# Patient Record
Sex: MALE | Race: WHITE | Employment: OTHER | URBAN - METROPOLITAN AREA
[De-identification: names, ages, dates, MRNs, and addresses within clinical notes are randomized per-mention and may not be internally consistent; named-entity substitution may affect disease eponyms.]

---

## 2017-01-12 DIAGNOSIS — I12.9 BENIGN HYPERTENSION WITH CHRONIC KIDNEY DISEASE, STAGE III (H): ICD-10-CM

## 2017-01-12 DIAGNOSIS — N18.30 BENIGN HYPERTENSION WITH CHRONIC KIDNEY DISEASE, STAGE III (H): ICD-10-CM

## 2017-01-12 NOTE — TELEPHONE ENCOUNTER
amLODIPine (NORVASC) 10 MG tablet      Last Written Prescription Date: 10/17/16  Last Fill Quantity: 90, # refills: 0    Last Office Visit with FMKEISHA, RADHAMES or [facility] prescribing provider:  10/13/16   Future Office Visit:    Next 5 appointments (look out 90 days)     Feb 14, 2017  2:30 PM   Return Visit with Catia Loredo MD   [facility]    [address] [place] [id number] [phone]                    BP Readings from Last 3 Encounters:   10/17/16 142/65   05/03/16 142/66   04/12/16 135/70           Micky Faarax  [facility]

## 2017-01-13 NOTE — TELEPHONE ENCOUNTER
Routing refill request to provider for review/approval because:  Last 2 BP readings were above goal.    Mally Russo RN

## 2017-01-14 RX ORDER — AMLODIPINE BESYLATE 10 MG/1
TABLET ORAL
Qty: 90 TABLET | Refills: 0 | Status: SHIPPED | OUTPATIENT
Start: 2017-01-14 | End: 2017-01-27

## 2017-01-27 ENCOUNTER — OFFICE VISIT (OUTPATIENT)
Dept: FAMILY MEDICINE | Facility: CLINIC | Age: 82
End: 2017-01-27
Payer: COMMERCIAL

## 2017-01-27 VITALS
SYSTOLIC BLOOD PRESSURE: 122 MMHG | OXYGEN SATURATION: 100 % | HEIGHT: 69 IN | TEMPERATURE: 98.8 F | WEIGHT: 215.4 LBS | BODY MASS INDEX: 31.9 KG/M2 | HEART RATE: 59 BPM | DIASTOLIC BLOOD PRESSURE: 64 MMHG

## 2017-01-27 DIAGNOSIS — K21.9 GASTROESOPHAGEAL REFLUX DISEASE WITHOUT ESOPHAGITIS: ICD-10-CM

## 2017-01-27 DIAGNOSIS — E78.5 HYPERLIPIDEMIA LDL GOAL <130: ICD-10-CM

## 2017-01-27 DIAGNOSIS — I12.9 BENIGN HYPERTENSION WITH CHRONIC KIDNEY DISEASE, STAGE III (H): ICD-10-CM

## 2017-01-27 DIAGNOSIS — J30.89 PERENNIAL ALLERGIC RHINITIS, UNSPECIFIED ALLERGIC RHINITIS TRIGGER: ICD-10-CM

## 2017-01-27 DIAGNOSIS — Z00.00 ENCOUNTER FOR ROUTINE ADULT HEALTH EXAMINATION WITHOUT ABNORMAL FINDINGS: Primary | ICD-10-CM

## 2017-01-27 DIAGNOSIS — E03.4 HYPOTHYROIDISM DUE TO ACQUIRED ATROPHY OF THYROID: ICD-10-CM

## 2017-01-27 DIAGNOSIS — N18.30 BENIGN HYPERTENSION WITH CHRONIC KIDNEY DISEASE, STAGE III (H): ICD-10-CM

## 2017-01-27 DIAGNOSIS — C61 PROSTATE CANCER (H): ICD-10-CM

## 2017-01-27 LAB
ALT SERPL W P-5'-P-CCNC: 20 U/L (ref 0–70)
ANION GAP SERPL CALCULATED.3IONS-SCNC: 8 MMOL/L (ref 3–14)
BUN SERPL-MCNC: 22 MG/DL (ref 7–30)
CALCIUM SERPL-MCNC: 9.4 MG/DL (ref 8.5–10.1)
CHLORIDE SERPL-SCNC: 107 MMOL/L (ref 94–109)
CHOLEST SERPL-MCNC: 156 MG/DL
CO2 SERPL-SCNC: 26 MMOL/L (ref 20–32)
CREAT SERPL-MCNC: 1.44 MG/DL (ref 0.66–1.25)
GFR SERPL CREATININE-BSD FRML MDRD: 46 ML/MIN/1.7M2
GLUCOSE SERPL-MCNC: 106 MG/DL (ref 70–99)
HDLC SERPL-MCNC: 46 MG/DL
LDLC SERPL CALC-MCNC: 80 MG/DL
NONHDLC SERPL-MCNC: 110 MG/DL
POTASSIUM SERPL-SCNC: 4.3 MMOL/L (ref 3.4–5.3)
PSA SERPL-MCNC: 0.17 UG/L (ref 0–4)
SODIUM SERPL-SCNC: 141 MMOL/L (ref 133–144)
T4 FREE SERPL-MCNC: 1.18 NG/DL (ref 0.76–1.46)
TRIGL SERPL-MCNC: 151 MG/DL
TSH SERPL DL<=0.005 MIU/L-ACNC: 4.34 MU/L (ref 0.4–4)
URATE SERPL-MCNC: 6.6 MG/DL (ref 3.5–7.2)

## 2017-01-27 PROCEDURE — 84460 ALANINE AMINO (ALT) (SGPT): CPT | Performed by: FAMILY MEDICINE

## 2017-01-27 PROCEDURE — 84443 ASSAY THYROID STIM HORMONE: CPT | Performed by: FAMILY MEDICINE

## 2017-01-27 PROCEDURE — 80048 BASIC METABOLIC PNL TOTAL CA: CPT | Performed by: FAMILY MEDICINE

## 2017-01-27 PROCEDURE — 84550 ASSAY OF BLOOD/URIC ACID: CPT | Performed by: FAMILY MEDICINE

## 2017-01-27 PROCEDURE — 36415 COLL VENOUS BLD VENIPUNCTURE: CPT | Performed by: FAMILY MEDICINE

## 2017-01-27 PROCEDURE — 84153 ASSAY OF PSA TOTAL: CPT | Performed by: FAMILY MEDICINE

## 2017-01-27 PROCEDURE — 80061 LIPID PANEL: CPT | Performed by: FAMILY MEDICINE

## 2017-01-27 PROCEDURE — G0439 PPPS, SUBSEQ VISIT: HCPCS | Performed by: FAMILY MEDICINE

## 2017-01-27 PROCEDURE — 84439 ASSAY OF FREE THYROXINE: CPT | Performed by: FAMILY MEDICINE

## 2017-01-27 RX ORDER — FLUTICASONE PROPIONATE 50 MCG
SPRAY, SUSPENSION (ML) NASAL
Qty: 1 BOTTLE | Refills: 1 | Status: SHIPPED | OUTPATIENT
Start: 2017-01-27 | End: 2018-02-21

## 2017-01-27 RX ORDER — LEVOTHYROXINE SODIUM 88 UG/1
TABLET ORAL
Qty: 90 TABLET | Refills: 1 | Status: CANCELLED | OUTPATIENT
Start: 2017-01-27

## 2017-01-27 RX ORDER — LEVOTHYROXINE SODIUM 88 UG/1
88 TABLET ORAL DAILY
Qty: 90 TABLET | Refills: 1 | Status: SHIPPED | OUTPATIENT
Start: 2017-01-27 | End: 2017-09-01

## 2017-01-27 RX ORDER — AMLODIPINE BESYLATE 10 MG/1
10 TABLET ORAL DAILY
Qty: 90 TABLET | Refills: 1 | Status: SHIPPED | OUTPATIENT
Start: 2017-01-27 | End: 2017-02-16

## 2017-01-27 RX ORDER — LISINOPRIL 40 MG/1
40 TABLET ORAL DAILY
Qty: 90 TABLET | Refills: 1 | Status: SHIPPED | OUTPATIENT
Start: 2017-01-27 | End: 2017-09-01

## 2017-01-27 ASSESSMENT — PAIN SCALES - GENERAL: PAINLEVEL: NO PAIN (0)

## 2017-01-27 NOTE — MR AVS SNAPSHOT
After Visit Summary   1/27/2017    James Lama    MRN: [id number]           Patient Information     Date Of Birth          6/28/1927        Visit Information        Provider Department      1/27/2017 10:00 AM Juan Carlos Jansen MD [facility]        Today's Diagnoses     Encounter for routine adult health examination without abnormal findings    -  1     Prostate cancer (H)         Benign hypertension with chronic kidney disease, stage III         Hyperlipidemia LDL goal <130         Hypothyroidism due to acquired atrophy of thyroid         Gastroesophageal reflux disease without esophagitis         Perennial allergic rhinitis, unspecified allergic rhinitis trigger           Care Instructions      Preventive Health Recommendations:       Male Ages 65 and over    Yearly exam:             See your health care provider every year in order to  o   Review health changes.   o   Discuss preventive care.    o   Review your medicines if your doctor has prescribed any.    Talk with your health care provider about whether you should have a test to screen for prostate cancer (PSA).    Every 3 years, have a diabetes test (fasting glucose). If you are at risk for diabetes, you should have this test more often.    Every 5 years, have a cholesterol test. Have this test more often if you are at risk for high cholesterol or heart disease.     Every 10 years, have a colonoscopy. Or, have a yearly FIT test (stool test). These exams will check for colon cancer.    Talk to with your health care provider about screening for Abdominal Aortic Aneurysm if you have a family history of AAA or have a history of smoking.  Shots:     Get a flu shot each year.     Get a tetanus shot every 10 years.     Talk to your doctor about your pneumonia vaccines. There are now two you should receive - Pneumovax (PPSV 23) and Prevnar (PCV 13).    Talk to your doctor about a shingles vaccine.     Talk to your doctor about  the hepatitis B vaccine.  Nutrition:     Eat at least 5 servings of fruits and vegetables each day.     Eat whole-grain bread, whole-wheat pasta and brown rice instead of white grains and rice.     Talk to your doctor about Calcium and Vitamin D.   Lifestyle    Exercise for at least 150 minutes a week (30 minutes a day, 5 days a week). This will help you control your weight and prevent disease.     Limit alcohol to one drink per day.     No smoking.     Wear sunscreen to prevent skin cancer.     See your dentist every six months for an exam and cleaning.     See your eye doctor every 1 to 2 years to screen for conditions such as glaucoma, macular degeneration and cataracts.        Follow-ups after your visit        Follow-up notes from your care team     Return in about 6 months (around 7/14/2017) for blood pressure check, lab tests, recheck medications.      Your next 10 appointments already scheduled     Feb 14, 2017  2:30 PM   [facility]    [address] [place] [id number] [phone]              Who to contact     If you have questions or need follow up information about today's clinic visit or your schedule please contact [facility] directly at [phone].  Normal or non-critical lab and imaging results will be communicated to you by [employer], letter or phone within 4 business days after the clinic has received the results. If you do not hear from us within 7 days, please contact the clinic through [employer] or phone. If you have a critical or abnormal lab result, we will notify you by phone as soon as possible.  Submit refill requests through [employer]point or call your pharmacy and they will forward the refill request to us. Please allow 3 business days for your refill to be completed.          Additional Information About Your Visit        auctionpoint Information     auctionpoint lets you send messages  "to your doctor, view your test results, renew your prescriptions, schedule appointments and more. To sign up, go to [email or web address] . Click on \"Log in\" on the left side of the screen, which will take you to the Welcome page. Then click on \"Sign up Now\" on the right side of the page.     You will be asked to enter the access code listed below, as well as some personal information. Please follow the directions to create your username and password.     Your access code is: [email or web address]  Expires: 2017 10:40 AM     Your access code will  in 90 days. If you need help or a new code, please call your [facility] clinic or [phone].        Care EveryWhere ID     This is your [facility] EveryWhere ID. This could be used by other organizations to access your [facility] medical records  [id number]        Your Vitals Were     Pulse Temperature Height BMI (Body Mass Index) Pulse Oximetry       59 98.8  F (37.1  C) (Oral) 5' 8.75\" (1.746 m) 32.05 kg/m2 100%        Blood Pressure from Last 3 Encounters:   17 122/64   10/17/16 142/65   16 142/66    Weight from Last 3 Encounters:   17 215 lb 6.4 oz (97.705 kg)   10/17/16 215 lb (97.523 kg)   16 213 lb (96.616 kg)              We Performed the Following     ALT     Basic metabolic panel     Lipid panel reflex to direct LDL     PSA, tumor marker     TSH with free T4 reflex     Uric acid          Today's Medication Changes          These changes are accurate as of: 17 10:40 AM.  If you have any questions, ask your nurse or doctor.               These medicines have changed or have updated prescriptions.        Dose/Directions    amLODIPine 10 MG tablet   Commonly known as:  NORVASC   This may have changed:  See the new instructions.   Used for:  Benign hypertension with chronic kidney disease, stage III   Changed by:  Juan Carlos Jansen MD        Dose:  10 mg   Take 1 tablet (10 mg) by mouth daily for blood pressure.   Quantity:  90 tablet "   Refills:  1       fluticasone 50 MCG/ACT spray   Commonly known as:  FLONASE   This may have changed:  See the new instructions.   Used for:  Perennial allergic rhinitis, unspecified allergic rhinitis trigger   Changed by:  Juan Carlos Jansen MD        Use 1-2 sprays each nostril daily for allergy prevention.   Quantity:  1 Bottle   Refills:  1       levothyroxine 88 MCG tablet   Commonly known as:  SYNTHROID/LEVOTHROID   This may have changed:  See the new instructions.   Used for:  Hypothyroidism due to acquired atrophy of thyroid   Changed by:  Juan Carlos Jansen MD        Dose:  88 mcg   Take 1 tablet (88 mcg) by mouth daily for thyroid.   Quantity:  90 tablet   Refills:  1       omeprazole 20 MG CR capsule   Commonly known as:  priLOSEC   This may have changed:  additional instructions   Used for:  Gastroesophageal reflux disease without esophagitis   Changed by:  Juan Carlos Jansen MD        Dose:  40 mg   Take 2 capsules (40 mg) by mouth daily for reflux.   Quantity:  180 capsule   Refills:  3            Where to get your medicines      These medications were sent to [facility] [address] [place] [id number]     Phone:  [phone]    - amLODIPine 10 MG tablet  - fluticasone 50 MCG/ACT spray  - levothyroxine 88 MCG tablet  - lisinopril 40 MG tablet  - omeprazole 20 MG CR capsule             Primary Care Provider Office Phone # Fax #    Juan Carlos Jansen [phone]       [facility] [address] [facility] [address]        Thank you!     Thank you for choosing [facility]  for your care. Our goal is always to provide you with excellent care. Hearing back from our patients is one way we can continue to improve our services. Please take a few minutes to complete the written survey that you may receive in the mail after your visit with us. Thank you!             Your Updated Medication  List - Protect others around you: Learn how to safely use, store and throw away your medicines at www.disposemymeds.org.          This list is accurate as of: 1/27/17 10:40 AM.  Always use your most recent med list.                   Brand Name Dispense Instructions for use    amLODIPine 10 MG tablet    NORVASC    90 tablet    Take 1 tablet (10 mg) by mouth daily for blood pressure.       aspirin 81 MG tablet      1 tablet daily*       cyanocobalamin 1000 MCG Tabs      1 tablet daily. for foot neuropathy.       fluticasone 50 MCG/ACT spray    FLONASE    1 Bottle    Use 1-2 sprays each nostril daily for allergy prevention.       indomethacin 50 MG capsule    INDOCIN    40 capsule    Take 1 capsule (50 mg) by mouth 3 times daily (with meals) as needed for gouty attack.       levothyroxine 88 MCG tablet    SYNTHROID/LEVOTHROID    90 tablet    Take 1 tablet (88 mcg) by mouth daily for thyroid.       lisinopril 40 MG tablet    PRINIVIL/ZESTRIL    90 tablet    Take 1 tablet (40 mg) by mouth daily for blood pressure.       lovastatin 20 MG tablet    MEVACOR    90 tablet    TAKE ONE TABLET BY MOUTH IN THE EVENING FOR CHOLESTEROL       omeprazole 20 MG CR capsule    priLOSEC    180 capsule    Take 2 capsules (40 mg) by mouth daily for reflux.       TYLENOL 500 MG tablet   Generic drug:  acetaminophen      Take 2 tablets by mouth 2 times daily as needed for pain (knee).       VITAMIN D (CHOLECALCIFEROL) PO      Take  by mouth daily.

## 2017-01-27 NOTE — PROGRESS NOTES
SUBJECTIVE:                                                            James Lama is a 89 year old male who presents for Preventive Visit.  He is accompanied by his wife.     Are you in the first 12 months of your Medicare Part B coverage?  No    Healthy Habits:    Do you get at least three servings of calcium containing foods daily (dairy, green leafy vegetables, etc.)? yes    Amount of exercise or daily activities, outside of work: 7 day(s) per week    Problems taking medications regularly: No    Medication side effects: No    Have you had an eye exam in the past two years? yes    Do you see a dentist twice per year? yes    Do you have sleep apnea, excessive snoring or daytime drowsiness? no    COGNITIVE SCREEN  1) Repeat 3 items (Banana, Sunrise, Chair)    2) Clock draw: NORMAL  3) 3 item recall: Recalls 2 objects   Results: NORMAL clock, 1-2 items recalled: COGNITIVE IMPAIRMENT LESS LIKELY    Mini-CogTM Copyright S Ted. Licensed by the author for use in [facility]; reprinted with permission ([email or web address]). All rights reserved.        Past medical, family, and social histories, medications, and allergies are reviewed and updated in Epic.    Social History   Substance Use Topics     Smoking status: Never Smoker      Smokeless tobacco: Never Used     Alcohol Use: No       The patient does not drink >3 drinks per day nor >7 drinks per week.    Today's PHQ-2 Score:   PHQ-2 ( 1999 Pfizer) 1/27/2017 5/3/2016   Q1: Little interest or pleasure in doing things 0 0   Q2: Feeling down, depressed or hopeless 0 0   PHQ-2 Score 0 0     Do you feel safe in your environment - No    Do you have a Health Care Directive?: No: Advance care planning reviewed with patient; information given to patient to review.    Current providers sharing in care for this patient include:   Patient Care Team:  Juan Carlos Jansen MD as PCP - General      Hearing impairment: Yes, has hearing aides    Ability to successfully  "perform activities of daily living: Yes, no assistance needed     Fall risk:  Fallen 2 or more times in the past year?: No  Any fall with injury in the past year?: No    Home safety:  none identified    The following health maintenance items are reviewed in Epic and correct as of today:  Health Maintenance   Topic Date Due     MARJ QUESTIONNAIRE 1 YEAR  06/29/1927     PHQ-9 Q1YR (NO INBASKET)  06/29/1927     EYE EXAM Q1 YEAR( NO INBASKET)  08/05/2016     ADVANCE DIRECTIVE PLANNING Q5 YRS (NO INBASKET)  11/10/2016     FALL RISK ASSESSMENT  04/12/2017     TSH Q1 YEAR (NO INBASKET)  05/03/2017     INFLUENZA VACCINE (SYSTEM ASSIGNED)  09/01/2017     LIPID MONITORING Q2 YEARS (NO INBASKET)  05/03/2018     TETANUS IMMUNIZATION (SYSTEM ASSIGNED)  11/02/2022     PNEUMOCOCCAL  Completed        ROS:  Constitutional, HEENT, cardiovascular, pulmonary, gi and gu systems are negative, except as otherwise noted.      Any history above obtained by the Medical Assistant was reviewed by Dr. Juan Carlos Jansen MD, and edited when necessary.    This document serves as a record of the services and decisions personally performed and made by Dr. Jansen. It was created on his behalf by Kenia So, a trained medical scribe. The creation of this document is based on the provider's statements to the medical scribe.  Kenia So January 27, 2017 10:14 AM   OBJECTIVE:                                                            /64 mmHg  Pulse 59  Temp(Src) 98.8  F (37.1  C) (Oral)  Ht 5' 8.75\" (1.746 m)  Wt 215 lb 6.4 oz (97.705 kg)  BMI 32.05 kg/m2  SpO2 100% Estimated body mass index is 32.05 kg/(m^2) as calculated from the following:    Height as of this encounter: 5' 8.75\" (1.746 m).    Weight as of this encounter: 215 lb 6.4 oz (97.705 kg).  EXAM:   GENERAL: healthy, alert and no distress  EYES: Eyes grossly normal to inspection, PERRL and conjunctivae and sclerae normal  HENT: ear canals and TM's normal, nose and mouth " without ulcers or lesions  NECK: no adenopathy, no asymmetry, masses, or scars and thyroid normal to palpation  RESP: lungs clear to auscultation - no rales, rhonchi or wheezes  CV: regular rate and rhythm, normal S1 S2, no S3 or S4, no murmur, click or rub, no peripheral edema and peripheral pulses strong  ABDOMEN: soft, nontender, no hepatosplenomegaly, no masses and bowel sounds normal   (male): normal male genitalia without lesions or urethral discharge, no hernia  MS: no gross musculoskeletal defects noted, no edema  SKIN: no suspicious lesions or rashes  NEURO: Normal strength and tone, mentation intact and speech normal, DTR's symmetrical, cranial nerves 2-12 intact   PSYCH: mentation appears normal, affect normal/bright    ASSESSMENT / PLAN:                                                            (Z00.00) Encounter for routine adult health examination without abnormal findings  (primary encounter diagnosis)  Comment: Negative screening exam; up-to-date on preventive services.   Plan: Follow up in 1 year for EDWINA    (I12.9,  N18.3) Benign hypertension with chronic kidney disease, stage III  Comment: well controlled  Plan: amLODIPine (NORVASC) 10 MG tablet, lisinopril         (PRINIVIL/ZESTRIL) 40 MG tablet, Basic         metabolic panel, Uric acid        Follow up in 6 months     (C61) Prostate cancer (H)  Comment: periodic monitoring.   Plan: PSA, tumor marker            (E78.5) Hyperlipidemia LDL goal <130  Comment: historically at goal  Plan: Lipid panel reflex to direct LDL, ALT        Follow up in 6 months     (E03.4) Hypothyroidism due to acquired atrophy of thyroid  Comment: historically euthyroid   Plan: levothyroxine (SYNTHROID/LEVOTHROID) 88 MCG         tablet, TSH with free T4 reflex        Follow up in 6 months     (K21.9) Gastroesophageal reflux disease without esophagitis  Comment: prescription update   Plan: omeprazole (PRILOSEC) 20 MG CR capsule        Follow up in 6 months     (J30.89)  "Perennial allergic rhinitis, unspecified allergic rhinitis trigger  Comment: prescription update   Plan: fluticasone (FLONASE) 50 MCG/ACT spray            End of Life Planning:  Patient currently has an advanced directive: No.  I have verified the patient's ability to prepare an advanced directive/make health care decisions.  Literature was provided to assist patient in preparing an advanced directive.    COUNSELING:  Reviewed preventive health counseling, as reflected in patient instructions      Estimated body mass index is 32.05 kg/(m^2) as calculated from the following:    Height as of this encounter: 5' 8.75\" (1.746 m).    Weight as of this encounter: 215 lb 6.4 oz (97.705 kg).  Weight management plan: Discussed healthy diet and exercise guidelines and patient will follow up in 12 months in clinic to re-evaluate.      reports that he has never smoked. He has never used smokeless tobacco.      Appropriate preventive services were discussed with this patient, including applicable screening as appropriate for cardiovascular disease, diabetes, osteopenia/osteoporosis, and glaucoma.  As appropriate for age/gender, discussed screening for colorectal cancer, prostate cancer, breast cancer, and cervical cancer. Checklist reviewing preventive services available has been given to the patient.    Reviewed patients plan of care and provided an AVS. The Basic Care Plan (routine screening as documented in Health Maintenance) for James meets the Care Plan requirement. This Care Plan has been established and reviewed with the Patient and wife.      Counseling Resources:  ATP IV Guidelines  Pooled Cohorts Equation Calculator  Breast Cancer Risk Calculator  FRAX Risk Assessment  ICSI Preventive Guidelines  Dietary Guidelines for Americans, 2010  USDA's MyPlate  ASA Prophylaxis  Lung CA Screening      The information in this document, created by the medical scribe for me, accurately reflects the services I personally performed " and the decisions made by me. I have reviewed and approved this document for accuracy prior to leaving the patient care area.  Juan Carlos Jansen MD

## 2017-02-14 ENCOUNTER — OFFICE VISIT (OUTPATIENT)
Dept: DERMATOLOGY | Facility: CLINIC | Age: 82
End: 2017-02-14
Payer: COMMERCIAL

## 2017-02-14 DIAGNOSIS — Z85.828 HISTORY OF NONMELANOMA SKIN CANCER: Primary | ICD-10-CM

## 2017-02-14 DIAGNOSIS — H02.9 LESION OF UPPER EYELID: ICD-10-CM

## 2017-02-14 DIAGNOSIS — D48.5 NEOPLASM OF UNCERTAIN BEHAVIOR OF SKIN: ICD-10-CM

## 2017-02-14 PROCEDURE — 11100 HC BIOPSY SKIN/SUBQ/MUC MEM, SINGLE LESION: CPT | Performed by: DERMATOLOGY

## 2017-02-14 PROCEDURE — 88305 TISSUE EXAM BY PATHOLOGIST: CPT | Performed by: DERMATOLOGY

## 2017-02-14 PROCEDURE — 88342 IMHCHEM/IMCYTCHM 1ST ANTB: CPT | Performed by: DERMATOLOGY

## 2017-02-14 PROCEDURE — 99213 OFFICE O/P EST LOW 20 MIN: CPT | Mod: 25 | Performed by: DERMATOLOGY

## 2017-02-14 PROCEDURE — 11101 HC BIOPSY SKIN/SUBQ/MUC MEM, EACH ADDTL LESION: CPT | Performed by: DERMATOLOGY

## 2017-02-14 NOTE — PATIENT INSTRUCTIONS

## 2017-02-14 NOTE — PROGRESS NOTES
[facility] Dermatology Note      Dermatology Problem List:  1. 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 Actinic keratosis  -s/p cryotherapy     Encounter Date: Feb 14, 2017    CC:  Chief Complaint   Patient presents with     RECHECK     1 year skin check - hx of BCC         History of Present Illness:  Mr. James Lama is a 89 year old male who presents as a follow up for history of BCC. The patient was last seen 3/3/2016 by Dr. Murcia when a BCC on the left postauricular was treated with Mohs. Today, the patient reports that the bump on his left eyelid are new, he has not brought it up to his eye doctor. The patient reports no other lesions of concern.     The patient is present with his wife today.     Past Medical History:   Patient Active Problem List   Diagnosis     Prostate cancer (H)     Hyperlipidemia LDL goal <130     Benign hypertension with chronic kidney disease, stage III     KESHA (obstructive sleep apnea)     Gout     Diverticulosis     Perennial allergic rhinitis     GERD (gastroesophageal reflux disease)     Hypothyroidism due to acquired atrophy of thyroid     Erectile dysfunction     Hypertension goal BP (blood pressure) < 140/90     Right inguinal hernia     History of basal cell carcinoma     Advance care planning     Paresthesias/numbness, both feet     Vitamin B12 deficiency (non anemic)     Health Care Home     Actinic keratosis     Obesity, Class I, BMI 30-34.9     Recurrent falls     Cortical cataract of both eyes     Nuclear sclerosis of both eyes     Chronic kidney disease, stage 3 (moderate)     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 Mcmanus     Recurrent BCC (basal cell carcinoma)      Past Surgical History   Procedure Laterality Date     Hernia repair, inguinal rt/lt  ~1988     LT     Exc skin malig 1.1-2cm trunk,arm,leg  8/06     BCC upper back     Exc skin malig 0.6-1cm face,facial  5/09     BCC left ala     Exc skin malig 0.5cm or less,facial  5/09     BCC nasal tip     Exc skin malig 1.1-2cm face,facial  10/09     BCC forehead     Social History:  Not obtained at visit.     Family History:  Not obtained at visit.     Medications:  Current Outpatient Prescriptions   Medication Sig Dispense Refill     amLODIPine (NORVASC) 10 MG tablet Take 1 tablet (10 mg) by mouth daily for blood pressure. 90 tablet 1     fluticasone (FLONASE) 50 MCG/ACT spray Use 1-2 sprays each nostril daily for allergy prevention. 1 Bottle 1     lisinopril (PRINIVIL/ZESTRIL) 40 MG tablet Take 1 tablet (40 mg) by mouth daily for blood pressure. 90 tablet 1     omeprazole (PRILOSEC) 20 MG CR capsule Take 2 capsules (40 mg) by mouth daily for reflux. 180 capsule 3     levothyroxine (SYNTHROID/LEVOTHROID) 88 MCG tablet Take 1 tablet (88 mcg) by mouth daily for thyroid. 90 tablet 1     lovastatin (MEVACOR) 20 MG tablet TAKE ONE TABLET BY MOUTH IN THE EVENING FOR CHOLESTEROL 90 tablet 1     indomethacin (INDOCIN) 50 MG capsule Take 1 capsule (50 mg) by mouth 3 times daily (with meals) as needed for gouty attack. 40 capsule 1     acetaminophen (TYLENOL) 500 MG tablet Take 2 tablets by mouth 2 times daily as needed for pain (knee).  0     VITAMIN D, CHOLECALCIFEROL, PO Take  by mouth daily.       Cyanocobalamin 1000 MCG TABS 1 tablet daily. for foot neuropathy.       ASPIRIN 81 MG OR TABS 1 tablet daily*         Allergies   Allergen  Reactions     Acrylate Copolymer Rash     topical     Review of Systems:  -Skin: As above in HPI. No additional skin concerns.     Physical exam:  Vitals: There were no vitals taken for this visit.  GEN: This is a well developed, well-nourished male in no acute distress, in a pleasant mood.    SKIN: Full skin, which includes the head/face, both arms, chest, back, abdomen,both legs, genitalia and/or groin buttocks, digits and/or nails, was examined.  -There are well healed surgical scars without erythema, nodularity or telangiectasias on the left preauricular, nasal tip and upper back.   -Swelling of the dorsal feet.   -Papule on the left upper eyelid.   -Light brown well demarcated 3mm papule on the left lower back.   -Brown 3mm irregularly shaped macule on the right sideburn.   -No other lesions of concern on areas examined.     Impression/Plan:  1. History of nonmelanoma skin cancer, no clincial evidence of recurrence:  2. History of actinic keratosis, no clinical evidence of recurrence.  3. Lesion, left upper eyelid.     Referral placed for Dr. Maulik Winslow possible biopsy  4. Brown 3mm macule, right sideburn. Neoplasm of uncertain behavior. Differential diagnosis includes lentigo versus other    Shave biopsy:  After discussion of benefits and risks including but not limited to bleeding/bruising, pain/swelling, infection, scar, incomplete removal, nerve damage/numbness, recurrence, and non-diagnostic biopsy, written consent, verbal consent and photographs were obtained. Time-out was performed. The area was cleaned with isopropyl alcohol. 0.5 mL of 1% lidocaine with epinephrine was injected to obtain adequate anesthesia of the lesion on the right sideburn. A shave biopsy was performed. Hemostasis was achieved with aluminium chloride. Vaseline and a sterile dressing were applied. The patient tolerated the procedure and no complications were noted. The patient was provided with verbal and written post care  instructions.   5. Light brown well demarcated 3mm papule, left lower back. Neoplasm of uncertain behavior. Differential diagnosis includes dysplastic nevus versus nevus.    Shave biopsy:  After discussion of benefits and risks including but not limited to bleeding/bruising, pain/swelling, infection, scar, incomplete removal, nerve damage/numbness, recurrence, and non-diagnostic biopsy, written consent, verbal consent and photographs were obtained. Time-out was performed. The area was cleaned with isopropyl alcohol. 0.5 mL of 1% lidocaine with epinephrine was injected to obtain adequate anesthesia of the lesion on the left lower back. A shave biopsy was performed. Hemostasis was achieved with aluminium chloride. Vaseline and a sterile dressing were applied. The patient tolerated the procedure and no complications were noted. The patient was provided with verbal and written post care instructions.   6. Edema, dorsal feet    Recommend follow up with PCP.     Follow up in 1 year, earlier for new or changing lesions.     Staff Involved:  Scribe/Staff    Scribe Disclosure:   I, Radha Loredo, am serving as a scribe to document services personally performed by Dr. Catia Loredo, based on data collection and the provider's statements to me.     Provider Disclosure:   I agree with above History, Review of Systems, Physical exam and Plan. I have reviewed the content of the documentation and have edited it as needed. I have personally performed the services documented here and the documentation accurately represents those services and the decisions I have made.     Catia Loredo MD    Department of Dermatology  [facility]: Phone: [phone], Fax:[phone]  [facility]: Phone: [phone], Fax: [phone]

## 2017-02-14 NOTE — NURSING NOTE
Dermatology Rooming Note    James Lama's goals for this visit include:   Chief Complaint   Patient presents with     RECHECK     1 year skin check - hx of BCC       Is a scribe okay for this visit: YES    Are records needed for this visit(If yes, obtain release of information): NO     Vitals: There were no vitals taken for this visit.    Referring Provider:  ESTABLISHED PATIENT  No address on file    Coni Aleman CMA

## 2017-02-14 NOTE — MR AVS SNAPSHOT
After Visit Summary   2/14/2017    James Lama    MRN: [id number]           Patient Information     Date Of Birth          6/28/1927        Visit Information        Provider Department      2/14/2017 2:30 PM Catia Loredo MD [facility]        Today's Diagnoses     History of nonmelanoma skin cancer    -  1    Neoplasm of uncertain behavior of skin        Lesion of upper eyelid          Care Instructions    Wound Care After a Biopsy    What is a skin biopsy?  A skin biopsy allows the doctor to examine a very small piece of tissue under the microscope to determine the diagnosis and the best treatment for the skin condition. A local anesthetic (numbing medicine)  is injected with a very small needle into the skin area to be tested. A small piece of skin is taken from the area. Sometimes a suture (stitch) is used.     What are the risks of a skin biopsy?  I will experience scar, bleeding, swelling, pain, crusting and redness. I may experience incomplete removal or recurrence. Risks of this procedure are excessive bleeding, bruising, infection, nerve damage, numbness, thick (hypertrophic or keloidal) scar and non-diagnostic biopsy.    How should I care for my wound for the first 24 hours?    Keep the wound dry and covered for 24 hours    If it bleeds, hold direct pressure on the area for 15 minutes. If bleeding does not stop then go to the emergency room    Avoid strenuous exercise the first 1-2 days or as your doctor instructs you    How should I care for the wound after 24 hours?    After 24 hours, remove the bandage    You may bathe or shower as normal    If you had a scalp biopsy, you can shampoo as usual and can use shower water to clean the biopsy site daily    Clean the wound twice a day with gentle soap and water    Do not scrub, be gentle    Apply white petroleum/Vaseline after cleaning the wound with a cotton swab or a clean finger, and keep the site covered with a  Bandaid /bandage. Bandages are not necessary with a scalp biopsy    If you are unable to cover the site with a Bandaid /bandage, re-apply ointment 2-3 times a day to keep the site moist. Moisture will help with healing    Avoid strenuous activity for first 1-2 days    Avoid lakes, rivers, pools, and oceans until the stitches are removed or the site is healed    How do I clean my wound?    Wash hands for 15 minutes with soap or use hand  before all wound care    Clean the wound with gentle soap and water    Apply white petroleum/Vaseline  to wound after it is clean    Replace the Bandaid /bandage to keep the wound covered for the first few days or as instructed by your doctor    If you had a scalp biopsy, warm shower water to the area on a daily basis should suffice    What should I use to clean my wound?     Cotton-tipped applicators (Qtips )    White petroleum jelly (Vaseline ). Use a clean new container and use Q-tips to apply.    Bandaids   as needed    Gentle soap     How should I care for my wound long term?    Do not get your wound dirty    Keep up with wound care for one week or until the area is healed.    A small scab will form and fall off by itself when the area is completely healed. The area will be red and will become pink in color as it heals. Sun protection is very important for how your scar will turn out. Sunscreen with an SPF 30 or greater is recommended once the area is healed.    You should have some soreness but it should be mild and slowly go away over several days. Talk to your doctor about using tylenol for pain,    When should I call my doctor?  If you have increased:     Pain or swelling    Pus or drainage (clear or slightly yellow drainage is ok)    Temperature over 100F    Spreading redness or warmth around wound    When will I hear about my results?  The biopsy results can take 2-3 weeks to come back. The clinic will call you with the results, send you a [employer] message, or have  you schedule a follow-up clinic or phone time to discuss the results. Contact our clinics if you do not hear from us in 3 weeks.     Who should I call with questions?    [facility]: [phone]     [facility]: [phone]    For urgent needs outside of business hours call the [facility] at [phone] and ask for the dermatology resident on call            Follow-ups after your visit        Who to contact     If you have questions or need follow up information about today's clinic visit or your schedule please contact [facility] [[email or web address]] directly at [phone].  Normal or non-critical lab and imaging results will be communicated to you by MyChart, letter or phone within 4 business days after the clinic has received the results. If you do not hear from us within 7 days, please contact the clinic through MyChart or phone. If you have a critical or abnormal lab result, we will notify you by phone as soon as possible.  Submit refill requests through [employer] or call your pharmacy and they will forward the refill request to us. Please allow 3 business days for your refill to be completed.          Additional Information About Your Visit        Care EveryWhere ID     This is your Care EveryWhere ID. This could be used by other organizations to access your [place] medical records  [id number]         Blood Pressure from Last 3 Encounters:   01/27/17 122/64   10/17/16 142/65   05/03/16 142/66    Weight from Last 3 Encounters:   01/27/17 97.7 kg (215 lb 6.4 oz)   10/17/16 97.5 kg (215 lb)   05/03/16 96.6 kg (213 lb)              We Performed the Following     BIOPSY SKIN/SUBQ/MUC MEM, EACH ADDTL LESION     BIOPSY SKIN/SUBQ/MUC MEM, SINGLE LESION     Surgical pathology exam        Primary Care Provider Office Phone # Fax #    Juan Carlos Jansen [phone]       [facility] [address]  [facility]  [address]        Thank you!     Thank you for choosing [facility]  for your care. Our goal is always to provide you with excellent care. Hearing back from our patients is one way we can continue to improve our services. Please take a few minutes to complete the written survey that you may receive in the mail after your visit with us. Thank you!             Your Updated Medication List - Protect others around you: Learn how to safely use, store and throw away your medicines at www.disposemymeds.org.          This list is accurate as of: 2/14/17  3:35 PM.  Always use your most recent med list.                   Brand Name Dispense Instructions for use    amLODIPine 10 MG tablet    NORVASC    90 tablet    Take 1 tablet (10 mg) by mouth daily for blood pressure.       aspirin 81 MG tablet      1 tablet daily*       cyanocobalamin 1000 MCG Tabs      1 tablet daily. for foot neuropathy.       fluticasone 50 MCG/ACT spray    FLONASE    1 Bottle    Use 1-2 sprays each nostril daily for allergy prevention.       indomethacin 50 MG capsule    INDOCIN    40 capsule    Take 1 capsule (50 mg) by mouth 3 times daily (with meals) as needed for gouty attack.       levothyroxine 88 MCG tablet    SYNTHROID/LEVOTHROID    90 tablet    Take 1 tablet (88 mcg) by mouth daily for thyroid.       lisinopril 40 MG tablet    PRINIVIL/ZESTRIL    90 tablet    Take 1 tablet (40 mg) by mouth daily for blood pressure.       lovastatin 20 MG tablet    MEVACOR    90 tablet    TAKE ONE TABLET BY MOUTH IN THE EVENING FOR CHOLESTEROL       omeprazole 20 MG CR capsule    priLOSEC    180 capsule    Take 2 capsules (40 mg) by mouth daily for reflux.       TYLENOL 500 MG tablet   Generic drug:  acetaminophen      Take 2 tablets by mouth 2 times daily as needed for pain (knee).       VITAMIN D (CHOLECALCIFEROL) PO      Take  by mouth daily.

## 2017-02-15 ENCOUNTER — TELEPHONE (OUTPATIENT)
Dept: FAMILY MEDICINE | Facility: CLINIC | Age: 82
End: 2017-02-15

## 2017-02-15 NOTE — TELEPHONE ENCOUNTER
Reason for Call:  Other     Detailed comments: questions about neuropathy and water retention in his legs    Phone Number Patient can be reached at: Cell number on file:    Telephone Information:   Mobile [phone]       Best Time: any    Can we leave a detailed message on this number? YES    Call taken on 2/15/2017 at 4:40 PM by Catina Wilson

## 2017-02-15 NOTE — TELEPHONE ENCOUNTER
Returned call to patient -   Patient states he saw his dermatologist yesterday and was told to get his swollen feet / ankles checked out by his doctor.  Patient states he has had swelling in his feet and ankles for about a year, gradually worsening. He thought it had to do with his neuropathy but was told by derm that they thought it may be a vascular problem.    His legs are not painful. Swelling to just above ankles. Usually elevates in recliner chair at night but doesn't help. Does not wear any compression stockings.  Denies any SOB.  He wonders if Dr. Jansen noted this in his recent exam on 1/27/17 (OV note states no edema in ROS)    Advice: To be seen in clinic for evaluation, set up appt for tomorrow.    Next 5 appointments (look out 90 days)     Feb 16, 2017  6:20 PM CST   Office Visit with Juan Carlos Jansen MD   [facility]    [address] [place] [id number] [phone]                Advised If further questions/concerns or if symptoms worsen or new symptoms develop, call back clinic as soon as possible. After hours, a 24 hr nurse line is available by calling main clinic phone # and following prompt to speak with a [place] Nurse Advisor.    Rylan Silva RN

## 2017-02-16 ENCOUNTER — OFFICE VISIT (OUTPATIENT)
Dept: FAMILY MEDICINE | Facility: CLINIC | Age: 82
End: 2017-02-16
Payer: COMMERCIAL

## 2017-02-16 VITALS
HEART RATE: 69 BPM | DIASTOLIC BLOOD PRESSURE: 69 MMHG | HEIGHT: 69 IN | BODY MASS INDEX: 32.14 KG/M2 | SYSTOLIC BLOOD PRESSURE: 144 MMHG | TEMPERATURE: 96.2 F | WEIGHT: 217 LBS

## 2017-02-16 DIAGNOSIS — I12.9 BENIGN HYPERTENSION WITH CHRONIC KIDNEY DISEASE, STAGE III (H): ICD-10-CM

## 2017-02-16 DIAGNOSIS — N18.30 BENIGN HYPERTENSION WITH CHRONIC KIDNEY DISEASE, STAGE III (H): ICD-10-CM

## 2017-02-16 DIAGNOSIS — R60.0 BILATERAL EDEMA OF LOWER EXTREMITY: Primary | ICD-10-CM

## 2017-02-16 PROCEDURE — 99214 OFFICE O/P EST MOD 30 MIN: CPT | Performed by: FAMILY MEDICINE

## 2017-02-16 RX ORDER — METOPROLOL SUCCINATE 25 MG/1
25 TABLET, EXTENDED RELEASE ORAL DAILY
Qty: 90 TABLET | Refills: 1 | Status: SHIPPED | OUTPATIENT
Start: 2017-02-16 | End: 2017-08-30

## 2017-02-16 RX ORDER — AMLODIPINE BESYLATE 10 MG/1
5 TABLET ORAL DAILY
COMMUNITY
Start: 2017-02-16 | End: 2017-03-02

## 2017-02-16 NOTE — MR AVS SNAPSHOT
After Visit Summary   2/16/2017    aJmes Lama    MRN: [id number]           Patient Information     Date Of Birth          6/28/1927        Visit Information        Provider Department      2/16/2017 6:20 PM Juan Carlos Jansen MD [facility]        Today's Diagnoses     Bilateral edema of lower extremity    -  1    Benign hypertension with chronic kidney disease, stage III          Care Instructions    This summary includes the important diagnoses, test, medications and other important parts of your medical history.  Below are a few good we sites you can use to learn more about these.     Www.Ozmota.org : Up to date and easily searchable information on multiple topics.  Www.[facility].CardioVIP/Pharmacy/c_[email or web address].asp : [facility] $4.99 medications  Www.Alekto.gov : medication info, interactive tutorials, watch real surgeries online  Www.familydoctor.org : good info from the Academy of Family Physicians  Www.Ecast.Quandoo : good info from the [facility]  Www.cdc.gov : public health info, travel advisories, epidemics (H1N1)  Www.aap.org : children's health info, normal development, vaccinations  Www.health.state.[place].us : [place] dept of heatlh, public health issues in [place], N1N1    Based on your medical history and these are the current health maintenance or preventive care services that you are due for (some may have been done at this visit:)  Health Maintenance Due   Topic Date Due     MARJ QUESTIONNAIRE 1 YEAR  06/29/1927     PHQ-9 Q1YR (NO INBASKET)  06/29/1927     EYE EXAM Q1 YEAR( NO INBASKET)  08/05/2016     =================================================================================  Normal Values   Blood pressure  <140/90 for most adults    <130/80 for some chronic diseases (ask your care team about yours)    BMI (body mass index)  18.5-25 kg/m2 (based on height and weight)     Thank you for visiting [facility] [place]    Normal or non-critical lab and  imaging results will be communicated to you by MyChart, letter or phone within 7 days.  If you do not hear from us within 10 days, please call the clinic. If you have a critical or abnormal lab result, we will notify you by phone as soon as possible.     If you have any questions regarding your visit please contact:     Team Comfort:   Clinic Hours Telephone Number   Dr. Juan Carlos Ceballos   7am-5pm  Monday - Friday [phone]  Rylan KUMAR   Pharmacy 8am-8pm Monday-Thursday      8am-6pm Friday  9am-5pm Saturday-Sunday [phone]   Urgent Care 11am-8pm Monday-Friday        9am-5pm Saturday-Sunday [phone]     After hours, weekend or if you need to make an appointment with your primary provider please call [phone].   After Hours nurse advise: call [facility]: [phone]    Medication Refills:  Call your pharmacy and they will forward the refill to us. Please allow 3 business days for your refills to be completed.    Use [employer] (secure email communication and access to your chart) to send your primary care provider a message or make an appointment. Ask someone on your Team how to sign up for [employer]. To log on to eMotion Technologies or for more information in Privepass please visit the website at [email or web address] the [email or web address].  As of October 8, 2013, all password changes, disabled accounts, or ID changes in [employer]/MyHealth will be done by our Access Services Department.   If you need help with your account or password, call: [phone]. Clinic staff no longer has the ability to change passwords.                 Please call your clinic of choice to schedule your echocardiogram (heart ultrasound):    [facility]:   [phone]  [facility]:  [phone]  [facility]:  [phone]  [facility]): [phone] (Friday)  [facility]:   [phone]  [facility]:  [phone]  [place]  [facility] [phone]  [facility] [phone]          Follow-ups after your visit        Follow-up notes from your care team     Return in about 2 weeks (around 3/2/2017) for blood pressure check.      Future tests that were ordered for you today     Open Future Orders        Priority Expected Expires Ordered    Echocardiogram Complete Routine  2018            Who to contact     If you have questions or need follow up information about today's clinic visit or your schedule please contact [facility] directly at [phone].  Normal or non-critical lab and imaging results will be communicated to you by MyChart, letter or phone within 4 business days after the clinic has received the results. If you do not hear from us within 7 days, please contact the clinic through MyChart or phone. If you have a critical or abnormal lab result, we will notify you by phone as soon as possible.  Submit refill requests through SparCode or call your pharmacy and they will forward the refill request to us. Please allow 3 business days for your refill to be completed.          Additional Information About Your Visit        Care EveryWhere ID     This is your Care EveryWhere ID. This could be used by other organizations to access your [facility] medical records  [id number]        Your Vitals Were     Pulse Temperature Height BMI (Body Mass Index)          69 96.2  F (35.7  C) (Oral) 5' 8.75\" (1.746 m) 32.28 kg/m2         Blood Pressure from Last 3 Encounters:   17 144/69   17 122/64   10/17/16 142/65    Weight from Last 3 Encounters:   17 217 lb (98.4 kg)   17 215 lb 6.4 oz (97.7 kg)   10/17/16 215 lb (97.5 kg)                 Today's Medication Changes          These changes are accurate as of: 17  6:54 PM.  If you have any questions, ask your nurse or doctor.               Start taking these medicines.        Dose/Directions    metoprolol 25 MG 24 " hr tablet   Commonly known as:  TOPROL-XL   Used for:  Benign hypertension with chronic kidney disease, stage III   Started by:  Juan Carlos Jansen MD        Dose:  25 mg   Take 1 tablet (25 mg) by mouth daily for blood pressure.   Quantity:  90 tablet   Refills:  1       order for DME   Used for:  Bilateral edema of lower extremity   Started by:  Juan Carlos Jansen MD        Equipment being ordered: Jobst-like compression stockings, knee high, 10-15 mmHg, 3 pair   Quantity:  6 each   Refills:  3         These medicines have changed or have updated prescriptions.        Dose/Directions    amLODIPine 10 MG tablet   Commonly known as:  NORVASC   This may have changed:  how much to take   Used for:  Benign hypertension with chronic kidney disease, stage III   Changed by:  Juan Carlos Jansen MD        Dose:  5 mg   Take 0.5 tablets (5 mg) by mouth daily for blood pressure.   Refills:  0            Where to get your medicines      These medications were sent to [facility] [address] [place] [address]  [address] [facility] [address]     Phone:  [phone]     metoprolol 25 MG 24 hr tablet         Some of these will need a paper prescription and others can be bought over the counter.  Ask your nurse if you have questions.     Bring a paper prescription for each of these medications     order for DME                Primary Care Provider Office Phone # Fax #    Juan Carlos Jansen [phone]       [facility] [address] [facility] [address]        Thank you!     Thank you for choosing [facility]  for your care. Our goal is always to provide you with excellent care. Hearing back from our patients is one way we can continue to improve our services. Please take a few minutes to complete the written survey that you may receive in the mail after your visit with us. Thank you!             Your Updated Medication List - Protect others  around you: Learn how to safely use, store and throw away your medicines at www.disposemymeds.org.          This list is accurate as of: 2/16/17  6:54 PM.  Always use your most recent med list.                   Brand Name Dispense Instructions for use    amLODIPine 10 MG tablet    NORVASC     Take 0.5 tablets (5 mg) by mouth daily for blood pressure.       aspirin 81 MG tablet      1 tablet daily*       cyanocobalamin 1000 MCG Tabs      1 tablet daily. for foot neuropathy.       fluticasone 50 MCG/ACT spray    FLONASE    1 Bottle    Use 1-2 sprays each nostril daily for allergy prevention.       indomethacin 50 MG capsule    INDOCIN    40 capsule    Take 1 capsule (50 mg) by mouth 3 times daily (with meals) as needed for gouty attack.       levothyroxine 88 MCG tablet    SYNTHROID/LEVOTHROID    90 tablet    Take 1 tablet (88 mcg) by mouth daily for thyroid.       lisinopril 40 MG tablet    PRINIVIL/ZESTRIL    90 tablet    Take 1 tablet (40 mg) by mouth daily for blood pressure.       lovastatin 20 MG tablet    MEVACOR    90 tablet    TAKE ONE TABLET BY MOUTH IN THE EVENING FOR CHOLESTEROL       metoprolol 25 MG 24 hr tablet    TOPROL-XL    90 tablet    Take 1 tablet (25 mg) by mouth daily for blood pressure.       omeprazole 20 MG CR capsule    priLOSEC    180 capsule    Take 2 capsules (40 mg) by mouth daily for reflux.       order for DME     6 each    Equipment being ordered: Jobst-like compression stockings, knee high, 10-15 mmHg, 3 pair       TYLENOL 500 MG tablet   Generic drug:  acetaminophen      Take 2 tablets by mouth 2 times daily as needed for pain (knee).       VITAMIN D (CHOLECALCIFEROL) PO      Take  by mouth daily.

## 2017-02-17 NOTE — PATIENT INSTRUCTIONS
This summary includes the important diagnoses, test, medications and other important parts of your medical history.  Below are a few good we sites you can use to learn more about these.     Www.[facility] [facility].org : Up to date and easily searchable information on multiple topics.  Www."[employer].org/Pharmacy/c_[email or web address] : [facility] $4.99 medications  Www.medlineplus.gov : medication info, interactive tutorials, watch real surgeries online  Www.familydoctor.org : good info from the Academy of Family Physicians  Www.Luristicinic.com : good info from the [facility]  Www.cdc.gov : public health info, travel advisories, epidemics (H1N1)  Www.aap.org : children's health info, normal development, vaccinations  Www.health.[facility].[place].us : [place] dept of heat, public health issues in [place], N1N1    Based on your medical history and these are the current health maintenance or preventive care services that you are due for (some may have been done at this visit:)  Health Maintenance Due   Topic Date Due     MARJ QUESTIONNAIRE 1 YEAR  06/29/1927     PHQ-9 Q1YR (NO INBASKET)  06/29/1927     EYE EXAM Q1 YEAR( NO INBASKET)  08/05/2016     =================================================================================  Normal Values   Blood pressure  <140/90 for most adults    <130/80 for some chronic diseases (ask your care team about yours)    BMI (body mass index)  18.5-25 kg/m2 (based on height and weight)     Thank you for visiting [facility]    Normal or non-critical lab and imaging results will be communicated to you by MyChart, letter or phone within 7 days.  If you do not hear from us within 10 days, please call the clinic. If you have a critical or abnormal lab result, we will notify you by phone as soon as possible.     If you have any questions regarding your visit please contact:     Team Comfort:   Clinic Hours Telephone Number   Dr. Juan Carlosnino Pan  Rogelio  Rebeca Matutegiev   7am-5pm  Monday - Friday [phone]  Rylan KUMAR   Pharmacy 8am-8pm Monday-Thursday      8am-6pm Friday  9am-5pm Saturday- [phone]   Urgent Care 11am-8pm Monday-Friday        9am-5pm Saturday- [phone]     After hours, weekend or if you need to make an appointment with your primary provider please call [phone].   After Hours nurse advise: call [facility]: [phone]    Medication Refills:  Call your pharmacy and they will forward the refill to us. Please allow 3 business days for your refills to be completed.    Use [employer] (secure email communication and access to your chart) to send your primary care provider a message or make an appointment. Ask someone on your Team how to sign up for [employer]. To log on to DeYapa or for more information in Picplum please visit the website at [email or web address].  As of 2013, all password changes, disabled accounts, or ID changes in [employer]/MyHealth will be done by our Access Services Department.   If you need help with your account or password, call: [phone]. Clinic staff no longer has the ability to change passwords.                 Please call your clinic of choice to schedule your echocardiogram (heart ultrasound):    [facility]:   [phone]  [facility]:  [phone]  [facility]:  [phone]  [facility]): [phone] (Friday)  [facility]:   [phone]  [facility]:  [phone]  [place] [facility]): [phone]  [facility] [phone]

## 2017-02-17 NOTE — PROGRESS NOTES
SUBJECTIVE:                                                    James Lama is a 89 year old male who presents to clinic today for the following health issues:  He is accompanied by his wife.     Joint Pain     Onset: unsure but for awhile now (maybe a year)    Description:   Location: both feet  Character: none    Intensity: moderate    Progression of Symptoms: worsening     Accompanying Signs & Symptoms:  -Swelling at top of feet and up through ankles  -Patient thought the swelling was just related to neuropathy so didn't mention this before  -Patient reports he hasn't noticed any change in the amount of swelling at different times of day     History:   Previous similar pain: no       Precipitating factors:   Trauma or overuse: no   -Patient's wife reports the patient eats a lot of salty foods.   -Patient's amlodipine dose was increased from 5 mg to 10 mg about 1.5 years ago    Alleviating factors:  Improved by: nothing    -Patient reports he has been on a water pill before but does not like it because he was constantly going to the bathroom.       Therapies Tried and outcome: none      Past medical, family, and social histories, medications, and allergies are reviewed and updated in Epic.     ROS:  C: NEGATIVE for fever, chills, change in weight  E/M: NEGATIVE for ear, mouth and throat problems  R: NEGATIVE for significant cough or SOB  CV: NEGATIVE for chest pain, palpitations.  PSYCHIATRIC: PHQ- 9 = 5.   ROS otherwise negative      Any history above obtained by the Medical Assistant was reviewed by Dr. Juan Carlos Jansen MD, and edited when necessary.    This document serves as a record of the services and decisions personally performed and made by Dr. Jansen. It was created on his behalf by Kenia So, a trained medical scribe. The creation of this document is based on the provider's statements to the medical scribe.  Kenia So February 16, 2017 6:30 PM   OBJECTIVE:                                    "                 /69 (BP Location: Left arm, Patient Position: Chair, Cuff Size: Adult Regular)  Pulse 69  Temp 96.2  F (35.7  C) (Oral)  Ht 5' 8.75\" (1.746 m)  Wt 217 lb (98.4 kg)  BMI 32.28 kg/m2  Body mass index is 32.28 kg/(m^2).  GENERAL: healthy, alert and no distress  EYES: Eyes grossly normal to inspection, PERRL and conjunctivae and sclerae normal  MS: no gross musculoskeletal defects noted. He has 1-2+ foot and ankle edema and trace pitting 1/3 up the tibia bilaterally. No erythema or warmth. No skin breakdown.   SKIN: no suspicious lesions or rashes (bandage on right temple from recent derm visit).  NEURO: Normal strength and tone, mentation intact and speech normal, cranial nerves 2-12 intact   PSYCH: mentation appears normal, affect normal/bright     Diagnostic Test Results:  Results for orders placed or performed in visit on 01/27/17   Lipid panel reflex to direct LDL   Result Value Ref Range    Cholesterol 156 <200 mg/dL    Triglycerides 151 (H) <150 mg/dL    HDL Cholesterol 46 >39 mg/dL    LDL Cholesterol Calculated 80 <100 mg/dL    Non HDL Cholesterol 110 <130 mg/dL   ALT   Result Value Ref Range    ALT 20 0 - 70 U/L   Basic metabolic panel   Result Value Ref Range    Sodium 141 133 - 144 mmol/L    Potassium 4.3 3.4 - 5.3 mmol/L    Chloride 107 94 - 109 mmol/L    Carbon Dioxide 26 20 - 32 mmol/L    Anion Gap 8 3 - 14 mmol/L    Glucose 106 (H) 70 - 99 mg/dL    Urea Nitrogen 22 7 - 30 mg/dL    Creatinine 1.44 (H) 0.66 - 1.25 mg/dL    GFR Estimate 46 (L) >60 mL/min/1.7m2    GFR Estimate If Black 56 (L) >60 mL/min/1.7m2    Calcium 9.4 8.5 - 10.1 mg/dL   TSH with free T4 reflex   Result Value Ref Range    TSH 4.34 (H) 0.40 - 4.00 mU/L   Uric acid   Result Value Ref Range    Uric Acid 6.6 3.5 - 7.2 mg/dL   PSA, tumor marker   Result Value Ref Range    PSA 0.17 0 - 4 ug/L   T4 free   Result Value Ref Range    T4 Free 1.18 0.76 - 1.46 ng/dL        ASSESSMENT/PLAN:                                       "              (R60.0) Bilateral edema of lower extremity  (primary encounter diagnosis)  Comment: Chronic, unknown etiology but we can pursue improvement in several directions (venous insufficiency, CCB side effect, rule out CHF).   Plan: Echocardiogram Complete, order for DME        Follow up in 2 weeks    (I12.9,  N18.3) Benign hypertension with chronic kidney disease, stage III  Comment: borderline control. I am cutting his amlodipine dose back to 5 mg (May 2015) and adding metoprolol.  Plan: amLODIPine (NORVASC) 10 MG tablet, metoprolol         (TOPROL-XL) 25 MG 24 hr tablet        Recheck in 2 weeks       The information in this document, created by the medical scribe for me, accurately reflects the services I personally performed and the decisions made by me. I have reviewed and approved this document for accuracy prior to leaving the patient care area.  Juan Carlos Jansen MD

## 2017-02-17 NOTE — NURSING NOTE
"Chief Complaint   Patient presents with     Musculoskeletal Problem     feet swelling       Initial /69 (BP Location: Left arm, Patient Position: Chair, Cuff Size: Adult Regular)  Pulse 69  Temp 96.2  F (35.7  C) (Oral)  Ht 5' 8.75\" (1.746 m)  Wt 217 lb (98.4 kg)  BMI 32.28 kg/m2 Estimated body mass index is 32.28 kg/(m^2) as calculated from the following:    Height as of this encounter: 5' 8.75\" (1.746 m).    Weight as of this encounter: 217 lb (98.4 kg).  Medication Reconciliation: complete     Anatoliy Gutierrez MA      "

## 2017-02-18 ASSESSMENT — PATIENT HEALTH QUESTIONNAIRE - PHQ9: SUM OF ALL RESPONSES TO PHQ QUESTIONS 1-9: 5

## 2017-02-20 ENCOUNTER — RADIANT APPOINTMENT (OUTPATIENT)
Dept: CARDIOLOGY | Facility: CLINIC | Age: 82
End: 2017-02-20
Attending: FAMILY MEDICINE
Payer: COMMERCIAL

## 2017-02-20 DIAGNOSIS — R60.0 BILATERAL EDEMA OF LOWER EXTREMITY: ICD-10-CM

## 2017-02-20 LAB — COPATH REPORT: NORMAL

## 2017-02-20 PROCEDURE — 93306 TTE W/DOPPLER COMPLETE: CPT

## 2017-02-28 ENCOUNTER — TELEPHONE (OUTPATIENT)
Dept: DERMATOLOGY | Facility: CLINIC | Age: 82
End: 2017-02-28

## 2017-02-28 NOTE — TELEPHONE ENCOUNTER
Writer called and spoke with patient regarding pathology results. Patient understood and had no further questions at this time. Patient prefers a reminder to call patient for a one year follow up for Feb 2018.    Surgical pathology exam   Status:  Final result   Visible to patient:  No (Not Released) Dx:  Neoplasm of uncertain behavior of skin Order: [id number]       Notes Recorded by Blanca Chase MD on 2/27/2017 at 12:45 PM  PArt A was benign  Part B- patient should be notified it was mildly atypical or dysplastic and he will need a skin exam again in 1 year        2wk ago       Copath Report Patient Name: RAJ LYNN   MR#: [id number]   Specimen #:    Collected: 2/14/2017   Received: 2/15/2017   Reported: 2/20/2017 11:38   Ordering Phy(s): BLANCA CHASE     For improved result formatting, select 'View Enhanced Report Format'   under Linked Documents section.     SPECIMEN(S):   A: Skin, right sideburn   B: Skin, left lower back     FINAL DIAGNOSIS:   A.  Skin, sideburn, right:   - Macular seborrheic keratosis - (see description)     B.  Skin, back, left lower:   - Junctional dysplastic nevus with mild atypia - (see comment)                Susi Keene LPN

## 2017-03-02 ENCOUNTER — OFFICE VISIT (OUTPATIENT)
Dept: FAMILY MEDICINE | Facility: CLINIC | Age: 82
End: 2017-03-02
Payer: COMMERCIAL

## 2017-03-02 VITALS
HEIGHT: 69 IN | WEIGHT: 215 LBS | DIASTOLIC BLOOD PRESSURE: 58 MMHG | HEART RATE: 67 BPM | SYSTOLIC BLOOD PRESSURE: 132 MMHG | BODY MASS INDEX: 31.84 KG/M2 | OXYGEN SATURATION: 98 % | TEMPERATURE: 96.6 F

## 2017-03-02 DIAGNOSIS — I12.9 BENIGN HYPERTENSION WITH CHRONIC KIDNEY DISEASE, STAGE III (H): Primary | ICD-10-CM

## 2017-03-02 DIAGNOSIS — R60.0 BILATERAL EDEMA OF LOWER EXTREMITY: ICD-10-CM

## 2017-03-02 DIAGNOSIS — N18.30 BENIGN HYPERTENSION WITH CHRONIC KIDNEY DISEASE, STAGE III (H): Primary | ICD-10-CM

## 2017-03-02 DIAGNOSIS — Z71.89 ADVANCE CARE PLANNING: Chronic | ICD-10-CM

## 2017-03-02 PROCEDURE — 99214 OFFICE O/P EST MOD 30 MIN: CPT | Performed by: FAMILY MEDICINE

## 2017-03-02 RX ORDER — AMLODIPINE BESYLATE 5 MG/1
5 TABLET ORAL DAILY
Qty: 90 TABLET | Refills: 0 | Status: SHIPPED | OUTPATIENT
Start: 2017-03-02 | End: 2018-01-24

## 2017-03-02 ASSESSMENT — ANXIETY QUESTIONNAIRES
1. FEELING NERVOUS, ANXIOUS, OR ON EDGE: NOT AT ALL
7. FEELING AFRAID AS IF SOMETHING AWFUL MIGHT HAPPEN: NOT AT ALL
GAD7 TOTAL SCORE: 0
IF YOU CHECKED OFF ANY PROBLEMS ON THIS QUESTIONNAIRE, HOW DIFFICULT HAVE THESE PROBLEMS MADE IT FOR YOU TO DO YOUR WORK, TAKE CARE OF THINGS AT HOME, OR GET ALONG WITH OTHER PEOPLE: NOT DIFFICULT AT ALL
6. BECOMING EASILY ANNOYED OR IRRITABLE: NOT AT ALL
3. WORRYING TOO MUCH ABOUT DIFFERENT THINGS: NOT AT ALL
5. BEING SO RESTLESS THAT IT IS HARD TO SIT STILL: NOT AT ALL
2. NOT BEING ABLE TO STOP OR CONTROL WORRYING: NOT AT ALL

## 2017-03-02 ASSESSMENT — PATIENT HEALTH QUESTIONNAIRE - PHQ9: 5. POOR APPETITE OR OVEREATING: NOT AT ALL

## 2017-03-02 ASSESSMENT — PAIN SCALES - GENERAL: PAINLEVEL: NO PAIN (0)

## 2017-03-02 NOTE — MR AVS SNAPSHOT
After Visit Summary   3/2/2017    James Lama    MRN: [id number]           Patient Information     Date Of Birth          6/28/1927        Visit Information        Provider Department      3/2/2017 3:20 PM Juan Carlos Jansen MD [facility]        Today's Diagnoses     Benign hypertension with chronic kidney disease, stage III    -  1    Bilateral edema of lower extremity        Advance care planning          Care Instructions        ================================================================================  Normal Values   Blood pressure  <140/90 for most adults    <130/80 for some chronic diseases (ask your care team about yours)    BMI (body mass index)  18.5-25 kg/m2 (based on height and weight)     Thank you for visiting [facility]    Normal or non-critical lab and imaging results will be communicated to you by MyChart, letter or phone within 7 days.  If you do not hear from us within 10 days, please call the clinic. If you have a critical or abnormal lab result, we will notify you by phone as soon as possible.     If you have any questions regarding your visit please contact:     Team Comfort:   Clinic Hours Telephone Number   Dr. Juan Carlos Calderon 7am-5pm  Monday - Friday [phone]  Eusebio Bal RN   Pharmacy 8:00am-8pm Monday-Friday    9am-5pm Saturday-Sunday [phone]   Urgent Care 11am-9pm Monday-Friday        9am-5pm Saturday-Sunday [phone]     After hours, weekend or if you need to make an appointment with your primary provider please call [phone].   After Hours nurse advise: call [facility]: [phone]    Medication Refills:  Call your pharmacy and they will forward the refill to us. Please allow 3 business days for your refills to be completed.                Follow-ups after your visit        Follow-up notes from your care team      "Return in about 5 months (around 7/20/2017) for blood pressure check.      Who to contact     If you have questions or need follow up information about today's clinic visit or your schedule please contact [facility] LIDIA MEJIAS directly at [phone].  Normal or non-critical lab and imaging results will be communicated to you by MyChart, letter or phone within 4 business days after the clinic has received the results. If you do not hear from us within 7 days, please contact the clinic through MyChart or phone. If you have a critical or abnormal lab result, we will notify you by phone as soon as possible.  Submit refill requests through [employer] or call your pharmacy and they will forward the refill request to us. Please allow 3 business days for your refill to be completed.          Additional Information About Your Visit        Care EveryWhere ID     This is your Care EveryWhere ID. This could be used by other organizations to access your [place] medical records  [id number]        Your Vitals Were     Pulse Temperature Height Pulse Oximetry BMI (Body Mass Index)       67 96.6  F (35.9  C) (Oral) 5' 8.75\" (1.746 m) 98% 31.98 kg/m2        Blood Pressure from Last 3 Encounters:   03/02/17 132/58   02/16/17 144/69   01/27/17 122/64    Weight from Last 3 Encounters:   03/02/17 215 lb (97.5 kg)   02/16/17 217 lb (98.4 kg)   01/27/17 215 lb 6.4 oz (97.7 kg)              Today, you had the following     No orders found for display         Today's Medication Changes          These changes are accurate as of: 3/2/17  3:33 PM.  If you have any questions, ask your nurse or doctor.               These medicines have changed or have updated prescriptions.        Dose/Directions    amLODIPine 5 MG tablet   Commonly known as:  NORVASC   This may have changed:  medication strength   Used for:  Benign hypertension with chronic kidney disease, stage III   Changed by:  Juan Carlos Jansen MD        Dose:  5 mg   Take 1 " tablet (5 mg) by mouth daily for blood pressure.   Quantity:  90 tablet   Refills:  0            Where to get your medicines      These medications were sent to [facility] [address] [place] [address] - [address] [facility]  [address] [facility] [address]     Phone:  [phone]     amLODIPine 5 MG tablet                Primary Care Provider Office Phone # Fax #    Juan Carlos Jansen [phone]       [facility] [address]  [facility] [address]        Thank you!     Thank you for choosing [facility]  for your care. Our goal is always to provide you with excellent care. Hearing back from our patients is one way we can continue to improve our services. Please take a few minutes to complete the written survey that you may receive in the mail after your visit with us. Thank you!             Your Updated Medication List - Protect others around you: Learn how to safely use, store and throw away your medicines at www.disposemymeds.org.          This list is accurate as of: 3/2/17  3:33 PM.  Always use your most recent med list.                   Brand Name Dispense Instructions for use    amLODIPine 5 MG tablet    NORVASC    90 tablet    Take 1 tablet (5 mg) by mouth daily for blood pressure.       aspirin 81 MG tablet      1 tablet daily*       cyanocobalamin 1000 MCG Tabs      1 tablet daily. for foot neuropathy.       fluticasone 50 MCG/ACT spray    FLONASE    1 Bottle    Use 1-2 sprays each nostril daily for allergy prevention.       indomethacin 50 MG capsule    INDOCIN    40 capsule    Take 1 capsule (50 mg) by mouth 3 times daily (with meals) as needed for gouty attack.       levothyroxine 88 MCG tablet    SYNTHROID/LEVOTHROID    90 tablet    Take 1 tablet (88 mcg) by mouth daily for thyroid.       lisinopril 40 MG tablet    PRINIVIL/ZESTRIL    90 tablet    Take 1 tablet (40 mg) by mouth daily for blood pressure.       lovastatin 20 MG tablet     MEVACOR    90 tablet    TAKE ONE TABLET BY MOUTH IN THE EVENING FOR CHOLESTEROL       metoprolol 25 MG 24 hr tablet    TOPROL-XL    90 tablet    Take 1 tablet (25 mg) by mouth daily for blood pressure.       omeprazole 20 MG CR capsule    priLOSEC    180 capsule    Take 2 capsules (40 mg) by mouth daily for reflux.       TYLENOL 500 MG tablet   Generic drug:  acetaminophen      Take 2 tablets by mouth 2 times daily as needed for pain (knee).       VITAMIN D (CHOLECALCIFEROL) PO      Take  by mouth daily.

## 2017-03-02 NOTE — NURSING NOTE
"Chief Complaint   Patient presents with     Hypertension       Initial /46  Pulse 67  Temp 96.6  F (35.9  C) (Oral)  Ht 5' 8.75\" (1.746 m)  Wt 215 lb (97.5 kg)  SpO2 98%  BMI 31.98 kg/m2 Estimated body mass index is 31.98 kg/(m^2) as calculated from the following:    Height as of this encounter: 5' 8.75\" (1.746 m).    Weight as of this encounter: 215 lb (97.5 kg).  Medication Reconciliation: hans Maier MA      "

## 2017-03-02 NOTE — PATIENT INSTRUCTIONS
================================================================================  Normal Values   Blood pressure  <140/90 for most adults    <130/80 for some chronic diseases (ask your care team about yours)    BMI (body mass index)  18.5-25 kg/m2 (based on height and weight)     Thank you for visiting [facility]    Normal or non-critical lab and imaging results will be communicated to you by MyChart, letter or phone within 7 days.  If you do not hear from us within 10 days, please call the clinic. If you have a critical or abnormal lab result, we will notify you by phone as soon as possible.     If you have any questions regarding your visit please contact:     Team Comfort:   Clinic Hours Telephone Number   Dr. Juan Carlos Calderon 7am-5pm  Monday - Friday [phone]  Eusebio Bal RN   Pharmacy 8:00am-8pm Monday-Friday    9am-5pm Saturday-Sunday [phone]   Urgent Care 11am-9pm Monday-Friday        9am-5pm Saturday-Sunday [phone]     After hours, weekend or if you need to make an appointment with your primary provider please call [phone].   After Hours nurse advise: call [facility]: [phone]    Medication Refills:  Call your pharmacy and they will forward the refill to us. Please allow 3 business days for your refills to be completed.

## 2017-03-02 NOTE — PROGRESS NOTES
"  SUBJECTIVE:                                                    James Lama is a 89 year old male who presents to clinic today for the following health issues:  He is accompanied by his wife.     Hypertension Follow-up      Outpatient blood pressures are not being checked.    Low Salt Diet: low salt       Amount of exercise or physical activity: started this week    Problems taking medications regularly: No    Medication side effects: none    Diet: regular (no restrictions) and low salt    Past medical, family, and social histories, medications, and allergies are reviewed and updated in Epic.     ROS:  C: NEGATIVE for fever, chills, change in weight  E/M: NEGATIVE for ear, mouth and throat problems  R: NEGATIVE for significant cough or SOB  CV: NEGATIVE for chest pain or palpitations. Patient reports he has been wearing his compression stockings and his ankle swelling has gone down significantly. He reports noticing improvements after wearing them for only 2 days.  GI: Patient's wife reports the patient complains of stomach problems and frequent gas. Patient has tried/failed Gas-X, Imodium, and Beano. He questions if adding a probiotic would help. [Maybe, try in combo with Beano.]  PSYCH: MARJ- 7 = 0.  ROS otherwise negative      Any history above obtained by the Medical Assistant was reviewed by Dr. Juan Carlos Jansen MD, and edited when necessary.    This document serves as a record of the services and decisions personally performed and made by Dr. Jansen. It was created on his behalf by Kenia So, a trained medical scribe. The creation of this document is based on the provider's statements to the medical scribe.  Kenia So March 2, 2017 3:18 PM   OBJECTIVE:                                                    /58  Pulse 67  Temp 96.6  F (35.9  C) (Oral)  Ht 5' 8.75\" (1.746 m)  Wt 215 lb (97.5 kg)  SpO2 98%  BMI 31.98 kg/m2  Body mass index is 31.98 kg/(m^2).  GENERAL: healthy, alert and no " distress  EYES: Eyes grossly normal to inspection, PERRL and conjunctivae and sclerae normal  RESP: lungs clear to auscultation - no rales, rhonchi or wheezes  CV: regular rate and rhythm, normal S1 S2, no S3 or S4, no murmur, click or rub, no peripheral edema and peripheral pulses strong. He is wearing his compression stockings and now his lower extremity edema is completely resolved.   MS: no gross musculoskeletal defects noted, no edema  SKIN: no suspicious lesions or rashes  NEURO: Normal strength and tone, mentation intact and speech normal, cranial nerves 2-12 intact   PSYCH: mentation appears normal, affect normal/bright     ASSESSMENT/PLAN:                                                    (I12.9,  N18.3) Benign hypertension with chronic kidney disease, stage III  (primary encounter diagnosis)  Comment: well controlled on current regimen. When his amlodipine supply runs out, he can switch to the 5 mg pills.   Plan: amLODIPine (NORVASC) 5 MG tablet        Follow up in 5 months, around 7/20/17    (R60.0) Bilateral edema of lower extremity  Comment: resolved with the use of compression stockings.   Plan: Continue compression stockings.       The information in this document, created by the medical scribe for me, accurately reflects the services I personally performed and the decisions made by me. I have reviewed and approved this document for accuracy prior to leaving the patient care area.  Juan Carlos Jansen MD

## 2017-03-03 ASSESSMENT — ANXIETY QUESTIONNAIRES: GAD7 TOTAL SCORE: 0

## 2017-04-06 ENCOUNTER — RADIANT APPOINTMENT (OUTPATIENT)
Dept: GENERAL RADIOLOGY | Facility: CLINIC | Age: 82
End: 2017-04-06
Attending: FAMILY MEDICINE
Payer: COMMERCIAL

## 2017-04-06 ENCOUNTER — OFFICE VISIT (OUTPATIENT)
Dept: FAMILY MEDICINE | Facility: CLINIC | Age: 82
End: 2017-04-06
Payer: COMMERCIAL

## 2017-04-06 VITALS
BODY MASS INDEX: 32.44 KG/M2 | OXYGEN SATURATION: 98 % | SYSTOLIC BLOOD PRESSURE: 141 MMHG | WEIGHT: 219 LBS | HEART RATE: 60 BPM | DIASTOLIC BLOOD PRESSURE: 65 MMHG | HEIGHT: 69 IN | TEMPERATURE: 96.6 F

## 2017-04-06 DIAGNOSIS — S09.90XA CLOSED HEAD INJURY, INITIAL ENCOUNTER: ICD-10-CM

## 2017-04-06 DIAGNOSIS — S46.912A LEFT SHOULDER STRAIN, INITIAL ENCOUNTER: ICD-10-CM

## 2017-04-06 DIAGNOSIS — S46.912A LEFT SHOULDER STRAIN, INITIAL ENCOUNTER: Primary | ICD-10-CM

## 2017-04-06 PROCEDURE — 73030 X-RAY EXAM OF SHOULDER: CPT | Mod: LT

## 2017-04-06 PROCEDURE — 99214 OFFICE O/P EST MOD 30 MIN: CPT | Performed by: FAMILY MEDICINE

## 2017-04-06 ASSESSMENT — PAIN SCALES - GENERAL: PAINLEVEL: MODERATE PAIN (4)

## 2017-04-06 NOTE — MR AVS SNAPSHOT
After Visit Summary   4/6/2017    James Lama    MRN: [id number]           Patient Information     Date Of Birth          6/28/1927        Visit Information        Provider Department      4/6/2017 3:00 PM Juan Carlos Jansen MD [facility]        Today's Diagnoses     Left shoulder strain, initial encounter    -  1      Care Instructions        ================================================================================  Normal Values   Blood pressure  <140/90 for most adults    <130/80 for some chronic diseases (ask your care team about yours)    BMI (body mass index)  18.5-25 kg/m2 (based on height and weight)     Thank you for visiting [facility]    Normal or non-critical lab and imaging results will be communicated to you by MyChart, letter or phone within 7 days.  If you do not hear from us within 10 days, please call the clinic. If you have a critical or abnormal lab result, we will notify you by phone as soon as possible.     If you have any questions regarding your visit please contact:     Team Comfort:   Clinic Hours Telephone Number   Dr. Juan Carlos Calderon 7am-5pm  Monday - Friday [phone]  Eusebio Bal RN   Pharmacy 8:00am-8pm Monday-Friday    9am-5pm Saturday-Sunday [phone]   Urgent Care 11am-9pm Monday-Friday        9am-5pm Saturday-Sunday [phone]     After hours, weekend or if you need to make an appointment with your primary provider please call [phone].   After Hours nurse advise: call [facility]: [phone]    Medication Refills:  Call your pharmacy and they will forward the refill to us. Please allow 3 business days for your refills to be completed.           * HEAD INJURY, no wake-up (Adult)    You have had a head injury. It does not appear serious at this time. Sometimes symptoms of a more serious problem (concussion,  bruising or bleeding in the brain) may appear later. Therefore, watch for the warning signs listed below.  HOME CARE:    During the next 24 hours someone must stay with you to check for the signs below. It is not necessary to stay awake or be awakened during the night.    If you have swelling of the face or scalp, apply an ice pack (ice cubes in a plastic bag, wrapped in a towel) for 20 minutes. Do this every 1-2 hours until the swelling starts to go down.    You may use acetaminophen (Tylenol) 650-1000 mg every 6 hours or ibuprofen (Motrin, Advil) 600 mg every 6-8 hours with food to control pain, if you are able to take these medicines. [NOTE: If you have chronic liver or kidney disease or ever had a stomach ulcer or GI bleeding, talk with your doctor before using these medicines.] Do not take aspirin after a head injury.    For the next 24 hours:    Do not take alcohol, sedatives or medicines that make you sleepy.    Do not drive or operate machinery.    Avoid strenuous activities. No lifting or straining.    If you have had any symptoms of a concussion today (nausea, vomiting, dizziness, confusion, headache, memory loss or if you were knocked out), do not return to sports or any activity that could result in another head injury until 2 full weeks after all symptoms are gone and you have been cleared by your doctor. A second head injury before fully recovering from the first one can lead to serious brain injury.  FOLLOW UP with your doctor if symptoms are not improving after 24 hours, or as directed.  GET PROMPT MEDICAL ATTENTION if any of the following warning signs occur:    Repeated vomiting    Severe or worsening headache or dizziness    Unusual drowsiness, or unable to awaken as usual    Confusion or change in behavior or speech, memory loss, blurred vision    Convulsion (seizure)    Increasing scalp or face swelling    Redness, warmth or pus from the swollen area    Fluid drainage or bleeding from the nose  "or ears    8192-9949 [facility], [address] [place] [address], [place] [address]. All rights reserved. This information is not intended as a substitute for professional medical care. Always follow your healthcare professional's instructions.          Follow-ups after your visit        Follow-up notes from your care team     Return in about 5 weeks (around 5/13/2017) for if shoulder symptoms worsen or fail to resolve by then.      Your next 10 appointments already scheduled     Apr 26, 2017 11:00 AM CDT   New Visit with Maulik Winslow MD   [facility] ([facility]    [address] [address] [place] [address] [phone]              Who to contact     If you have questions or need follow up information about today's clinic visit or your schedule please contact [facility] directly at [phone].  Normal or non-critical lab and imaging results will be communicated to you by MyChart, letter or phone within 4 business days after the clinic has received the results. If you do not hear from us within 7 days, please contact the clinic through MyChart or phone. If you have a critical or abnormal lab result, we will notify you by phone as soon as possible.  Submit refill requests through [employer] or call your pharmacy and they will forward the refill request to us. Please allow 3 business days for your refill to be completed.          Additional Information About Your Visit        Care EveryWhere ID     This is your Care EveryWhere ID. This could be used by other organizations to access your [place] medical records  [id number]        Your Vitals Were     Pulse Temperature Height Pulse Oximetry BMI (Body Mass Index)       60 96.6  F (35.9  C) (Oral) 5' 8.75\" (1.746 m) 98% 32.58 kg/m2        Blood Pressure from Last 3 Encounters:   04/06/17 141/65   03/02/17 132/58   02/16/17 144/69    Weight from Last 3 Encounters:   04/06/17 219 lb (99.3 kg)   03/02/17 215 " lb (97.5 kg)   02/16/17 217 lb (98.4 kg)               Primary Care Provider Office Phone # Fax #    Juan Carlos Jansen [phone]       [facility] [address]  [facility] [address]        Thank you!     Thank you for choosing [facility]  for your care. Our goal is always to provide you with excellent care. Hearing back from our patients is one way we can continue to improve our services. Please take a few minutes to complete the written survey that you may receive in the mail after your visit with us. Thank you!             Your Updated Medication List - Protect others around you: Learn how to safely use, store and throw away your medicines at www.disposemymeds.org.          This list is accurate as of: 4/6/17  3:59 PM.  Always use your most recent med list.                   Brand Name Dispense Instructions for use    amLODIPine 5 MG tablet    NORVASC    90 tablet    Take 1 tablet (5 mg) by mouth daily for blood pressure.       aspirin 81 MG tablet      1 tablet daily*       cyanocobalamin 1000 MCG Tabs      1 tablet daily. for foot neuropathy.       fluticasone 50 MCG/ACT spray    FLONASE    1 Bottle    Use 1-2 sprays each nostril daily for allergy prevention.       indomethacin 50 MG capsule    INDOCIN    40 capsule    Take 1 capsule (50 mg) by mouth 3 times daily (with meals) as needed for gouty attack.       levothyroxine 88 MCG tablet    SYNTHROID/LEVOTHROID    90 tablet    Take 1 tablet (88 mcg) by mouth daily for thyroid.       lisinopril 40 MG tablet    PRINIVIL/ZESTRIL    90 tablet    Take 1 tablet (40 mg) by mouth daily for blood pressure.       lovastatin 20 MG tablet    MEVACOR    90 tablet    TAKE ONE TABLET BY MOUTH IN THE EVENING FOR CHOLESTEROL       metoprolol 25 MG 24 hr tablet    TOPROL-XL    90 tablet    Take 1 tablet (25 mg) by mouth daily for blood pressure.       omeprazole 20 MG CR capsule    priLOSEC    180 capsule    Take 2 capsules  (40 mg) by mouth daily for reflux.       TYLENOL 500 MG tablet   Generic drug:  acetaminophen      Take 2 tablets by mouth 2 times daily as needed for pain (knee).       VITAMIN D (CHOLECALCIFEROL) PO      Take  by mouth daily.

## 2017-04-06 NOTE — NURSING NOTE
"Chief Complaint   Patient presents with     Shoulder     Left       Initial /65 (BP Location: Left arm, Patient Position: Chair, Cuff Size: Adult Regular)  Pulse 60  Temp 96.6  F (35.9  C) (Oral)  Ht 5' 8.75\" (1.746 m)  Wt 219 lb (99.3 kg)  SpO2 98%  BMI 32.58 kg/m2 Estimated body mass index is 32.58 kg/(m^2) as calculated from the following:    Height as of this encounter: 5' 8.75\" (1.746 m).    Weight as of this encounter: 219 lb (99.3 kg).  Medication Reconciliation: complete   DELMY Maier MA      "

## 2017-04-06 NOTE — PATIENT INSTRUCTIONS
================================================================================  Normal Values   Blood pressure  <140/90 for most adults    <130/80 for some chronic diseases (ask your care team about yours)    BMI (body mass index)  18.5-25 kg/m2 (based on height and weight)     Thank you for visiting [facility]    Normal or non-critical lab and imaging results will be communicated to you by MyChart, letter or phone within 7 days.  If you do not hear from us within 10 days, please call the clinic. If you have a critical or abnormal lab result, we will notify you by phone as soon as possible.     If you have any questions regarding your visit please contact:     Team Comfort:   Clinic Hours Telephone Number   Dr. Juan Carlos Calderon 7am-5pm  Monday - Friday [phone]  Eusebio Bal RN   Pharmacy 8:00am-8pm Monday-Friday    9am-5pm Saturday-Sunday [phone]   Urgent Care 11am-9pm Monday-Friday        9am-5pm Saturday-Sunday [phone]     After hours, weekend or if you need to make an appointment with your primary provider please call [phone].   After Hours nurse advise: call [facility]: [phone]    Medication Refills:  Call your pharmacy and they will forward the refill to us. Please allow 3 business days for your refills to be completed.           * HEAD INJURY, no wake-up (Adult)    You have had a head injury. It does not appear serious at this time. Sometimes symptoms of a more serious problem (concussion, bruising or bleeding in the brain) may appear later. Therefore, watch for the warning signs listed below.  HOME CARE:    During the next 24 hours someone must stay with you to check for the signs below. It is not necessary to stay awake or be awakened during the night.    If you have swelling of the face or scalp, apply an ice pack (ice cubes in a plastic bag, wrapped in a towel)  for 20 minutes. Do this every 1-2 hours until the swelling starts to go down.    You may use acetaminophen (Tylenol) 650-1000 mg every 6 hours or ibuprofen (Motrin, Advil) 600 mg every 6-8 hours with food to control pain, if you are able to take these medicines. [NOTE: If you have chronic liver or kidney disease or ever had a stomach ulcer or GI bleeding, talk with your doctor before using these medicines.] Do not take aspirin after a head injury.    For the next 24 hours:    Do not take alcohol, sedatives or medicines that make you sleepy.    Do not drive or operate machinery.    Avoid strenuous activities. No lifting or straining.    If you have had any symptoms of a concussion today (nausea, vomiting, dizziness, confusion, headache, memory loss or if you were knocked out), do not return to sports or any activity that could result in another head injury until 2 full weeks after all symptoms are gone and you have been cleared by your doctor. A second head injury before fully recovering from the first one can lead to serious brain injury.  FOLLOW UP with your doctor if symptoms are not improving after 24 hours, or as directed.  GET PROMPT MEDICAL ATTENTION if any of the following warning signs occur:    Repeated vomiting    Severe or worsening headache or dizziness    Unusual drowsiness, or unable to awaken as usual    Confusion or change in behavior or speech, memory loss, blurred vision    Convulsion (seizure)    Increasing scalp or face swelling    Redness, warmth or pus from the swollen area    Fluid drainage or bleeding from the nose or ears    6021-5087 [place] [address]. All rights reserved. This information is not intended as a substitute for professional medical care. Always follow your healthcare professional's instructions.

## 2017-04-06 NOTE — PROGRESS NOTES
"  SUBJECTIVE:                                                    James Lama is a 89 year old male who presents to clinic today for the following health issues:  He is accompanied by his wife.     Joint Pain     Onset: 4/1/17    Description:   Location: left shoulder  Character: Dull ache    Intensity: 4/10    Progression of Symptoms: intermittent    Accompanying Signs & Symptoms:  -Movement elicits pain  -[place] click in left shoulder when rolled over in bed on 4/2/17 night       History:   Previous similar pain: no     -Hx left shoulder bursitis     Precipitating factors:   Trauma or overuse: YES- fell 4/1/17: Patient reports he tipped over in the shower and fell out the shower door, hit his head on the toilet and fell on his left shoulder. Patient denies passing out/losing consciousness from hitting his head. Patient's wife reports he just stood up and was fine after he fell.    Alleviating factors:  Improved by: nothing       Therapies Tried and outcome: Aleve      Past medical, family, and social histories, medications, and allergies are reviewed and updated in Epic.     ROS:  Constitutional, HEENT, cardiovascular, pulmonary, gi and gu systems are negative, except as otherwise noted.      Any history above obtained by the Medical Assistant was reviewed by Dr. Juan Carlos Jansen MD, and edited when necessary.    This document serves as a record of the services and decisions personally performed and made by Dr. Jansen. It was created on his behalf by Kenia So, a trained medical scribe. The creation of this document is based on the provider's statements to the medical scribe.  Kenia So April 6, 2017 3:11 PM   OBJECTIVE:                                                    /65  Pulse 60  Temp 96.6  F (35.9  C) (Oral)  Ht 5' 8.75\" (1.746 m)  Wt 219 lb (99.3 kg)  SpO2 98%  BMI 32.58 kg/m2  Body mass index is 32.58 kg/(m^2).  GENERAL: healthy, alert and no distress  EYES: Eyes grossly normal to " inspection, PERRL and conjunctivae and sclerae normal, no raccoons eye.  HENT: He has an old bruise on the left upper forehead next to the hairline, there is no swelling or fluid collection there, and there is no palpable disruption of the skull surface. Ear canals and TM's normal, no blood behind the TMs, nose and mouth without ulcers or lesions.   RESP: lungs clear to auscultation - no rales, rhonchi or wheezes  CV: regular rate and rhythm, normal S1 S2, no S3 or S4, no murmur, click or rub, no peripheral edema and peripheral pulses strong  MS: no gross musculoskeletal defects noted, no edema. Pain with active and resisted abduction of the left shoulder up to horizontal (less so above horizontal).  SKIN: no suspicious lesions or rashes  NEURO: Normal strength and tone, mentation intact and speech normal, cranial nerves 2-12 intact   PSYCH: mentation appears normal, affect normal/bright      A left shoulder X-Ray was ordered. My reading of this film is negative for fracture. I do note some mild degenerative changes at the AC joint. (No comparison films available: pending review by Radiologist.)      ASSESSMENT/PLAN:                                                    (S46.912A) Left shoulder strain, initial encounter  (primary encounter diagnosis)  Comment: Supraspinatus tendon strain, rule out tear  Plan: XR Shoulder Left G/E 3 Views        Follow up if symptoms worsen or fail to resolve by 5/13/17.    (S09.90XA) Closed head injury, initial encounter  Comment: mild without LOC. Mild bruise to forehead  Plan:  Patient handout provided - Subdural Hematoma (The Basics) by JasminToDanadine. I discussed these recommendations in detail with the patient and his wife.           The information in this document, created by the medical scribe for me, accurately reflects the services I personally performed and the decisions made by me. I have reviewed and approved this document for accuracy prior to leaving the patient care  area.    Juan Carlos Jansen MD

## 2017-04-17 ENCOUNTER — PRE VISIT (OUTPATIENT)
Dept: OPHTHALMOLOGY | Facility: CLINIC | Age: 82
End: 2017-04-17

## 2017-04-17 NOTE — TELEPHONE ENCOUNTER
Consult Referred by Dr Loredo from Dermatology clinic in [facility].    For the evaluation of   Chief Complaint   Patient presents with     Previsit     appt w/ Dr Winslow     Referral     from Dr Loredo     Lesion On Left Upper Lid       When was your last eye exam w/ Dilation? not applicable  Do you wear glasses?  Please bring them with you to your appointment.  Do you wear contacts?   How many hours per day to you wear your lenses?   Please bring the boxes with you to your appointment.      HPI:  Location:   OS Upper eye lid    Symptoms:   Negative for vision changes or eye problems  Pertinent Negatives:   Negative for vision changes or eye problems  Severity:   mild  Duration:     Timing:   gradual onset  Other:       Do you use any eye drops? not applicable  For what condition(s)?    Ocular Meds:  None       ROS:    Review Of Systems  Eyes: negative  Endocrine:  negative    Allergies:    Allergies   Allergen Reactions     Acrylate Copolymer Rash     topical       Systemic Medications:   Current Outpatient Prescriptions   Medication     amLODIPine (NORVASC) 5 MG tablet     metoprolol (TOPROL-XL) 25 MG 24 hr tablet     fluticasone (FLONASE) 50 MCG/ACT spray     lisinopril (PRINIVIL/ZESTRIL) 40 MG tablet     omeprazole (PRILOSEC) 20 MG CR capsule     levothyroxine (SYNTHROID/LEVOTHROID) 88 MCG tablet     lovastatin (MEVACOR) 20 MG tablet     indomethacin (INDOCIN) 50 MG capsule     acetaminophen (TYLENOL) 500 MG tablet     VITAMIN D, CHOLECALCIFEROL, PO     Cyanocobalamin 1000 MCG TABS     ASPIRIN 81 MG OR TABS     No current facility-administered medications for this visit.        Family History   Problem Relation Age of Onset     CANCER Father      , unk type     Myocardial Infarction Sister      Thyroid Disease Sister      DIABETES No family hx of      Hypertension No family hx of      CEREBROVASCULAR DISEASE No family hx of      Glaucoma No family hx of      Macular Degeneration No family hx  of        Social History   Substance Use Topics     Smoking status: Never Smoker     Smokeless tobacco: Never Used     Alcohol use Beatriz THOMASON

## 2017-04-26 ENCOUNTER — OFFICE VISIT (OUTPATIENT)
Dept: OPHTHALMOLOGY | Facility: CLINIC | Age: 82
End: 2017-04-26
Attending: DERMATOLOGY
Payer: COMMERCIAL

## 2017-04-26 DIAGNOSIS — H02.421 MYOGENIC PTOSIS, RIGHT: ICD-10-CM

## 2017-04-26 DIAGNOSIS — H02.422 MYOGENIC PTOSIS, LEFT: ICD-10-CM

## 2017-04-26 DIAGNOSIS — H02.834 DERMATOCHALASIS OF BOTH UPPER EYELIDS: ICD-10-CM

## 2017-04-26 DIAGNOSIS — D23.9 APOCRINE HIDROCYSTOMA: Primary | ICD-10-CM

## 2017-04-26 DIAGNOSIS — H02.831 DERMATOCHALASIS OF BOTH UPPER EYELIDS: ICD-10-CM

## 2017-04-26 PROCEDURE — 99213 OFFICE O/P EST LOW 20 MIN: CPT | Performed by: OPHTHALMOLOGY

## 2017-04-26 ASSESSMENT — VISUAL ACUITY
METHOD: SNELLEN - LINEAR
OS_SC+: -2
OD_SC: 20/25
OS_SC: 20/30
OD_SC+: -2

## 2017-04-26 ASSESSMENT — TONOMETRY
IOP_METHOD: ICARE
OS_IOP_MMHG: 14
OD_IOP_MMHG: 16

## 2017-04-26 ASSESSMENT — EXTERNAL EXAM - LEFT EYE: OS_EXAM: NORMAL

## 2017-04-26 ASSESSMENT — EXTERNAL EXAM - RIGHT EYE: OD_EXAM: NORMAL

## 2017-04-26 NOTE — MR AVS SNAPSHOT
After Visit Summary   4/26/2017    James Lama    MRN: [id number]           Patient Information     Date Of Birth          6/28/1927        Visit Information        Provider Department      4/26/2017 11:00 AM Maulik Winslow MD [facility]        Today's Diagnoses     Apocrine hidrocystoma    -  1    Dermatochalasis of both upper eyelids        Myogenic ptosis, right        Myogenic ptosis, left           Follow-ups after your visit        Who to contact     If you have questions or need follow up information about today's clinic visit or your schedule please contact [facility] directly at [phone].  Normal or non-critical lab and imaging results will be communicated to you by MyChart, letter or phone within 4 business days after the clinic has received the results. If you do not hear from us within 7 days, please contact the clinic through MyChart or phone. If you have a critical or abnormal lab result, we will notify you by phone as soon as possible.  Submit refill requests through [employer] or call your pharmacy and they will forward the refill request to us. Please allow 3 business days for your refill to be completed.          Additional Information About Your Visit        Care EveryWhere ID     This is your Care EveryWhere ID. This could be used by other organizations to access your [facility] medical records  [id number]         Blood Pressure from Last 3 Encounters:   04/06/17 141/65   03/02/17 132/58   02/16/17 144/69    Weight from Last 3 Encounters:   04/06/17 99.3 kg (219 lb)   03/02/17 97.5 kg (215 lb)   02/16/17 98.4 kg (217 lb)              Today, you had the following     No orders found for display       Primary Care Provider Office Phone # Fax #    Juan Carlos Jansen [phone]       [facility] [address]  [facility] [address]        Thank you!     Thank you for choosing [facility]  for  your care. Our goal is always to provide you with excellent care. Hearing back from our patients is one way we can continue to improve our services. Please take a few minutes to complete the written survey that you may receive in the mail after your visit with us. Thank you!             Your Updated Medication List - Protect others around you: Learn how to safely use, store and throw away your medicines at www.disposemymeds.org.          This list is accurate as of: 4/26/17 12:47 PM.  Always use your most recent med list.                   Brand Name Dispense Instructions for use    amLODIPine 5 MG tablet    NORVASC    90 tablet    Take 1 tablet (5 mg) by mouth daily for blood pressure.       aspirin 81 MG tablet      1 tablet daily*       cyanocobalamin 1000 MCG Tabs      1 tablet daily. for foot neuropathy.       fluticasone 50 MCG/ACT spray    FLONASE    1 Bottle    Use 1-2 sprays each nostril daily for allergy prevention.       indomethacin 50 MG capsule    INDOCIN    40 capsule    Take 1 capsule (50 mg) by mouth 3 times daily (with meals) as needed for gouty attack.       levothyroxine 88 MCG tablet    SYNTHROID/LEVOTHROID    90 tablet    Take 1 tablet (88 mcg) by mouth daily for thyroid.       lisinopril 40 MG tablet    PRINIVIL/ZESTRIL    90 tablet    Take 1 tablet (40 mg) by mouth daily for blood pressure.       lovastatin 20 MG tablet    MEVACOR    90 tablet    TAKE ONE TABLET BY MOUTH IN THE EVENING FOR CHOLESTEROL       metoprolol 25 MG 24 hr tablet    TOPROL-XL    90 tablet    Take 1 tablet (25 mg) by mouth daily for blood pressure.       omeprazole 20 MG CR capsule    priLOSEC    180 capsule    Take 2 capsules (40 mg) by mouth daily for reflux.       PRESERVISION AREDS PO      Take 2 capsules by mouth daily       TYLENOL 500 MG tablet   Generic drug:  acetaminophen      Take 2 tablets by mouth 2 times daily as needed for pain (knee).       VITAMIN D (CHOLECALCIFEROL) PO      Take  by mouth daily.

## 2017-04-26 NOTE — NURSING NOTE
Patient presents with:  Lesion On Left Upper Lid  Lesion On Left Lower Lid      Referring Provider:  Catia Loredo MD  [facility] DERMATOLOGY  [address]  [place] [address]    HPI    Affected eye(s):  Both   Location:  Upper, Lower   Symptoms:     No redness   No tearing   No Dryness   No itching   No burning         Do you have eye pain now?:  No      Comments:     Lesion On Left Upper Lid  Lesion On Left Lower Lid

## 2017-04-26 NOTE — LETTER
2017         RE:  :  MRN: James Lama  [id number]  [id number]     Dear Dr. Loredo,    Thank you for allowing me to see your patient, James Lama, for an oculoplastic   consultation.  My assessment and plan are below.  For further details, please see my attached clinic note.      James Lama is an 89 year old male referred by Dr. Loredo for lesions of the left upper lid and left lower lid. He is uncertain how long it has been present, at least 6-7 months. No pain. No bleeding. He does have history of Basal cell carcinoma. No change in vision.      Assessment & Plan     James Lama is a 89 year old male with the following diagnoses:   1. Apocrine hidrocystoma    2. Dermatochalasis of both upper eyelids    3. Myogenic ptosis, right    4. Myogenic ptosis, left       Clinically left upper lid x2 and left lower lid lesion all appear consistent with apocrine hidrocystoma. His upper lids are quiet droopy and do obstruct his vision, though he does not feel they do.    Options:  1. Photograph and recheck lesion in 1 year  2. Biopsy of lesions  3. BULB, ptosis repair and biopsy of lesions all in one setting.     He would like to photograph and return in one year. He will return sooner if there is any change. Follows with Dr. Ewing and althea for complete eye exam in May.         Again, thank you for allowing me to participate in the care of your patient.      Sincerely,    Maulik Winslow MD  Department of Ophthalmology and Visual Neurosciences  [facility]    CC: Catia Loredo MD  [facility]at[facility]  [address] [facility] [address]  [facility] [address]  VIA In Basket     Brandy Ewing, KIRSTIN   [facility]  [address]  [facility] [address]  VIA In Basket

## 2017-04-26 NOTE — PROGRESS NOTES
Chief Complaints and History of Present Illnesses   Patient presents with     Lesion On Left Upper Lid     Lesion On Left Lower Lid     James Lama is an 89 year old male referred by Dr. Loredo for lesions of the left upper lid and left lower lid. He is uncertain how long it has been present, at least 6-7 months. No pain. No bleeding. He does have history of Basal cell carcinoma. No change in vision.         Assessment & Plan     James Lama is a 89 year old male with the following diagnoses:   1. Apocrine hidrocystoma    2. Dermatochalasis of both upper eyelids    3. Myogenic ptosis, right    4. Myogenic ptosis, left       Clinically left upper lid x2 and left lower lid lesion all appear consistent with apocrine hidrocystoma. His upper lids are quiet droopy and do obstruct his vision, though he does not feel they do.    Options:  1. Photograph and recheck lesion in 1 year  2. Biopsy of lesions  3. BULB, ptosis repair and biopsy of lesions all in one setting.     He would like to photograph and return in one year. He will return sooner if there is any change. Follows with Dr. Ewing and due for complete eye exam in May.          Complete documentation of historical and exam elements from today's encounter can be found in the full encounter summary report (not reduplicated in this progress note). I personally obtained the chief complaint(s) and history of present illness.  I confirmed and edited as necessary the review of systems, past medical/surgical history, family history, social history, and examination findings as documented by others; and I examined the patient myself. I personally reviewed the relevant tests, images, and reports as documented above. I formulated and edited as necessary the assessment and plan and discussed the findings and management plan with the patient and family. - Maulik Winslow MD

## 2017-05-11 ENCOUNTER — TRANSFERRED RECORDS (OUTPATIENT)
Dept: HEALTH INFORMATION MANAGEMENT | Facility: CLINIC | Age: 82
End: 2017-05-11

## 2017-05-25 ENCOUNTER — MEDICAL CORRESPONDENCE (OUTPATIENT)
Dept: HEALTH INFORMATION MANAGEMENT | Facility: CLINIC | Age: 82
End: 2017-05-25

## 2017-06-12 DIAGNOSIS — E78.5 HYPERLIPIDEMIA LDL GOAL <130: ICD-10-CM

## 2017-06-13 NOTE — TELEPHONE ENCOUNTER
lovastatin (MEVACOR) 20 MG tablet     Last Written Prescription Date: 12/9/16  Last Fill Quantity: 90, # refills: 1  Last Office Visit with G, [facility] or [facility] prescribing provider: 4/6/17       Lab Results   Component Value Date    CHOL 156 01/27/2017     Lab Results   Component Value Date    HDL 46 01/27/2017     Lab Results   Component Value Date    LDL 80 01/27/2017     Lab Results   Component Value Date    TRIG 151 01/27/2017     Lab Results   Component Value Date    CHOLHDLRATIO 3.9 04/10/2015         Steph Garcia  [facility]

## 2017-06-15 ENCOUNTER — NURSE TRIAGE (OUTPATIENT)
Dept: NURSING | Facility: CLINIC | Age: 82
End: 2017-06-15

## 2017-06-15 ENCOUNTER — TELEPHONE (OUTPATIENT)
Dept: NURSING | Facility: CLINIC | Age: 82
End: 2017-06-15

## 2017-06-15 RX ORDER — LOVASTATIN 20 MG
TABLET ORAL
Qty: 90 TABLET | Refills: 1 | Status: SHIPPED | OUTPATIENT
Start: 2017-06-15 | End: 2017-09-01

## 2017-06-15 NOTE — TELEPHONE ENCOUNTER
Please call there is confusion on what he should be taking for blood pressure. Epic message sent high priority.

## 2017-06-15 NOTE — TELEPHONE ENCOUNTER
Please call there is confusion on what he should be taking for blood pressure medications. He's supposed to go down on some doses and start a new one and he's not sure about any of it.  Alexia Cunningham RN-[facility]

## 2017-06-15 NOTE — TELEPHONE ENCOUNTER
Prescription approved per [facility] Refill Protocol.    Susy Turner RN, [facility]

## 2017-06-15 NOTE — TELEPHONE ENCOUNTER
Patient contacted. He picked up 10 mg tablets of Norvasc. I advised patient goal is 5 mg once daily of Norvasc. Patient reports he will take medication back to pharmacy and ask for 5 mg tablets (should have refill on file). If unable to exchange, he will ask for a pill cutter. Patient verbalized understanding. Medication list reviewed with patient. No further needs at this time.     Cyndi Beyer RN

## 2017-06-22 ENCOUNTER — TRANSFERRED RECORDS (OUTPATIENT)
Dept: HEALTH INFORMATION MANAGEMENT | Facility: CLINIC | Age: 82
End: 2017-06-22

## 2017-08-30 ENCOUNTER — TELEPHONE (OUTPATIENT)
Dept: FAMILY MEDICINE | Facility: CLINIC | Age: 82
End: 2017-08-30

## 2017-08-30 DIAGNOSIS — N18.30 BENIGN HYPERTENSION WITH CHRONIC KIDNEY DISEASE, STAGE III (H): ICD-10-CM

## 2017-08-30 DIAGNOSIS — I12.9 BENIGN HYPERTENSION WITH CHRONIC KIDNEY DISEASE, STAGE III (H): ICD-10-CM

## 2017-08-30 RX ORDER — METOPROLOL SUCCINATE 25 MG/1
TABLET, EXTENDED RELEASE ORAL
Qty: 90 TABLET | Refills: 0 | Status: SHIPPED | OUTPATIENT
Start: 2017-08-30 | End: 2017-09-01

## 2017-08-30 NOTE — TELEPHONE ENCOUNTER
metoprolol (TOPROL-XL) 25 MG 24 hr tablet      Last Written Prescription Date: 02/16/17  Last Fill Quantity: 90, # refills: 1    Last Office Visit with [facility]IS[facility], [facility] or [facility] prescribing provider:  04/06/17   Future Office Visit:        BP Readings from Last 3 Encounters:   04/06/17 141/65   03/02/17 132/58   02/16/17 144/69         Mary Shi  [facility]

## 2017-08-30 NOTE — TELEPHONE ENCOUNTER
Prescription approved per [facility] Refill Protocol.    Per 3/2/17 OV note, patient was to have a hypertension visit 7/2017. Nothing scheduled as yet.     Routing to [place] to facilitate office visit appointment.

## 2017-09-01 ENCOUNTER — OFFICE VISIT (OUTPATIENT)
Dept: FAMILY MEDICINE | Facility: CLINIC | Age: 82
End: 2017-09-01
Payer: COMMERCIAL

## 2017-09-01 VITALS
HEART RATE: 70 BPM | SYSTOLIC BLOOD PRESSURE: 136 MMHG | DIASTOLIC BLOOD PRESSURE: 70 MMHG | WEIGHT: 215 LBS | TEMPERATURE: 97.6 F | OXYGEN SATURATION: 96 % | BODY MASS INDEX: 31.98 KG/M2

## 2017-09-01 DIAGNOSIS — I12.9 BENIGN HYPERTENSION WITH CHRONIC KIDNEY DISEASE, STAGE III (H): Primary | ICD-10-CM

## 2017-09-01 DIAGNOSIS — N18.30 BENIGN HYPERTENSION WITH CHRONIC KIDNEY DISEASE, STAGE III (H): Primary | ICD-10-CM

## 2017-09-01 DIAGNOSIS — E78.5 HYPERLIPIDEMIA LDL GOAL <130: ICD-10-CM

## 2017-09-01 DIAGNOSIS — Z23 NEED FOR INFLUENZA VACCINATION: ICD-10-CM

## 2017-09-01 DIAGNOSIS — E03.4 HYPOTHYROIDISM DUE TO ACQUIRED ATROPHY OF THYROID: ICD-10-CM

## 2017-09-01 LAB
ALT SERPL W P-5'-P-CCNC: 19 U/L (ref 0–70)
CHOLEST SERPL-MCNC: 165 MG/DL
CREAT SERPL-MCNC: 1.25 MG/DL (ref 0.66–1.25)
GFR SERPL CREATININE-BSD FRML MDRD: 54 ML/MIN/1.7M2
HDLC SERPL-MCNC: 41 MG/DL
LDLC SERPL CALC-MCNC: 73 MG/DL
NONHDLC SERPL-MCNC: 124 MG/DL
POTASSIUM SERPL-SCNC: 4.4 MMOL/L (ref 3.4–5.3)
TRIGL SERPL-MCNC: 257 MG/DL
TSH SERPL DL<=0.005 MIU/L-ACNC: 2.74 MU/L (ref 0.4–4)

## 2017-09-01 PROCEDURE — 84443 ASSAY THYROID STIM HORMONE: CPT | Performed by: FAMILY MEDICINE

## 2017-09-01 PROCEDURE — G0008 ADMIN INFLUENZA VIRUS VAC: HCPCS | Performed by: FAMILY MEDICINE

## 2017-09-01 PROCEDURE — 99213 OFFICE O/P EST LOW 20 MIN: CPT | Mod: 25 | Performed by: FAMILY MEDICINE

## 2017-09-01 PROCEDURE — 84132 ASSAY OF SERUM POTASSIUM: CPT | Performed by: FAMILY MEDICINE

## 2017-09-01 PROCEDURE — 84460 ALANINE AMINO (ALT) (SGPT): CPT | Performed by: FAMILY MEDICINE

## 2017-09-01 PROCEDURE — 80061 LIPID PANEL: CPT | Performed by: FAMILY MEDICINE

## 2017-09-01 PROCEDURE — 90662 IIV NO PRSV INCREASED AG IM: CPT | Performed by: FAMILY MEDICINE

## 2017-09-01 PROCEDURE — 82565 ASSAY OF CREATININE: CPT | Performed by: FAMILY MEDICINE

## 2017-09-01 PROCEDURE — 36415 COLL VENOUS BLD VENIPUNCTURE: CPT | Performed by: FAMILY MEDICINE

## 2017-09-01 RX ORDER — LISINOPRIL 40 MG/1
40 TABLET ORAL DAILY
Qty: 90 TABLET | Refills: 1 | Status: SHIPPED | OUTPATIENT
Start: 2017-09-01 | End: 2018-03-08

## 2017-09-01 RX ORDER — METOPROLOL SUCCINATE 25 MG/1
TABLET, EXTENDED RELEASE ORAL
Qty: 90 TABLET | Refills: 1 | Status: SHIPPED | OUTPATIENT
Start: 2017-09-01 | End: 2018-03-08

## 2017-09-01 RX ORDER — LOVASTATIN 20 MG
20 TABLET ORAL EVERY EVENING
Qty: 90 TABLET | Refills: 1 | Status: SHIPPED | OUTPATIENT
Start: 2017-09-01 | End: 2018-03-08

## 2017-09-01 RX ORDER — LEVOTHYROXINE SODIUM 88 UG/1
88 TABLET ORAL DAILY
Qty: 90 TABLET | Refills: 1 | Status: SHIPPED | OUTPATIENT
Start: 2017-09-01 | End: 2018-03-08

## 2017-09-01 NOTE — NURSING NOTE
"Chief Complaint   Patient presents with     Hypertension     Follow up. Pt is not fasting.       Initial /70 (BP Location: Right arm, Patient Position: Chair, Cuff Size: Adult Large)  Pulse 70  Temp 97.6  F (36.4  C) (Oral)  Wt 215 lb (97.5 kg)  SpO2 96%  BMI 31.98 kg/m2 Estimated body mass index is 31.98 kg/(m^2) as calculated from the following:    Height as of 4/6/17: 5' 8.75\" (1.746 m).    Weight as of this encounter: 215 lb (97.5 kg).  Medication Reconciliation: complete   An,CMA (AMAA)        "

## 2017-09-01 NOTE — MR AVS SNAPSHOT
"              After Visit Summary   9/1/2017    James Lama    MRN: [id number]           Patient Information     Date Of Birth          6/28/1927        Visit Information        Provider Department      9/1/2017 10:00 AM Juan Carlos Jansen MD [facility]        Today's Diagnoses     Benign hypertension with chronic kidney disease, stage III    -  1    Hypothyroidism due to acquired atrophy of thyroid        Hyperlipidemia LDL goal <130        Need for influenza vaccination           Follow-ups after your visit        Follow-up notes from your care team     Return in about 5 months (around 1/27/2018) for full physical, blood pressure check, lab tests.      Who to contact     If you have questions or need follow up information about today's clinic visit or your schedule please contact [facility] directly at [phone].  Normal or non-critical lab and imaging results will be communicated to you by MyChart, letter or phone within 4 business days after the clinic has received the results. If you do not hear from us within 7 days, please contact the clinic through MyChart or phone. If you have a critical or abnormal lab result, we will notify you by phone as soon as possible.  Submit refill requests through [employer] or call your pharmacy and they will forward the refill request to us. Please allow 3 business days for your refill to be completed.          Additional Information About Your Visit        MyChart Information     [employer] lets you send messages to your doctor, view your test results, renew your prescriptions, schedule appointments and more. To sign up, go to [email or web address] . Click on \"Log in\" on the left side of the screen, which will take you to the Welcome page. Then click on \"Sign up Now\" on the right side of the page.     You will be asked to enter the access code listed below, as well as some personal information. Please follow the directions to create " your username and password.     Your access code is: [email or web address]  Expires: 2017 11:22 AM     Your access code will  in 90 days. If you need help or a new code, please call your [facility] clinic or [phone].        Care EveryWhere ID     This is your Care EveryWhere ID. This could be used by other organizations to access your [facility] medical records  [id number]        Your Vitals Were     Pulse Temperature Pulse Oximetry BMI (Body Mass Index)          70 97.6  F (36.4  C) (Oral) 96% 31.98 kg/m2         Blood Pressure from Last 3 Encounters:   17 136/70   17 141/65   17 132/58    Weight from Last 3 Encounters:   17 215 lb (97.5 kg)   17 219 lb (99.3 kg)   17 215 lb (97.5 kg)              We Performed the Following     ALT     Creatinine     HC FLU VACCINE, INCREASED ANTIGEN, PRESV FREE     Lipid panel reflex to direct LDL     Potassium     TSH with free T4 reflex          Today's Medication Changes          These changes are accurate as of: 17 11:26 AM.  If you have any questions, ask your nurse or doctor.               These medicines have changed or have updated prescriptions.        Dose/Directions    lovastatin 20 MG tablet   Commonly known as:  MEVACOR   This may have changed:    - how much to take  - how to take this  - when to take this  - additional instructions   Used for:  Hyperlipidemia LDL goal <130   Changed by:  Juan Carlos Jansen MD        Dose:  20 mg   Take 1 tablet (20 mg) by mouth every evening for cholesterol.   Quantity:  90 tablet   Refills:  1       metoprolol 25 MG 24 hr tablet   Commonly known as:  TOPROL-XL   This may have changed:  See the new instructions.   Used for:  Benign hypertension with chronic kidney disease, stage III   Changed by:  Juan Carlos Jansen MD        Take 1 tablet daily for blood pressure.   Quantity:  90 tablet   Refills:  1            Where to get your medicines      These medications were sent to [facility] - [place], [place] - [address]  [address] [facility] [address]     Phone:  [phone]     levothyroxine 88 MCG tablet    lisinopril 40 MG tablet    lovastatin 20 MG tablet    metoprolol 25 MG 24 hr tablet                Primary Care Provider Office Phone # Fax #    Juan Carlos Jansen [phone]       [address]  [facility] [address]        Equal Access to Services     [facility] Center[facility] : Hadii aad ku hadasho Soomaali, waaxda luqadaha, qaybta kaalmada adeegyada, waxay idiin hayaan adeeg kharash la'aditi . So [facility] [phone].    ATENCIÓN: Si habla español, tiene a londono disposición servicios gratuitos de asistencia lingüística. [facility] [phone].    We comply with applicable federal civil rights laws and [place] laws. We do not discriminate on the basis of race, color, national origin, age, disability sex, sexual orientation or gender identity.            Thank you!     Thank you for choosing [facility]  for your care. Our goal is always to provide you with excellent care. Hearing back from our patients is one way we can continue to improve our services. Please take a few minutes to complete the written survey that you may receive in the mail after your visit with us. Thank you!             Your Updated Medication List - Protect others around you: Learn how to safely use, store and throw away your medicines at www.disposemymeds.org.          This list is accurate as of: 9/1/17 11:26 AM.  Always use your most recent med list.                   Brand Name Dispense Instructions for use Diagnosis    amLODIPine 5 MG tablet    NORVASC    90 tablet    Take 1 tablet (5 mg) by mouth daily for blood pressure.    Benign hypertension with chronic kidney disease, stage III       aspirin 81 MG tablet      1 tablet daily*        cyanocobalamin 1000 MCG Tabs      1 tablet daily for foot neuropathy (vitamin B12).        fluticasone 50 MCG/ACT spray     FLONASE    1 Bottle    Use 1-2 sprays each nostril daily for allergy prevention.    Perennial allergic rhinitis, unspecified allergic rhinitis trigger       indomethacin 50 MG capsule    INDOCIN    40 capsule    Take 1 capsule (50 mg) by mouth 3 times daily (with meals) as needed for gouty attack.    Gout       levothyroxine 88 MCG tablet    SYNTHROID/LEVOTHROID    90 tablet    Take 1 tablet (88 mcg) by mouth daily for thyroid.    Hypothyroidism due to acquired atrophy of thyroid       lisinopril 40 MG tablet    PRINIVIL/ZESTRIL    90 tablet    Take 1 tablet (40 mg) by mouth daily for blood pressure.    Benign hypertension with chronic kidney disease, stage III       lovastatin 20 MG tablet    MEVACOR    90 tablet    Take 1 tablet (20 mg) by mouth every evening for cholesterol.    Hyperlipidemia LDL goal <130       metoprolol 25 MG 24 hr tablet    TOPROL-XL    90 tablet    Take 1 tablet daily for blood pressure.    Benign hypertension with chronic kidney disease, stage III       omeprazole 20 MG CR capsule    priLOSEC    180 capsule    Take 2 capsules (40 mg) by mouth daily for reflux.    Gastroesophageal reflux disease without esophagitis       PRESERVISION AREDS PO      Take 1 capsule by mouth 2 times daily (with meals) (PreserVision AREDS 2)        TYLENOL 500 MG tablet   Generic drug:  acetaminophen      Take 2 tablets by mouth 2 times daily as needed for pain (knee).        VITAMIN D (CHOLECALCIFEROL) PO      Take 1,000 Units by mouth daily

## 2017-09-01 NOTE — LETTER
September 5, 2017      James Lama  [address]  [place] [address]            Dear James Lama    All of your labs were normal for you.      Enclosed is a copy of the results.  It was a pleasure to see you at your last appointment.    If you have any questions or concerns, please call myself or my nurse at [phone].      Sincerely,      Juan Carlos Jansen MD/SILKE Gutierrez MA      Results for orders placed or performed in visit on 09/01/17   Lipid panel reflex to direct LDL   Result Value Ref Range    Cholesterol 165 <200 mg/dL    Triglycerides 257 (H) <150 mg/dL    HDL Cholesterol 41 >39 mg/dL    LDL Cholesterol Calculated 73 <100 mg/dL    Non HDL Cholesterol 124 <130 mg/dL   ALT   Result Value Ref Range    ALT 19 0 - 70 U/L   Creatinine   Result Value Ref Range    Creatinine 1.25 0.66 - 1.25 mg/dL    GFR Estimate 54 (L) >60 mL/min/1.7m2    GFR Estimate If Black 66 >60 mL/min/1.7m2   Potassium   Result Value Ref Range    Potassium 4.4 3.4 - 5.3 mmol/L   TSH with free T4 reflex   Result Value Ref Range    TSH 2.74 0.40 - 4.00 mU/L

## 2017-09-01 NOTE — NURSING NOTE
Injectable Influenza Immunization Documentation    1.  Is the person to be vaccinated sick today? No    2.  Does the person to be vaccinated have an allergy to eggs or to a component of the vaccine? No    3.  Has the person to be vaccinated ever had a serious reaction to influenza vaccine in the past? No    4.  Has the person to be vaccinated ever had Guillain-Honor syndrome? No    Vaccination given by Anatoliy Gutierrez MA

## 2017-09-01 NOTE — PROGRESS NOTES
SUBJECTIVE:   James Lama is a 89 year old male who presents to clinic today for the following health issues:  He is accompanied by his wife.     Hypertension Follow-up      Outpatient blood pressures are not being checked.    Low Salt Diet: not monitoring salt    Amount of exercise or physical activity: None    Problems taking medications regularly: No    Medication side effects: none    He is taking 5 mg amlodipine daily.  Diet: regular (no restrictions)    Past medical, family, and social histories, medications, and allergies are reviewed and updated in Epic.     ROS:  C: NEGATIVE for fever, chills, change in weight  E/M: NEGATIVE for ear, mouth and throat problems  R: NEGATIVE for significant cough or SOB  CV: NEGATIVE for chest pain, palpitations or peripheral edema  ROS otherwise negative    Any history above obtained by the Medical Assistant was reviewed by Dr. Juan Carlos Jansen MD, and edited when necessary.    This document serves as a record of the services and decisions personally performed and made by Dr. Jansen. It was created on his behalf by Romana Roberto, a trained medical scribe. The creation of this document is based the provider's statements to the medical scribe.  Romana Roberto September 1, 2017 10:55 AM     OBJECTIVE:                                                    /70 (BP Location: Right arm, Patient Position: Chair, Cuff Size: Adult Large)  Pulse 70  Temp 97.6  F (36.4  C) (Oral)  Wt 97.5 kg (215 lb)  SpO2 96%  BMI 31.98 kg/m2   Body mass index is 31.98 kg/(m^2).     GENERAL: healthy, alert and no distress  EYES: Eyes grossly normal to inspection, PERRL, EOMI, sclerae white and conjunctivae normal  RESP: lungs clear to auscultation - no crackles or wheezes, no areas of dullness, no tachypnea  CV: Heart regular rate and rhythm without murmur, click or rub. No peripheral edema and peripheral pulses strong  MS: no gross musculoskeletal defects noted, no edema  SKIN: no  suspicious lesions or rashes  NEURO: Normal strength and tone, sensory exam grossly normal, mentation intact, oriented times 3 and cranial nerves 2-12 intact  PSYCH: mentation appears normal, affect normal/bright        ASSESSMENT/PLAN:                                                      (I12.9,  N18.3) Benign hypertension with chronic kidney disease, stage III  (primary encounter diagnosis)  Comment: Controlled.  Plan: lisinopril (PRINIVIL/ZESTRIL) 40 MG tablet,         metoprolol (TOPROL-XL) 25 MG 24 hr tablet,         Creatinine, Potassium        Follow up in late January at [facility].    (E03.4) Hypothyroidism due to acquired atrophy of thyroid  Comment: Historically not euthyroid, but free T4 was normal.  Plan: levothyroxine (SYNTHROID/LEVOTHROID) 88 MCG         tablet, TSH with free T4 reflex        Adjust levothyroxine to 100 mcg if TSH is out of range again. Follow up in late January at [facility].    (E78.5) Hyperlipidemia LDL goal <130  Comment: historically at goal. Non-fasting.  Plan: lovastatin (MEVACOR) 20 MG tablet, Lipid panel         reflex to direct LDL, ALT        Follow up in late January at [facility].    (Z23) Need for influenza vaccination  Comment: Influenza vaccine offered and accepted by patient. He has received it before without problems.   Plan: HC FLU VACCINE, INCREASED ANTIGEN, PRESV FREE            The information in this document, created by the medical scribe for me, accurately reflects the services I personally performed and the decisions made by me. I have reviewed and approved this document for accuracy prior to leaving the patient care area. September 1, 2017 10:55 AM     Juan Carlos Jansen MD

## 2017-10-03 ENCOUNTER — OFFICE VISIT (OUTPATIENT)
Dept: FAMILY MEDICINE | Facility: CLINIC | Age: 82
End: 2017-10-03
Payer: COMMERCIAL

## 2017-10-03 VITALS
TEMPERATURE: 98.7 F | SYSTOLIC BLOOD PRESSURE: 122 MMHG | HEIGHT: 69 IN | BODY MASS INDEX: 31.55 KG/M2 | HEART RATE: 52 BPM | DIASTOLIC BLOOD PRESSURE: 63 MMHG | WEIGHT: 213 LBS | OXYGEN SATURATION: 99 %

## 2017-10-03 DIAGNOSIS — L02.223 FURUNCLE OF CHEST WALL: Primary | ICD-10-CM

## 2017-10-03 PROCEDURE — 99213 OFFICE O/P EST LOW 20 MIN: CPT | Performed by: FAMILY MEDICINE

## 2017-10-03 RX ORDER — SULFAMETHOXAZOLE/TRIMETHOPRIM 800-160 MG
1 TABLET ORAL 2 TIMES DAILY
Qty: 14 TABLET | Refills: 0 | Status: SHIPPED | OUTPATIENT
Start: 2017-10-03 | End: 2017-10-10

## 2017-10-03 ASSESSMENT — PAIN SCALES - GENERAL: PAINLEVEL: NO PAIN (0)

## 2017-10-03 NOTE — NURSING NOTE
"Chief Complaint   Patient presents with     Derm Problem       Initial /63 (BP Location: Right arm, Patient Position: Chair, Cuff Size: Adult Large)  Pulse 52  Temp 98.7  F (37.1  C) (Oral)  Ht 5' 8.75\" (1.746 m)  Wt 213 lb (96.6 kg)  SpO2 99%  BMI 31.68 kg/m2 Estimated body mass index is 31.68 kg/(m^2) as calculated from the following:    Height as of this encounter: 5' 8.75\" (1.746 m).    Weight as of this encounter: 213 lb (96.6 kg).  Medication Reconciliation: complete     Matt Hilliard CMA    "

## 2017-10-03 NOTE — MR AVS SNAPSHOT
"              After Visit Summary   10/3/2017    James Lama    MRN: [id number]           Patient Information     Date Of Birth          1927        Visit Information        Provider Department      10/3/2017 11:40 AM Juan Carlos Jansen MD [facility]        Today's Diagnoses     Furuncle of chest wall    -  1       Follow-ups after your visit        Follow-up notes from your care team     Return in about 10 days (around 10/13/2017) for recheck if symptoms fail to resolve by then.      Who to contact     If you have questions or need follow up information about today's clinic visit or your schedule please contact [facility] directly at [phone].  Normal or non-critical lab and imaging results will be communicated to you by MyChart, letter or phone within 4 business days after the clinic has received the results. If you do not hear from us within 7 days, please contact the clinic through [employer] or phone. If you have a critical or abnormal lab result, we will notify you by phone as soon as possible.  Submit refill requests through [employer] or call your pharmacy and they will forward the refill request to us. Please allow 3 business days for your refill to be completed.          Additional Information About Your Visit        MyChart Information     [employer] lets you send messages to your doctor, view your test results, renew your prescriptions, schedule appointments and more. To sign up, go to [email or web address] . Click on \"Log in\" on the left side of the screen, which will take you to the Welcome page. Then click on \"Sign up Now\" on the right side of the page.     You will be asked to enter the access code listed below, as well as some personal information. Please follow the directions to create your username and password.     Your access code is: [email or web address]  Expires: 2017 11:22 AM     Your access code will  in 90 days. If you need help or a new " "code, please call your [facility] clinic or [phone].        Care EveryWhere ID     This is your Care EveryWhere ID. This could be used by other organizations to access your [facility] medical records  [id number]        Your Vitals Were     Pulse Temperature Height Pulse Oximetry BMI (Body Mass Index)       52 98.7  F (37.1  C) (Oral) 1.746 m (5' 8.75\") 99% 31.68 kg/m2        Blood Pressure from Last 3 Encounters:   10/03/17 122/63   09/01/17 136/70   04/06/17 141/65    Weight from Last 3 Encounters:   10/03/17 96.6 kg (213 lb)   09/01/17 97.5 kg (215 lb)   04/06/17 99.3 kg (219 lb)              We Performed the Following     Skin Culture Aerobic Bacterial          Today's Medication Changes          These changes are accurate as of: 10/3/17 12:24 PM.  If you have any questions, ask your nurse or doctor.               Start taking these medicines.        Dose/Directions    sulfamethoxazole-trimethoprim 800-160 MG per tablet   Commonly known as:  BACTRIM DS/SEPTRA DS   Used for:  Furuncle of chest wall   Started by:  Juan Carlos Jansen MD        Dose:  1 tablet   Take 1 tablet by mouth 2 times daily for 7 days for skin infection.   Quantity:  14 tablet   Refills:  0            Where to get your medicines      These medications were sent to [facility] [address] [place] [address]     Phone:  [phone]     sulfamethoxazole-trimethoprim 800-160 MG per tablet                Primary Care Provider Office Phone # Fax #    Juan Carlos Jansen [phone]       [address]  [facility] [address]        Equal Access to Services     ARLIN RAZO AH: Chilo uGajardo, waasuncionda luqadaha, qaybta kaalmada adeegyada, danae jones. So [facility] [phone].    ATENCIÓN: Si habla español, tiene a londono disposición servicios gratuitos de asistencia lingüística. Llame al [phone].    We comply with " applicable federal civil rights laws and [place] laws. We do not discriminate on the basis of race, color, national origin, age, disability, sex, sexual orientation, or gender identity.            Thank you!     Thank you for choosing [facility]  for your care. Our goal is always to provide you with excellent care. Hearing back from our patients is one way we can continue to improve our services. Please take a few minutes to complete the written survey that you may receive in the mail after your visit with us. Thank you!             Your Updated Medication List - Protect others around you: Learn how to safely use, store and throw away your medicines at www.disposemymeds.org.          This list is accurate as of: 10/3/17 12:24 PM.  Always use your most recent med list.                   Brand Name Dispense Instructions for use Diagnosis    amLODIPine 5 MG tablet    NORVASC    90 tablet    Take 1 tablet (5 mg) by mouth daily for blood pressure.    Benign hypertension with chronic kidney disease, stage III       aspirin 81 MG tablet      1 tablet daily*        cyanocobalamin 1000 MCG Tabs      1 tablet daily for foot neuropathy (vitamin B12).        fluticasone 50 MCG/ACT spray    FLONASE    1 Bottle    Use 1-2 sprays each nostril daily for allergy prevention.    Perennial allergic rhinitis, unspecified allergic rhinitis trigger       indomethacin 50 MG capsule    INDOCIN    40 capsule    Take 1 capsule (50 mg) by mouth 3 times daily (with meals) as needed for gouty attack.    Gout       levothyroxine 88 MCG tablet    SYNTHROID/LEVOTHROID    90 tablet    Take 1 tablet (88 mcg) by mouth daily for thyroid.    Hypothyroidism due to acquired atrophy of thyroid       lisinopril 40 MG tablet    PRINIVIL/ZESTRIL    90 tablet    Take 1 tablet (40 mg) by mouth daily for blood pressure.    Benign hypertension with chronic kidney disease, stage III       lovastatin 20 MG tablet    MEVACOR    90 tablet     Take 1 tablet (20 mg) by mouth every evening for cholesterol.    Hyperlipidemia LDL goal <130       metoprolol 25 MG 24 hr tablet    TOPROL-XL    90 tablet    Take 1 tablet daily for blood pressure.    Benign hypertension with chronic kidney disease, stage III       omeprazole 20 MG CR capsule    priLOSEC    180 capsule    Take 2 capsules (40 mg) by mouth daily for reflux.    Gastroesophageal reflux disease without esophagitis       PRESERVISION AREDS PO      Take 1 capsule by mouth 2 times daily (with meals) (PreserVision AREDS 2)        sulfamethoxazole-trimethoprim 800-160 MG per tablet    BACTRIM DS/SEPTRA DS    14 tablet    Take 1 tablet by mouth 2 times daily for 7 days for skin infection.    Furuncle of chest wall       TYLENOL 500 MG tablet   Generic drug:  acetaminophen      Take 2 tablets by mouth 2 times daily as needed for pain (knee).        VITAMIN D (CHOLECALCIFEROL) PO      Take 1,000 Units by mouth daily

## 2017-10-03 NOTE — PROGRESS NOTES
"  SUBJECTIVE:   James Lama is a 89 year old male who presents to clinic today for the following health issues:  He is accompanied by his wife.     Concern - Derm Problem  Onset: about 15 years    Description:   Patient complains of a spot in the mid chest for many years but since a week ago it started to swelling up and look like a boil with drainage.  This area was sore as well.    Intensity: moderate    Progression of Symptoms:  Worsening x1week    Accompanying Signs & Symptoms:  Bleeding/drainage, swelling, redness    Previous history of similar problem:   Yes, many years ago there was a spot on the mid chest and it was scraped    Precipitating factors:   Worsened by: None    Alleviating factors:  Improved by: None    Therapies Tried and outcome: None    Past medical, family, and social histories, medications, and allergies are reviewed and updated in Epic.     ROS:  C: NEGATIVE for fever, chills, change in weight  E/M: NEGATIVE for ear, mouth and throat problems  R: NEGATIVE for significant cough or SOB  CV: NEGATIVE for chest pain, palpitations or peripheral edema  ROS otherwise negative    Any history above obtained by the Medical Assistant was reviewed by Dr. Juan Carlos Jansen MD, and edited when necessary.    This document serves as a record of the services and decisions personally performed and made by Dr. Jansen. It was created on his behalf by Romana Roberto, a trained medical scribe. The creation of this document is based the provider's statements to the medical scribe.  Romana Roberto October 3, 2017 12:15 PM     OBJECTIVE:                                                    /63 (BP Location: Right arm, Patient Position: Chair, Cuff Size: Adult Large)  Pulse 52  Temp 98.7  F (37.1  C) (Oral)  Ht 1.746 m (5' 8.75\")  Wt 96.6 kg (213 lb)  SpO2 99%  BMI 31.68 kg/m2   Body mass index is 31.68 kg/(m^2).     GENERAL: healthy, alert and no distress  EYES: Eyes grossly normal to inspection, " PERRL, EOMI, sclerae white and conjunctivae normal  MS: no gross musculoskeletal defects noted, no edema  SKIN: Furuncle present in the center of the chest roughly at the xyphoid. Some pus is scabbed over. When the lesion is cleansed with rubbing alcohol, the pus starts to drain. As I apply pressure to express this material, keratinic material suggesting underlying sebaceous cyst is also expressed.   NEURO: Normal strength and tone, sensory exam grossly normal, mentation intact, oriented times 3 and cranial nerves 2-12 intact  PSYCH: mentation appears normal, affect normal/bright     Diagnostic Test Results:  none      ASSESSMENT/PLAN:                                                      (L02.223) Furuncle of chest wall  (primary encounter diagnosis)  Comment: Probable infected sebaceous cyst.   Plan: Skin Culture Aerobic Bacterial,         sulfamethoxazole-trimethoprim (BACTRIM         DS/SEPTRA DS) 800-160 MG per tablet        Antibiotic ointment and bandage applied. Wound care discussed with the patient during the procedure. The patient tolerated the procedure well. Return in about 10 days (around 10/13/2017) for recheck if the lesion does not seem to have healed up properly by then.     The information in this document, created by the medical scribe for me, accurately reflects the services I personally performed and the decisions made by me. I have reviewed and approved this document for accuracy prior to leaving the patient care area. October 3, 2017 12:15 PM   Juan Carlos Jansen MD

## 2017-10-11 LAB
BACTERIA SPEC CULT: NORMAL
BACTERIA SPEC CULT: NORMAL
SPECIMEN SOURCE: NORMAL

## 2017-11-01 ENCOUNTER — TRANSFERRED RECORDS (OUTPATIENT)
Dept: HEALTH INFORMATION MANAGEMENT | Facility: CLINIC | Age: 82
End: 2017-11-01

## 2018-01-24 DIAGNOSIS — N18.30 BENIGN HYPERTENSION WITH CHRONIC KIDNEY DISEASE, STAGE III (H): ICD-10-CM

## 2018-01-24 DIAGNOSIS — I12.9 BENIGN HYPERTENSION WITH CHRONIC KIDNEY DISEASE, STAGE III (H): ICD-10-CM

## 2018-01-24 NOTE — TELEPHONE ENCOUNTER
"Requested Prescriptions   Pending Prescriptions Disp Refills     amLODIPine (NORVASC) 5 MG tablet [Pharmacy Med Name: AMLODIPINE 5MG TAB]  Last Written Prescription Date:  03/02/17  Last Fill Quantity: 90,  # refills: 0   Last Office Visit with CATHERINE, RADHAMES or [facility] prescribing provider:  10/03/17   Future Office Visit:    Next 5 appointments (look out 90 days)     Mar 09, 2018 10:45 AM CST   Return Visit with Catia Loredo MD   [facility] ([facility])    [id number] [place] [id number] [phone]                90 tablet 0     Sig: TAKE ONE TABLET BY MOUTH ONCE DAILY FOR BLOOD PRESSURE    Calcium Channel Blockers Protocol  Passed    1/24/2018 12:22 PM       Passed - Blood pressure under 140/90    BP Readings from Last 3 Encounters:   10/03/17 122/63   09/01/17 136/70   04/06/17 141/65                Passed - Recent or future visit with authorizing provider    Patient had office visit in the last year or has a visit in the next 30 days with authorizing provider.  See \"Patient Info\" tab in inbasket, or \"Choose Columns\" in Meds & Orders section of the refill encounter.            Passed - Patient is age 18 or older       Passed - Normal serum creatinine on file in past 12 months    Recent Labs   Lab Test  09/01/17   1140   02/28/11   1441   CR  1.25   < >   --    CRPOC   --    --   1.1    < > = values in this interval not displayed.               "

## 2018-01-25 RX ORDER — AMLODIPINE BESYLATE 5 MG/1
TABLET ORAL
Qty: 90 TABLET | Refills: 0 | Status: SHIPPED | OUTPATIENT
Start: 2018-01-25 | End: 2018-03-08

## 2018-02-21 DIAGNOSIS — K21.9 GASTROESOPHAGEAL REFLUX DISEASE WITHOUT ESOPHAGITIS: ICD-10-CM

## 2018-02-21 DIAGNOSIS — J30.89 PERENNIAL ALLERGIC RHINITIS: ICD-10-CM

## 2018-02-21 NOTE — TELEPHONE ENCOUNTER
"Requested Prescriptions   Pending Prescriptions Disp Refills     omeprazole (PRILOSEC) 20 MG CR capsule [Pharmacy Med Name: OMEPRAZOLE 20MG CAP]    Last Written Prescription Date:  1/27/17  Last Fill Quantity: 180,  # refills: 3   Last Office Visit with CATHERINE, RADHAMES or [facility] prescribing provider:  10/3/17   Future Office Visit:    Next 5 appointments (look out 90 days)     Mar 09, 2018 10:45 AM CST   Return Visit with Catia oLredo MD   [facility] ([facility])    [address] [place] [id number] [phone]                  180 capsule 3     Sig: TAKE TWO CAPSULES BY MOUTH ONCE DAILY FOR  REFLUX    PPI Protocol Passed    2/21/2018 10:22 AM       Passed - Not on Clopidogrel (unless Pantoprazole ordered)       Passed - No diagnosis of osteoporosis on record       Passed - Recent or future visit with authorizing provider's specialty    Patient had office visit in the last year or has a visit in the next 30 days with authorizing provider.  See \"Patient Info\" tab in inbasket, or \"Choose Columns\" in Meds & Orders section of the refill encounter.            Passed - Patient is age 18 or older              Micky Faarax  [facility]  "

## 2018-02-21 NOTE — TELEPHONE ENCOUNTER
"Requested Prescriptions   Pending Prescriptions Disp Refills     fluticasone (FLONASE) 50 MCG/ACT spray [Pharmacy Med Name: FLUTICASONE 50MCG   SPR]    Last Written Prescription Date:  1/27/17  Last Fill Quantity: 1 bottle,  # refills: 1   Last Office Visit with FMKEISHA, NAGIP or [facility] prescribing provider:  10/3/17   Future Office Visit:    Next 5 appointments (look out 90 days)     Mar 01, 2018 10:40 AM CST   Office Visit with Juan Carlos [facility]ansen MD   [facility]    [address] [facility] [id number] [phone]            Mar 09, 2018 10:45 AM CST   Return Visit with Catia Loredo MD   [facility]    [address] [place] [id number] [phone]                   1     Sig: USE ONE TO TWO SPRAY(S) IN EACH NOSTRIL ONCE DAILY FOR  ALLERGY  PREVENTION    Inhaled Steroids Protocol Passed    2/21/2018 12:28 PM       Passed - Patient is age 12 or older       Passed - Recent or future visit with authorizing provider's specialty    Patient had office visit in the last year or has a visit in the next 30 days with authorizing provider.  See \"Patient Info\" tab in inbasket, or \"Choose Columns\" in Meds & Orders section of the refill encounter.                   Micky Faarax  [facility]  "

## 2018-02-22 RX ORDER — FLUTICASONE PROPIONATE 50 MCG
SPRAY, SUSPENSION (ML) NASAL
Qty: 1 BOTTLE | Refills: 1 | Status: SHIPPED | OUTPATIENT
Start: 2018-02-22 | End: 2020-02-05

## 2018-02-22 NOTE — TELEPHONE ENCOUNTER
Prescription approved per [facility] Refill Protocol.  Rosanna Mishra RN

## 2018-02-22 NOTE — TELEPHONE ENCOUNTER
Medication is being filled for 1 time refill only due to:  Patient needs to be seen because due for follow-up with PCP.  Per chart review patient has an appointment with Dr. Jansen on 3/1/18.  Closing encounter.    Marta Cody RN

## 2018-02-28 ENCOUNTER — TELEPHONE (OUTPATIENT)
Dept: DERMATOLOGY | Facility: CLINIC | Age: 83
End: 2018-02-28

## 2018-02-28 NOTE — TELEPHONE ENCOUNTER
----- Message from Susi Keene LPN sent at 2017  8:21 AM CST -----  Regardin year appointment reminder  Call patient to schedule 1 year appointment for 2018.

## 2018-03-08 ENCOUNTER — OFFICE VISIT (OUTPATIENT)
Dept: FAMILY MEDICINE | Facility: CLINIC | Age: 83
End: 2018-03-08
Payer: COMMERCIAL

## 2018-03-08 VITALS
WEIGHT: 214 LBS | BODY MASS INDEX: 31.7 KG/M2 | SYSTOLIC BLOOD PRESSURE: 139 MMHG | HEART RATE: 68 BPM | TEMPERATURE: 97.7 F | HEIGHT: 69 IN | DIASTOLIC BLOOD PRESSURE: 63 MMHG | OXYGEN SATURATION: 97 %

## 2018-03-08 DIAGNOSIS — K21.9 GASTROESOPHAGEAL REFLUX DISEASE WITHOUT ESOPHAGITIS: ICD-10-CM

## 2018-03-08 DIAGNOSIS — I12.9 BENIGN HYPERTENSION WITH CHRONIC KIDNEY DISEASE, STAGE III (H): ICD-10-CM

## 2018-03-08 DIAGNOSIS — G31.84 COGNITIVE IMPAIRMENT, MILD, SO STATED: ICD-10-CM

## 2018-03-08 DIAGNOSIS — E03.4 HYPOTHYROIDISM DUE TO ACQUIRED ATROPHY OF THYROID: ICD-10-CM

## 2018-03-08 DIAGNOSIS — E78.5 HYPERLIPIDEMIA LDL GOAL <130: ICD-10-CM

## 2018-03-08 DIAGNOSIS — N18.30 BENIGN HYPERTENSION WITH CHRONIC KIDNEY DISEASE, STAGE III (H): ICD-10-CM

## 2018-03-08 DIAGNOSIS — Z00.00 ENCOUNTER FOR ROUTINE ADULT HEALTH EXAMINATION WITHOUT ABNORMAL FINDINGS: Primary | ICD-10-CM

## 2018-03-08 LAB
ALT SERPL W P-5'-P-CCNC: 14 U/L (ref 0–70)
CHOLEST SERPL-MCNC: 148 MG/DL
CREAT SERPL-MCNC: 1.13 MG/DL (ref 0.66–1.25)
GFR SERPL CREATININE-BSD FRML MDRD: 61 ML/MIN/1.7M2
HDLC SERPL-MCNC: 38 MG/DL
LDLC SERPL CALC-MCNC: 67 MG/DL
NONHDLC SERPL-MCNC: 110 MG/DL
POTASSIUM SERPL-SCNC: 4.2 MMOL/L (ref 3.4–5.3)
TRIGL SERPL-MCNC: 216 MG/DL
TSH SERPL DL<=0.005 MIU/L-ACNC: 3.62 MU/L (ref 0.4–4)

## 2018-03-08 PROCEDURE — 36415 COLL VENOUS BLD VENIPUNCTURE: CPT | Performed by: FAMILY MEDICINE

## 2018-03-08 PROCEDURE — 84132 ASSAY OF SERUM POTASSIUM: CPT | Performed by: FAMILY MEDICINE

## 2018-03-08 PROCEDURE — 84443 ASSAY THYROID STIM HORMONE: CPT | Performed by: FAMILY MEDICINE

## 2018-03-08 PROCEDURE — 80061 LIPID PANEL: CPT | Performed by: FAMILY MEDICINE

## 2018-03-08 PROCEDURE — 82565 ASSAY OF CREATININE: CPT | Performed by: FAMILY MEDICINE

## 2018-03-08 PROCEDURE — 84460 ALANINE AMINO (ALT) (SGPT): CPT | Performed by: FAMILY MEDICINE

## 2018-03-08 PROCEDURE — 99397 PER PM REEVAL EST PAT 65+ YR: CPT | Performed by: FAMILY MEDICINE

## 2018-03-08 RX ORDER — LOVASTATIN 20 MG
20 TABLET ORAL EVERY EVENING
Qty: 90 TABLET | Refills: 1 | Status: SHIPPED | OUTPATIENT
Start: 2018-03-08 | End: 2018-09-10

## 2018-03-08 RX ORDER — AMLODIPINE BESYLATE 5 MG/1
5 TABLET ORAL DAILY
Qty: 90 TABLET | Refills: 1 | Status: SHIPPED | OUTPATIENT
Start: 2018-03-08 | End: 2018-07-16 | Stop reason: SINTOL

## 2018-03-08 RX ORDER — LISINOPRIL 40 MG/1
40 TABLET ORAL DAILY
Qty: 90 TABLET | Refills: 1 | Status: SHIPPED | OUTPATIENT
Start: 2018-03-08 | End: 2018-09-10

## 2018-03-08 RX ORDER — METOPROLOL SUCCINATE 25 MG/1
TABLET, EXTENDED RELEASE ORAL
Qty: 90 TABLET | Refills: 1 | Status: SHIPPED | OUTPATIENT
Start: 2018-03-08 | End: 2018-09-10

## 2018-03-08 RX ORDER — LEVOTHYROXINE SODIUM 88 UG/1
88 TABLET ORAL DAILY
Qty: 90 TABLET | Refills: 1 | Status: SHIPPED | OUTPATIENT
Start: 2018-03-08 | End: 2018-09-10

## 2018-03-08 ASSESSMENT — PAIN SCALES - GENERAL: PAINLEVEL: NO PAIN (0)

## 2018-03-08 NOTE — PATIENT INSTRUCTIONS
At [facility], we strive to deliver an exceptional experience to you, every time we see you.  If you receive a survey in the mail, please send us back your thoughts. We really do value your feedback.    Based on your medical history, these are the current health maintenance/preventive care services that you are due for (some may have been done at this visit.)  Health Maintenance Due   Topic Date Due     EYE EXAM Q1 YEAR  08/05/2016     PHQ-9 Q1YR  02/16/2018     MARJ QUESTIONNAIRE 1 YEAR  03/02/2018         Suggested websites for health information:  Www.AMT.PharmAssistant : Up to date and easily searchable information on multiple topics.  Www.Curiously.gov : medication info, interactive tutorials, watch real surgeries online  Www.familydoctor.org : good info from the Academy of Family Physicians  Www.cdc.gov : public health info, travel advisories, epidemics (H1N1)  Www.aap.org : children's health info, normal development, vaccinations  Www.health.[facility].[place].us : [place] dept of health, public health issues in [place], N1N1    Your care team:                            Family Medicine Internal Medicine   MD Trino Lombardo MD Shantel Branch-Fleming, MD Katya Georgiev PA-C Megan Hill, APRN CNP    Sergo Rolon MD Pediatrics   Yimi Post PAESME Schwarz, CNP Yolie Collins APRN CNP   MD Chelo Ansari MD Deborah Mielke, MD Kim Thein, APRN [facility]      Clinic hours: Monday - Thursday 7 am-7 pm; Fridays 7 am-5 pm.   Urgent care: Monday - Friday 11 am-9 pm; Saturday and Sunday 9 am-5 pm.  Pharmacy : Monday -Thursday 8 am-8 pm; Friday 8 am-6 pm; Saturday and Sunday 9 am-5 pm.     Clinic: [phone]   Pharmacy: [phone]        Preventive Health Recommendations:       Male Ages 65 and over    Yearly exam:             See your health care provider every year in order to  o   Review health changes.   o   Discuss preventive care.    o   Review your medicines if your  doctor has prescribed any.    Talk with your health care provider about whether you should have a test to screen for prostate cancer (PSA).    Every 3 years, have a diabetes test (fasting glucose). If you are at risk for diabetes, you should have this test more often.    Every 5 years, have a cholesterol test. Have this test more often if you are at risk for high cholesterol or heart disease.     Every 10 years, have a colonoscopy. Or, have a yearly FIT test (stool test). These exams will check for colon cancer.    Talk to with your health care provider about screening for Abdominal Aortic Aneurysm if you have a family history of AAA or have a history of smoking.  Shots:     Get a flu shot each year.     Get a tetanus shot every 10 years.     Talk to your doctor about your pneumonia vaccines. There are now two you should receive - Pneumovax (PPSV 23) and Prevnar (PCV 13).    Talk to your doctor about a shingles vaccine.     Talk to your doctor about the hepatitis B vaccine.  Nutrition:     Eat at least 5 servings of fruits and vegetables each day.     Eat whole-grain bread, whole-wheat pasta and brown rice instead of white grains and rice.     Talk to your doctor about Calcium and Vitamin D.   Lifestyle    Exercise for at least 150 minutes a week (30 minutes a day, 5 days a week). This will help you control your weight and prevent disease.     Limit alcohol to one drink per day.     No smoking.     Wear sunscreen to prevent skin cancer.     See your dentist every six months for an exam and cleaning.     See your eye doctor every 1 to 2 years to screen for conditions such as glaucoma, macular degeneration and cataracts.

## 2018-03-08 NOTE — PROGRESS NOTES
"  SUBJECTIVE:   James Lama is a 89 year old male who presents for Preventive Visit.  He is accompanied by his wife.     Are you in the first 12 months of your Medicare Part B coverage?  No    Healthy Habits:    Do you get at least three servings of calcium containing foods daily (dairy, green leafy vegetables, etc.)? yes    Amount of exercise or daily activities, outside of work: none    Problems taking medications regularly No    Medication side effects: No    Have you had an eye exam in the past two years? yes    Do you see a dentist twice per year? no    Do you have sleep apnea, excessive snoring or daytime drowsiness?no      Ability to successfully perform activities of daily living: Yes, no assistance needed    Home safety:  none identified     Hearing impairment: No    Fall risk:  Fallen 2 or more times in the past year?: No  Any fall with injury in the past year?: Yes      COGNITIVE SCREEN  1) Repeat 3 items (Banana, Sunrise, Chair)  YES  2) Clock draw: NORMAL  3) 3 item recall: Recalls NO objects   Results: 0 items recalled: PROBABLE COGNITIVE IMPAIRMENT, **INFORM PROVIDER** [Recalls \"Chair\" when reprompted. He recalls 0 items (says \"Banana\" and \"Chair\") with retesting with \"Flashlight, Table, Yael.\"]    Mini-CogTM Copyright CHELO Jean. Licensed by the author for use in [facility]; reprinted with permission ([email or web address]@.[email or web address]). All rights reserved.      Social History   Substance Use Topics     Smoking status: Never Smoker     Smokeless tobacco: Never Used     Alcohol use No       If you drink alcohol do you typically have >3 drinks per day or >7 drinks per week? No                        Today's PHQ-2 Score:   PHQ-2 ( 1999 Pfizer) 3/8/2018 1/27/2017   Q1: Little interest or pleasure in doing things 0 0   Q2: Feeling down, depressed or hopeless 0 0   PHQ-2 Score 0 0       Do you feel safe in your environment - YES    Do you have a Health Care Directive?: No: Advance care planning reviewed with " patient; information given to patient to review.    Current providers sharing in care for this patient include:   Patient Care Team:  Juan Carlos Jansen MD as PCP - General    The following health maintenance items are reviewed in Epic and correct as of today:  Health Maintenance   Topic Date Due     EYE EXAM Q1 YEAR  08/05/2016     PHQ-9 Q1YR  02/16/2018     MARJ QUESTIONNAIRE 1 YEAR  03/02/2018     TSH Q1 YEAR  09/01/2018     INFLUENZA VACCINE (SYSTEM ASSIGNED)  09/01/2018     FALL RISK ASSESSMENT  10/03/2018     LIPID MONITORING Q2 YEARS  09/01/2019     ADVANCE DIRECTIVE PLANNING Q5 YRS  03/02/2022     TETANUS IMMUNIZATION (SYSTEM ASSIGNED)  11/02/2022     PNEUMOCOCCAL  Completed     Past medical, family, and social histories, medications, and allergies are reviewed and updated in Epic.     ROS:  NEURO: He mentions he is having a hard time recalling names, and his wife notes that he has trouble recalling more things than just names as well. He carries his drivers license around just in case he forgets who he is and where he lives, etc.  HEENT: Wife notes patient does not wear his hearing aids. Additionally, wife states he had a cough for about a week and a half but this has since resolved since she made the appointment.   MS: He takes indomethacin occasionally as needed for gout - but he has not had a flare in a while so hasn't taken this medication recently.   Constitutional, HEENT, cardiovascular, pulmonary, GI, , musculoskeletal, neuro, skin, endocrine and psych systems are negative, except as otherwise noted.    This document serves as a record of the services and decisions personally performed and made by Dr. Jansen. It was created on his behalf by Romana Roberto, a trained medical scribe. The creation of this document is based the provider's statements to the medical scribe.  Romana Roberto March 8, 2018 11:30 AM   OBJECTIVE:   /63 (BP Location: Left arm, Patient Position: Chair, Cuff Size:  "Adult Large)  Pulse 68  Temp 97.7  F (36.5  C) (Oral)  Ht 5' 8.75\" (1.746 m)  Wt 214 lb (97.1 kg)  SpO2 97%  BMI 31.83 kg/m2 Estimated body mass index is 31.83 kg/(m^2) as calculated from the following:    Height as of this encounter: 5' 8.75\" (1.746 m).    Weight as of this encounter: 214 lb (97.1 kg).  EXAM:  GENERAL: healthy, alert and no distress  EYES: Eyes grossly normal to inspection, PERRL and conjunctivae and sclerae normal  HENT: ear canals and TM's normal, nose and mouth without ulcers or lesions  NECK: no adenopathy, no asymmetry, masses, or scars and thyroid normal to palpation  RESP: lungs clear to auscultation, no crackles or wheezes, no areas of dullness, no tachypnea   CV: regular rate and rhythm, normal S1 S2, no S3 or S4, no murmur, click or rub, no peripheral edema and peripheral pulses strong  ABDOMEN: soft, nontender, no hepatosplenomegaly, no masses and bowel sounds normal   (male): normal male genitalia without lesions or urethral discharge, no hernia  MS: no gross musculoskeletal defects noted, no edema  SKIN: no suspicious lesions or rashes  NEURO: Normal strength and tone, mentation intact and speech normal, DTRs symmetrical, cranial nerves 2-12 intact   PSYCH: mentation appears normal, affect normal/bright     ASSESSMENT / PLAN:   (Z00.00) Encounter for routine adult health examination without abnormal findings  (primary encounter diagnosis)  Comment: Negative screening exam; up-to-date on preventive services.   Plan: Follow up in 1 year.    (I12.9,  N18.3) Benign hypertension with chronic kidney disease, stage III  Comment: Controlled.  Plan: amLODIPine (NORVASC) 5 MG tablet, lisinopril         (PRINIVIL/ZESTRIL) 40 MG tablet, metoprolol         succinate (TOPROL-XL) 25 MG 24 hr tablet,         Potassium, Creatinine        Return in about 6 months (around 9/7/2018) for cholesterol, blood pressure check.     (E78.5) Hyperlipidemia LDL goal <130  Comment: Non-fasting. historically " "at goal.  Plan: lovastatin (MEVACOR) 20 MG tablet, ALT, Lipid         panel reflex to direct LDL Non-fasting        Follow up in 6 months.     (E03.4) Hypothyroidism due to acquired atrophy of thyroid  Comment: Historically euthyroid.  Plan: levothyroxine (SYNTHROID/LEVOTHROID) 88 MCG         tablet, TSH with free T4 reflex        Adjust levothyroxine as needed. Follow up in 6 months.     (G31.84) Cognitive impairment, mild, so stated  Comment: Could be considered WNL for age.  Plan: Monitor periodically.     (K21.9) Gastroesophageal reflux disease without esophagitis  Comment: prescription update   Plan: omeprazole (PRILOSEC) 20 MG CR capsule      End of Life Planning:  Patient currently has an advanced directive: No. I have verified the patient's ablity to prepare an advanced directive/make health care decisions.     COUNSELING:  Reviewed preventive health counseling, as reflected in patient instructions  Special attention given to:       Regular exercise      Estimated body mass index is 31.83 kg/(m^2) as calculated from the following:    Height as of this encounter: 5' 8.75\" (1.746 m).    Weight as of this encounter: 214 lb (97.1 kg).  Weight management plan: Discussed healthy diet and exercise guidelines and patient will follow up in 12 months in clinic to re-evaluate. For exercise, he plows his driveway and does some wood working (but he notes this is mainly standing). Wife notes they went to a community class on arthritis prevention and walking and patient walks a lot, but is limited by his wife's own stamina. They also joined a health club but have never went.      reports that he has never smoked. He has never used smokeless tobacco.      Appropriate preventive services were discussed with this patient, including applicable screening as appropriate for cardiovascular disease, diabetes, osteopenia/osteoporosis, and glaucoma.  As appropriate for age/gender, discussed screening for colorectal cancer, prostate " cancer, breast cancer, and cervical cancer. Checklist reviewing preventive services available has been given to the patient.    Reviewed patients plan of care and provided an AVS. The Basic Care Plan (routine screening as documented in Health Maintenance) for James meets the Care Plan requirement. This Care Plan has been established and reviewed with the Patient.    Counseling Resources:  ATP IV Guidelines  Pooled Cohorts Equation Calculator  Breast Cancer Risk Calculator  FRAX Risk Assessment  ICSI Preventive Guidelines  Dietary Guidelines for Americans, 2010  USDA's MyPlate  ASA Prophylaxis  Lung CA Screening    The information in this document, created by the medical scribe for me, accurately reflects the services I personally performed and the decisions made by me. I have reviewed and approved this document for accuracy prior to leaving the patient care area. March 8, 2018 11:29 AM      Juan Carlos Jansen MD  [facility]

## 2018-03-08 NOTE — MR AVS SNAPSHOT
After Visit Summary   3/8/2018    James Lama    MRN: [id number]           Patient Information     Date Of Birth          6/28/1927        Visit Information        Provider Department      3/8/2018 11:20 AM Juan Carlos Jansen MD [facility]        Today's Diagnoses     Encounter for routine adult health examination without abnormal findings    -  1    Benign hypertension with chronic kidney disease, stage III        Hyperlipidemia LDL goal <130        Hypothyroidism due to acquired atrophy of thyroid        Cognitive impairment, mild, so stated        Gastroesophageal reflux disease without esophagitis          Care Instructions    At [facility], we strive to deliver an exceptional experience to you, every time we see you.  If you receive a survey in the mail, please send us back your thoughts. We really do value your feedback.    Based on your medical history, these are the current health maintenance/preventive care services that you are due for (some may have been done at this visit.)  Health Maintenance Due   Topic Date Due     EYE EXAM Q1 YEAR  08/05/2016     PHQ-9 Q1YR  02/16/2018     MARJ QUESTIONNAIRE 1 YEAR  03/02/2018         Suggested websites for health information:  Www.[employer].HOSTING : Up to date and easily searchable information on multiple topics.  Www.medlineplus.gov : medication info, interactive tutorials, watch real surgeries online  Www.familydoctor.org : good info from the Academy of Family Physicians  Www.cdc.gov : public health info, travel advisories, epidemics (H1N1)  Www.aap.org : children's health info, normal development, vaccinations  Www.health.state.[place].us : [place] dept of health, public health issues in [place], N1N1    Your care team:                            Family Medicine Internal Medicine   MD Trino Lombardo MD Shantel Branch-Fleming, MD Katya Georgiev PA-C Megan Hill, APRN CNP Nam Ho, MD Pediatrics   Yimi  WERO Post CNP Amelia Massimini APRN CNP Shaista Malik, MD Bethany Templen, MD Deborah Mielke, MD Kim Thein, APRN CNP      Clinic hours: Monday - Thursday 7 am-7 pm; Fridays 7 am-5 pm.   Urgent care: Monday - Friday 11 am-9 pm; Saturday and Sunday 9 am-5 pm.  Pharmacy : Monday -Thursday 8 am-8 pm; Friday 8 am-6 pm; Saturday and Sunday 9 am-5 pm.     Clinic: [phone]   Pharmacy: [phone]        Preventive Health Recommendations:       Male Ages 65 and over    Yearly exam:             See your health care provider every year in order to  o   Review health changes.   o   Discuss preventive care.    o   Review your medicines if your doctor has prescribed any.    Talk with your health care provider about whether you should have a test to screen for prostate cancer (PSA).    Every 3 years, have a diabetes test (fasting glucose). If you are at risk for diabetes, you should have this test more often.    Every 5 years, have a cholesterol test. Have this test more often if you are at risk for high cholesterol or heart disease.     Every 10 years, have a colonoscopy. Or, have a yearly FIT test (stool test). These exams will check for colon cancer.    Talk to with your health care provider about screening for Abdominal Aortic Aneurysm if you have a family history of AAA or have a history of smoking.  Shots:     Get a flu shot each year.     Get a tetanus shot every 10 years.     Talk to your doctor about your pneumonia vaccines. There are now two you should receive - Pneumovax (PPSV 23) and Prevnar (PCV 13).    Talk to your doctor about a shingles vaccine.     Talk to your doctor about the hepatitis B vaccine.  Nutrition:     Eat at least 5 servings of fruits and vegetables each day.     Eat whole-grain bread, whole-wheat pasta and brown rice instead of white grains and rice.     Talk to your doctor about Calcium and Vitamin D.   Lifestyle    Exercise for at least 150 minutes a week (30  "minutes a day, 5 days a week). This will help you control your weight and prevent disease.     Limit alcohol to one drink per day.     No smoking.     Wear sunscreen to prevent skin cancer.     See your dentist every six months for an exam and cleaning.     See your eye doctor every 1 to 2 years to screen for conditions such as glaucoma, macular degeneration and cataracts.          Follow-ups after your visit        Follow-up notes from your care team     Return in about 6 months (around 9/7/2018) for cholesterol, blood pressure check.      Your next 10 appointments already scheduled     Mar 09, 2018 10:45 AM CST   [facility] Visit [facility] Catia [facility]o MD   [facility]    [address] [address] [place] [id number] [phone]              Who to contact     If you have questions or need follow up information about today's clinic visit or your schedule please contact [facility] directly at [phone].  Normal or non-critical lab and imaging results will be communicated to you by TrueVaulthart, letter or phone within 4 business days after the clinic has received the results. If you do not hear from us within 7 days, please contact the clinic through [employer] or phone. If you have a critical or abnormal lab result, we will notify you by phone as soon as possible.  Submit refill requests through [employer] or call your pharmacy and they will forward the refill request to us. Please allow 3 business days for your refill to be completed.          Additional Information About Your Visit        ProtAb Information     ProtAb lets you send messages to your doctor, view your test results, renew your prescriptions, schedule appointments and more. To sign up, go to [email or web address] . Click on \"Log in\" on the left side of the screen, which will take you to the Welcome page. Then click on \"Sign up Now\" on the right side of the page.     You will be asked to " "enter the access code listed below, as well as some personal information. Please follow the directions to create your username and password.     Your access code is: [email or web address]  Expires: 2018 11:46 AM     Your access code will  in 90 days. If you need help or a new code, please call your [facility] clinic or [phone].        Care EveryWhere ID     This is your Care EveryWhere ID. This could be used by other organizations to access your [facility] medical records  [id number]        Your Vitals Were     Pulse Temperature Height Pulse Oximetry BMI (Body Mass Index)       68 97.7  F (36.5  C) (Oral) 5' 8.75\" (1.746 m) 97% 31.83 kg/m2        Blood Pressure from Last 3 Encounters:   18 139/63   10/03/17 122/63   17 136/70    Weight from Last 3 Encounters:   18 214 lb (97.1 kg)   10/03/17 213 lb (96.6 kg)   17 215 lb (97.5 kg)              We Performed the Following     ALT     Creatinine     Lipid panel reflex to direct LDL Non-fasting     Potassium     TSH with free T4 reflex          Today's Medication Changes          These changes are accurate as of 3/8/18 11:47 AM.  If you have any questions, ask your nurse or doctor.               These medicines have changed or have updated prescriptions.        Dose/Directions    amLODIPine 5 MG tablet   Commonly known as:  NORVASC   This may have changed:  See the new instructions.   Used for:  Benign hypertension with chronic kidney disease, stage III   Changed by:  Juan Carlos Jansen MD        Dose:  5 mg   Take 1 tablet (5 mg) by mouth daily for blood pressure.   Quantity:  90 tablet   Refills:  1       omeprazole 20 MG CR capsule   Commonly known as:  priLOSEC   This may have changed:  See the new instructions.   Used for:  Gastroesophageal reflux disease without esophagitis   Changed by:  Juan Carlos Jansen MD        Dose:  40 mg   Take 2 capsules (40 mg) by mouth daily for reflux. Take 30-60 minutes before a meal.   Quantity:  180 " capsule   Refills:  3            Where to get your medicines      These medications were sent to [facility] [address] [place] [address], [place] - [address] [facility]  [address] [facility] [address]     Phone:  [phone]     amLODIPine 5 MG tablet    levothyroxine 88 MCG tablet    lisinopril 40 MG tablet    lovastatin 20 MG tablet    metoprolol succinate 25 MG 24 hr tablet    omeprazole 20 MG CR capsule                Primary Care Provider Office Phone # Fax #    Juan Carlos Jansen [phone]       [address]  [facility] [address]        Equal Access to Services     [facility]: Hadii aad ku hadasho Soomaali, waaxda luqadaha, qaybta kaalmada adeegyada, waxay idiin hayaan adeeg kharaami mckee . So [facility] [phone].    ATENCIÓN: Si habla español, tiene a londono disposición servicios gratuitos de asistencia lingüística. [facility] [phone].    We comply with applicable federal civil rights laws and [place] laws. We do not discriminate on the basis of race, color, national origin, age, disability, sex, sexual orientation, or gender identity.            Thank you!     Thank you for choosing [facility]  for your care. Our goal is always to provide you with excellent care. Hearing back from our patients is one way we can continue to improve our services. Please take a few minutes to complete the written survey that you may receive in the mail after your visit with us. Thank you!             Your Updated Medication List - Protect others around you: Learn how to safely use, store and throw away your medicines at www.disposemymeds.org.          This list is accurate as of 3/8/18 11:47 AM.  Always use your most recent med list.                   Brand Name Dispense Instructions for use Diagnosis    amLODIPine 5 MG tablet    NORVASC    90 tablet    Take 1 tablet (5 mg) by mouth daily for blood pressure.    Benign hypertension with chronic kidney disease, stage III        aspirin 81 MG tablet      1 tablet daily*        cyanocobalamin 1000 MCG Tabs      1 tablet daily for foot neuropathy (vitamin B12).        fluticasone 50 MCG/ACT spray    FLONASE    1 Bottle    USE ONE TO TWO SPRAY(S) IN EACH NOSTRIL ONCE DAILY FOR  ALLERGY  PREVENTION    Perennial allergic rhinitis       indomethacin 50 MG capsule    INDOCIN    40 capsule    Take 1 capsule (50 mg) by mouth 3 times daily (with meals) as needed for gouty attack.    Gout       levothyroxine 88 MCG tablet    SYNTHROID/LEVOTHROID    90 tablet    Take 1 tablet (88 mcg) by mouth daily for thyroid.    Hypothyroidism due to acquired atrophy of thyroid       lisinopril 40 MG tablet    PRINIVIL/ZESTRIL    90 tablet    Take 1 tablet (40 mg) by mouth daily for blood pressure.    Benign hypertension with chronic kidney disease, stage III       lovastatin 20 MG tablet    MEVACOR    90 tablet    Take 1 tablet (20 mg) by mouth every evening for cholesterol.    Hyperlipidemia LDL goal <130       metoprolol succinate 25 MG 24 hr tablet    TOPROL-XL    90 tablet    Take 1 tablet daily for blood pressure.    Benign hypertension with chronic kidney disease, stage III       omeprazole 20 MG CR capsule    priLOSEC    180 capsule    Take 2 capsules (40 mg) by mouth daily for reflux. Take 30-60 minutes before a meal.    Gastroesophageal reflux disease without esophagitis       PRESERVISION AREDS PO      Take 1 capsule by mouth 2 times daily (with meals) (PreserVision AREDS 2)        TYLENOL 500 MG tablet   Generic drug:  acetaminophen      Take 2 tablets by mouth 2 times daily as needed for pain (knee).        VITAMIN D (CHOLECALCIFEROL) PO      Take 1,000 Units by mouth daily

## 2018-03-08 NOTE — LETTER
2018      James Lama  [address]  [place] [address]              Dear James Topetemgkate      All of your labs were normal for you.    Enclosed is a copy of the results.  It was a pleasure to see you at your last appointment.    If you have any questions or concerns, please call myself or my nurse at [phone].      Sincerely,      Juan Carlos Jansen MD/DELMY Maier MA  [employer]      Results for orders placed or performed in visit on 18   ALT   Result Value Ref Range    ALT 14 0 - 70 U/L   Lipid panel reflex to direct LDL Non-fasting   Result Value Ref Range    Cholesterol 148 <200 mg/dL    Triglycerides 216 (H) <150 mg/dL    HDL Cholesterol 38 (L) >39 mg/dL    LDL Cholesterol Calculated 67 <100 mg/dL    Non HDL Cholesterol 110 <130 mg/dL   TSH with free T4 reflex   Result Value Ref Range    TSH 3.62 0.40 - 4.00 mU/L   Potassium   Result Value Ref Range    Potassium 4.2 3.4 - 5.3 mmol/L   Creatinine   Result Value Ref Range    Creatinine 1.13 0.66 - 1.25 mg/dL    GFR Estimate 61 >60 mL/min/1.7m2    GFR Estimate If Black 74 >60 mL/min/1.7m2                 RE: James Lama   MRN: [id number]  : 1927  ENC DATE: Mar 8, 2018

## 2018-03-09 ENCOUNTER — OFFICE VISIT (OUTPATIENT)
Dept: DERMATOLOGY | Facility: CLINIC | Age: 83
End: 2018-03-09
Payer: COMMERCIAL

## 2018-03-09 DIAGNOSIS — Z86.018 HISTORY OF DYSPLASTIC NEVUS: Primary | ICD-10-CM

## 2018-03-09 DIAGNOSIS — L57.0 ACTINIC KERATOSIS: ICD-10-CM

## 2018-03-09 DIAGNOSIS — Z85.828 HISTORY OF NONMELANOMA SKIN CANCER: ICD-10-CM

## 2018-03-09 PROCEDURE — 17000 DESTRUCT PREMALG LESION: CPT | Performed by: DERMATOLOGY

## 2018-03-09 PROCEDURE — 99213 OFFICE O/P EST LOW 20 MIN: CPT | Mod: 25 | Performed by: DERMATOLOGY

## 2018-03-09 PROCEDURE — 17003 DESTRUCT PREMALG LES 2-14: CPT | Performed by: DERMATOLOGY

## 2018-03-09 ASSESSMENT — PAIN SCALES - GENERAL: PAINLEVEL: NO PAIN (0)

## 2018-03-09 NOTE — NURSING NOTE
Dermatology Rooming Note    James Lama's goals for this visit include:   Chief Complaint   Patient presents with     Skin Check     no areas of concern hx BCC and DN       Is a scribe okay for this visit:YES    Are records needed for this visit(If yes, obtain release of information): No     Vitals: There were no vitals taken for this visit.    Referring Provider:  No referring provider defined for this encounter.    Gabbie Tello LPN

## 2018-03-09 NOTE — PROGRESS NOTES
[facility] Dermatology Note      Dermatology Problem List:  1. 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 Hx of DN   -Junctional dysplastic nevus with mild atypia, left lower back, s/p 2/14/2017   3. Actinic keratosis  -s/p cryotherapy     Encounter Date: Mar 9, 2018    CC:  Chief Complaint   Patient presents with     Skin Check     no areas of concern hx BCC and DN         History of Present Illness:  Mr. James Lama is a 89 year old male who presents as a follow up for history of BCC. The patient was last seen 2/14/2017 when a mildly dysplastic nevus. Today, the patient reports that he has no areas of concern. The patient reports no spots that are bleeding crusting or changing. The patient reports no other lesions of concern.     The patient is present with his wife today.     Past Medical History:   Patient Active Problem List   Diagnosis     Prostate cancer (H)     Hyperlipidemia LDL goal <130     Benign hypertension with chronic kidney disease, stage III     KESHA (obstructive sleep apnea)     Gout     Diverticulosis     Perennial allergic rhinitis     GERD (gastroesophageal reflux disease)     Hypothyroidism due to acquired atrophy of thyroid     Erectile dysfunction     Hypertension goal BP (blood pressure) < 140/90     Right inguinal hernia     History of basal cell carcinoma     Advance care planning     Paresthesias/numbness, both feet     Vitamin B12 deficiency (non anemic)     Health Care Home     Actinic keratosis     Obesity, Class I, BMI 30-34.9     Recurrent falls     Cortical cataract of both eyes     Nuclear sclerosis of both eyes     Chronic kidney disease, stage 3 (moderate)     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     Family History:  No family history of skin cancer     Medications:  Current Outpatient Prescriptions   Medication Sig Dispense Refill     amLODIPine (NORVASC) 5 MG tablet Take 1 tablet (5 mg) by mouth daily for blood pressure. 90 tablet 1     levothyroxine (SYNTHROID/LEVOTHROID) 88 MCG tablet Take 1 tablet (88 mcg) by mouth daily for thyroid. 90 tablet 1     lisinopril (PRINIVIL/ZESTRIL) 40 MG tablet Take 1 tablet (40 mg) by mouth daily for blood pressure. 90 tablet 1     lovastatin (MEVACOR) 20 MG tablet Take 1 tablet (20 mg) by mouth every evening for cholesterol. 90 tablet 1     metoprolol succinate (TOPROL-XL) 25 MG 24 hr tablet Take 1 tablet daily for blood pressure. 90 tablet 1     omeprazole (PRILOSEC) 20 MG CR capsule Take 2 capsules (40 mg) by mouth daily for reflux. Take 30-60 minutes before a meal. 180 capsule 3     Multiple Vitamins-Minerals (PRESERVISION AREDS PO) Take 1 capsule by mouth 2 times daily (with meals) (PreserVision AREDS 2)       indomethacin (INDOCIN) 50 MG capsule Take 1 capsule (50 mg) by mouth 3 times daily (with meals) as needed for gouty  attack. 40 capsule 1     acetaminophen (TYLENOL) 500 MG tablet Take 2 tablets by mouth 2 times daily as needed for pain (knee).  0     VITAMIN D, CHOLECALCIFEROL, PO Take 1,000 Units by mouth daily        Cyanocobalamin 1000 MCG TABS 1 tablet daily for foot neuropathy (vitamin B12).       ASPIRIN 81 MG OR TABS 1 tablet daily*       fluticasone (FLONASE) 50 MCG/ACT spray USE ONE TO TWO SPRAY(S) IN EACH NOSTRIL ONCE DAILY FOR  ALLERGY  PREVENTION (Patient not taking: Reported on 3/9/2018) 1 Bottle 1       Allergies   Allergen Reactions     Acrylate Copolymer Rash     topical     Review of Systems:  -Skin: As above in HPI. No additional skin concerns.   -Const: the patient denies fever, chills, changes in weight, night sweats. No changes to medical history.     Physical exam:  Vitals: There were no vitals taken for this visit.  GEN: This is a well developed, well-nourished male in no acute distress, in a pleasant mood.    SKIN: Total skin excluding the undergarment areas was performed. The exam included the head/face, neck, both arms, chest, back, abdomen, both legs, digits and/or nails. Declines exam of feet and ankles.   -There are well healed surgical scars without erythema, nodularity or telangiectasias on the left preauricular, nasal tip upper back, and left lower back   -Papule on the left upper eyelid.   - There are erythematous macules with overyling adherent scale on the left forehead and right nasal tip .   -No other lesions of concern on areas examined.     Impression/Plan:  1. History of nonmelanoma skin cancer, no clincial evidence of recurrence:  2. History of actinic keratosis, left forehead, and the nose  Cryotherapy procedure note: After verbal consent and discussion of risks and benefits including but no limited to dyspigmentation/scar, blister, and pain, 2 was(were) treated with 1-2mm freeze border for 2 cycles with liquid nitrogen. Post cryotherapy instructions were provided.   3. Lesion, left upper  eyelid. Appears stable clinically    Following with Dr. Maulik Winslow    Follow up in 1 year, earlier for new or changing lesions.     Staff Involved:  Scribe/Staff    Scribe Disclosure:   I, Yessenia Neumann, am serving as a scribe to document services personally performed by Dr. Catia Loredo, based on data collection and the provider's statements to me.     Provider Disclosure:   The documentation recorded by the scribe accurately reflects the services I personally performed and the decisions made by me.    Catia Loredo MD    Department of Dermatology  [facility]: Phone: [phone], Fax:[phone]  [facility]: Phone: [phone], Fax: [phone]

## 2018-03-09 NOTE — PATIENT INSTRUCTIONS
CRYOTHERAPY    What is it?    Use of a very cold liquid, such as liquid nitrogen, to freeze and destroy abnormal skin cells that need to be removed    What should I expect?    Tenderness and redness    A small blister that might grow and fill with dark purple blood. There may be crusting.    More than one treatment may be needed if the lesions do not go away.    How do I care for the treated area?    Gently wash the area with your hands when bathing.    Use a thin layer of Vaselin to help with healing. You may use a Band-Aid.     The area should heal within 7-10 days.    Do not use an antibiotic or Neosporin ointment.     You may take acetaminophen (Tylenol) for pain.     Call your Doctor if you have:    Severe pain    Signs of infection (warmth, redness, cloudy yellow drainage, and or a bad smell)    Questions or concerns    Contact infromation:  [facility] [phone]  [facility] [phone]

## 2018-03-09 NOTE — MR AVS SNAPSHOT
After Visit Summary   3/9/2018    James Lama    MRN: [id number]           Patient Information     Date Of Birth          6/28/1927        Visit Information        Provider Department      3/9/2018 10:45 AM Catia Loredo MD [facility]        Today's Diagnoses     History of dysplastic nevus    -  1    History of nonmelanoma skin cancer        Actinic keratosis          Care Instructions    CRYOTHERAPY    What is it?    Use of a very cold liquid, such as liquid nitrogen, to freeze and destroy abnormal skin cells that need to be removed    What should I expect?    Tenderness and redness    A small blister that might grow and fill with dark purple blood. There may be crusting.    More than one treatment may be needed if the lesions do not go away.    How do I care for the treated area?    Gently wash the area with your hands when bathing.    Use a thin layer of Vaselin to help with healing. You may use a Band-Aid.     The area should heal within 7-10 days.    Do not use an antibiotic or Neosporin ointment.     You may take acetaminophen (Tylenol) for pain.     Call your Doctor if you have:    Severe pain    Signs of infection (warmth, redness, cloudy yellow drainage, and or a bad smell)    Questions or concerns    Contact infromation:  [facility] [phone]  [facility] [phone]            Follow-ups after your visit        Follow-up notes from your care team     Return in about 1 year (around 3/9/2019).      Your next 10 appointments already scheduled     Mar 08, 2019 11:00 AM CST   Return Visit with Catia Loredo MD   [facility]    [address] [place] [address] [phone]              Who to contact     If you have questions or need follow up information about today's clinic visit or your schedule please contact [facility]  directly at [phone].  Normal or non-critical lab and imaging results will be communicated to you by MyChart, letter or phone within 4 business days after the clinic has received the results. If you do not hear from us within 7 days, please contact the clinic through MyChart or phone. If you have a critical or abnormal lab result, we will notify you by phone as soon as possible.  Submit refill requests through [employer] or call your pharmacy and they will forward the refill request to us. Please allow 3 business days for your refill to be completed.          Additional Information About Your Visit        Care EveryWhere ID     This is your Care EveryWhere ID. This could be used by other organizations to access your [place] medical records  [id number]         Blood Pressure from Last 3 Encounters:   03/08/18 139/63   10/03/17 122/63   09/01/17 136/70    Weight from Last 3 Encounters:   03/08/18 97.1 kg (214 lb)   10/03/17 96.6 kg (213 lb)   09/01/17 97.5 kg (215 lb)              We Performed the Following     DESTRUCT PREMALIGNANT LESION, 2-14     DESTRUCT PREMALIGNANT LESION, FIRST        Primary Care Provider Office Phone # Fax #    Juan Carlos Jansen [phone]       [address] [address] [facility] [address]        Equal Access to Services     ARLIN RAZO : Hadii aad ku hadasho Soomaali, waaxda luqadaha, qaybta kaalmada adeegyada, waxay brett jones. So [facility] [phone].    ATENCIÓN: Si habla español, tiene a londono disposición servicios gratuitos de asistencia lingüística. Llame al [phone].    We comply with applicable federal civil rights laws and [place] laws. We do not discriminate on the basis of race, color, national origin, age, disability, sex, sexual orientation, or gender identity.            Thank you!     Thank you for choosing [facility]  for your care. Our goal is always to provide you with excellent care. Hearing back from our  patients is one way we can continue to improve our services. Please take a few minutes to complete the written survey that you may receive in the mail after your visit with us. Thank you!             Your Updated Medication List - Protect others around you: Learn how to safely use, store and throw away your medicines at www.disposemymeds.org.          This list is accurate as of 3/9/18 11:18 AM.  Always use your most recent med list.                   Brand Name Dispense Instructions for use Diagnosis    amLODIPine 5 MG tablet    NORVASC    90 tablet    Take 1 tablet (5 mg) by mouth daily for blood pressure.    Benign hypertension with chronic kidney disease, stage III       aspirin 81 MG tablet      1 tablet daily*        cyanocobalamin 1000 MCG Tabs      1 tablet daily for foot neuropathy (vitamin B12).        fluticasone 50 MCG/ACT spray    FLONASE    1 Bottle    USE ONE TO TWO SPRAY(S) IN EACH NOSTRIL ONCE DAILY FOR  ALLERGY  PREVENTION    Perennial allergic rhinitis       indomethacin 50 MG capsule    INDOCIN    40 capsule    Take 1 capsule (50 mg) by mouth 3 times daily (with meals) as needed for gouty attack.    Gout       levothyroxine 88 MCG tablet    SYNTHROID/LEVOTHROID    90 tablet    Take 1 tablet (88 mcg) by mouth daily for thyroid.    Hypothyroidism due to acquired atrophy of thyroid       lisinopril 40 MG tablet    PRINIVIL/ZESTRIL    90 tablet    Take 1 tablet (40 mg) by mouth daily for blood pressure.    Benign hypertension with chronic kidney disease, stage III       lovastatin 20 MG tablet    MEVACOR    90 tablet    Take 1 tablet (20 mg) by mouth every evening for cholesterol.    Hyperlipidemia LDL goal <130       metoprolol succinate 25 MG 24 hr tablet    TOPROL-XL    90 tablet    Take 1 tablet daily for blood pressure.    Benign hypertension with chronic kidney disease, stage III       omeprazole 20 MG CR capsule    priLOSEC    180 capsule    Take 2 capsules (40 mg) by mouth daily for reflux.  Take 30-60 minutes before a meal.    Gastroesophageal reflux disease without esophagitis       PRESERVISION AREDS PO      Take 1 capsule by mouth 2 times daily (with meals) (PreserVision AREDS 2)        TYLENOL 500 MG tablet   Generic drug:  acetaminophen      Take 2 tablets by mouth 2 times daily as needed for pain (knee).        VITAMIN D (CHOLECALCIFEROL) PO      Take 1,000 Units by mouth daily

## 2018-07-16 ENCOUNTER — OFFICE VISIT (OUTPATIENT)
Dept: FAMILY MEDICINE | Facility: CLINIC | Age: 83
End: 2018-07-16
Payer: COMMERCIAL

## 2018-07-16 VITALS
BODY MASS INDEX: 32.31 KG/M2 | HEART RATE: 60 BPM | SYSTOLIC BLOOD PRESSURE: 128 MMHG | DIASTOLIC BLOOD PRESSURE: 60 MMHG | OXYGEN SATURATION: 98 % | RESPIRATION RATE: 18 BRPM | WEIGHT: 217.2 LBS | TEMPERATURE: 98.6 F

## 2018-07-16 DIAGNOSIS — I12.9 BENIGN HYPERTENSION WITH CHRONIC KIDNEY DISEASE, STAGE III (H): ICD-10-CM

## 2018-07-16 DIAGNOSIS — R60.0 LOWER EXTREMITY EDEMA: ICD-10-CM

## 2018-07-16 DIAGNOSIS — M25.512 LEFT SHOULDER PAIN, UNSPECIFIED CHRONICITY: Primary | ICD-10-CM

## 2018-07-16 DIAGNOSIS — N18.30 BENIGN HYPERTENSION WITH CHRONIC KIDNEY DISEASE, STAGE III (H): ICD-10-CM

## 2018-07-16 PROCEDURE — 99214 OFFICE O/P EST MOD 30 MIN: CPT | Performed by: FAMILY MEDICINE

## 2018-07-16 RX ORDER — CHLORTHALIDONE 25 MG/1
12.5 TABLET ORAL DAILY
Qty: 45 TABLET | Refills: 0 | Status: SHIPPED | OUTPATIENT
Start: 2018-07-16 | End: 2018-08-20

## 2018-07-16 ASSESSMENT — PAIN SCALES - GENERAL: PAINLEVEL: NO PAIN (0)

## 2018-07-16 NOTE — MR AVS SNAPSHOT
After Visit Summary   7/16/2018    James Lama    MRN: [id number]           Patient Information     Date Of Birth          6/28/1927        Visit Information        Provider Department      7/16/2018 3:40 PM Juan Carlos Jansen MD [facility] Hollie Britt        Today's Diagnoses     Left shoulder pain, unspecified chronicity    -  1    Benign hypertension with chronic kidney disease, stage III        Lower extremity edema           Follow-ups after your visit        Additional Services     ORTHO  REFERRAL       [facility] is referring you to the Orthopedic  Services at [facility].       The  Representative will assist you in the coordination of your Orthopedic and Musculoskeletal Care as prescribed by your physician.    The  Representative will call you within 1 business day to help schedule your appointment, or you may contact the  Representative at:    All areas ~ [phone]     Type of Referral : Surgical / Specialist       Timeframe requested: Routine    Coverage of these services is subject to the terms and limitations of your health insurance plan.  Please call member services at your health plan with any benefit or coverage questions.      If X-rays, CT or MRI's have been performed, please contact the facility where they were done to arrange for , prior to your scheduled appointment.  Please bring this referral request to your appointment and present it to your specialist.                  Follow-up notes from your care team     Return in about 6 weeks (around 8/27/2018) for cholesterol, blood pressure check.      Your next 10 appointments already scheduled     Mar 08, 2019 11:00 AM CST   Return Visit with Catia Loredo MD   [facility] ([facility]    [address] [place] [address] [phone]              Who to contact     If you have  "questions or need follow up information about today's clinic visit or your schedule please contact [facility] LIDIA MEJIAS directly at [phone].  Normal or non-critical lab and imaging results will be communicated to you by MyChart, letter or phone within 4 business days after the clinic has received the results. If you do not hear from us within 7 days, please contact the clinic through [facility] or phone. If you have a critical or abnormal lab result, we will notify you by phone as soon as possible.  Submit refill requests through [employer] or call your pharmacy and they will forward the refill request to us. Please allow 3 business days for your refill to be completed.          Additional Information About Your Visit        [employer] Information     [employer] lets you send messages to your doctor, view your test results, renew your prescriptions, schedule appointments and more. To sign up, go to [email or web address] . Click on \"Log in\" on the left side of the screen, which will take you to the Welcome page. Then click on \"Sign up Now\" on the right side of the page.     You will be asked to enter the access code listed below, as well as some personal information. Please follow the directions to create your username and password.     Your access code is: [email or web address]  Expires: 10/14/2018  4:10 PM     Your access code will  in 90 days. If you need help or a new code, please call your [place] clinic or [phone].        Care EveryWhere ID     This is your Care EveryWhere ID. This could be used by other organizations to access your [place] medical records  [id number]        Your Vitals Were     Pulse Temperature Respirations Pulse Oximetry BMI (Body Mass Index)       60 98.6  F (37  C) (Oral) 18 98% 32.31 kg/m2        Blood Pressure from Last 3 Encounters:   18 128/60   18 139/63   10/03/17 122/63    Weight from Last 3 Encounters:   18 98.5 kg (217 lb 3.2 oz)   18 97.1 kg " (214 lb)   10/03/17 96.6 kg (213 lb)              We Performed the Following     ORTHO  REFERRAL          Today's Medication Changes          These changes are accurate as of 7/16/18  4:10 PM.  If you have any questions, ask your nurse or doctor.               Start taking these medicines.        Dose/Directions    chlorthalidone 25 MG tablet   Commonly known as:  HYGROTON   Used for:  Benign hypertension with chronic kidney disease, stage III   Started by:  Juan Carlos Jansen MD        Dose:  12.5 mg   Take 0.5 tablets (12.5 mg) by mouth daily for blood pressure.   Quantity:  45 tablet   Refills:  0         Stop taking these medicines if you haven't already. Please contact your care team if you have questions.     amLODIPine 5 MG tablet   Commonly known as:  NORVASC   Stopped by:  Juan Carlos Jansen MD                Where to get your medicines      These medications were sent to [facility] [address] [place] [address] [address] [address]     Phone:  [phone]     chlorthalidone 25 MG tablet                Primary Care Provider Office Phone # Fax #    Juan Carlos Jansen [phone]       [address] [place]  [facility] [address]        Equal Access to Services     PROSPER RAZO AH: Hadii aad ku hadasho Soomaali, waaxda luqadaha, qaybta kaalmada adeegyada, waxay idiin hayaan adeeg kharaami laan jones. So [facility] [phone].    ATENCIÓN: Si habla español, tiene a londono disposición servicios gratuitos de asistencia lingüística. Llame al [phone].    We comply with applicable federal civil rights laws and [place] laws. We do not discriminate on the basis of race, color, national origin, age, disability, sex, sexual orientation, or gender identity.            Thank you!     Thank you for choosing [facility]  for your care. Our goal is always to provide you with excellent care. Hearing back from our patients is one way we can  continue to improve our services. Please take a few minutes to complete the written survey that you may receive in the mail after your visit with us. Thank you!             Your Updated Medication List - Protect others around you: Learn how to safely use, store and throw away your medicines at www.disposemymeds.org.          This list is accurate as of 7/16/18  4:10 PM.  Always use your most recent med list.                   Brand Name Dispense Instructions for use Diagnosis    aspirin 81 MG tablet      1 tablet daily*        chlorthalidone 25 MG tablet    HYGROTON    45 tablet    Take 0.5 tablets (12.5 mg) by mouth daily for blood pressure.    Benign hypertension with chronic kidney disease, stage III       cyanocobalamin 1000 MCG Tabs      1 tablet daily for foot neuropathy (vitamin B12).        fluticasone 50 MCG/ACT spray    FLONASE    1 Bottle    USE ONE TO TWO SPRAY(S) IN EACH NOSTRIL ONCE DAILY FOR  ALLERGY  PREVENTION    Perennial allergic rhinitis       indomethacin 50 MG capsule    INDOCIN    40 capsule    Take 1 capsule (50 mg) by mouth 3 times daily (with meals) as needed for gouty attack.    Gout       levothyroxine 88 MCG tablet    SYNTHROID/LEVOTHROID    90 tablet    Take 1 tablet (88 mcg) by mouth daily for thyroid.    Hypothyroidism due to acquired atrophy of thyroid       lisinopril 40 MG tablet    PRINIVIL/ZESTRIL    90 tablet    Take 1 tablet (40 mg) by mouth daily for blood pressure.    Benign hypertension with chronic kidney disease, stage III       lovastatin 20 MG tablet    MEVACOR    90 tablet    Take 1 tablet (20 mg) by mouth every evening for cholesterol.    Hyperlipidemia LDL goal <130       metoprolol succinate 25 MG 24 hr tablet    TOPROL-XL    90 tablet    Take 1 tablet daily for blood pressure.    Benign hypertension with chronic kidney disease, stage III       omeprazole 20 MG CR capsule    priLOSEC    180 capsule    Take 2 capsules (40 mg) by mouth daily for reflux. Take 30-60  minutes before a meal.    Gastroesophageal reflux disease without esophagitis       PRESERVISION AREDS PO      Take 1 capsule by mouth 2 times daily (with meals) (PreserVision AREDS 2)        TYLENOL 500 MG tablet   Generic drug:  acetaminophen      Take 2 tablets by mouth 2 times daily as needed for pain (knee).        VITAMIN D (CHOLECALCIFEROL) PO      Take 1,000 Units by mouth daily

## 2018-07-16 NOTE — PROGRESS NOTES
SUBJECTIVE:   James Lama is a 89 year old male who presents to clinic today for the following health issues:  He is accompanied by his wife.     Musculoskeletal problem/pain      Duration: 1 year ago    Description  Location: left shoulder    Intensity: mild    Accompanying signs and symptoms: none    History  Previous similar problem: YES  Previous evaluation:  x-ray    Precipitating or alleviating factors:  Trauma or overuse: YES fell about 1 year ago   Aggravating factors include: lying of the left shoulder at night     Therapies tried and outcome: acetaminophen    Lower Extremity Swelling    Onset: 2 years  Description: swelling of both legs. Compression stockings provide moderate relief.  His wife had similar issues with lower extremity edema, which resolved once she stopped taking amlodipine.    Past medical, family, and social histories, medications, and allergies are reviewed and updated in [facility].     ROS:  CONSTITUTIONAL: NEGATIVE for fever, chills, change in weight  ENT/MOUTH: NEGATIVE for ear, mouth and throat problems  RESP: NEGATIVE for significant cough or SOB  CV: NEGATIVE for chest pain or palpitations  ROS otherwise negative    This document serves as a record of the services and decisions personally performed and made by Dr. Jansen. It was created on his behalf by Osiris Mendez, a trained medical scribe. The creation of this document is based the provider's statements to the medical scribe.  Osiris Mendez July 16, 2018 3:55 PM     OBJECTIVE:                                                    /60 (BP Location: Left arm, Patient Position: Chair, Cuff Size: Adult Large)  Pulse 60  Temp 98.6  F (37  C) (Oral)  Resp 18  Wt 98.5 kg (217 lb 3.2 oz)  SpO2 98%  BMI 32.31 kg/m2   Body mass index is 32.31 kg/(m^2).     GENERAL: healthy, alert and no distress  EYES: Eyes grossly normal to inspection, PERRL, EOMI, sclerae white and conjunctivae normal  MS: no gross musculoskeletal defects noted,  no significant edema (wearing compression stockings). Mild tenderness where he is symptomatic which is the posterior part of the let shoulder joint. There is no tenderness with palpation laterally, over the supraspinatus muscle, or over the AC joint  SKIN: no suspicious lesions or rashes  NEURO: Normal strength and tone, sensory exam grossly normal, mentation intact, oriented times 3 and cranial nerves 2-12 intact  PSYCH: mentation appears normal, affect normal/bright     Diagnostic Test Results:  none      ASSESSMENT/PLAN:                                                      (M25.512) Left shoulder pain, unspecified chronicity  (primary encounter diagnosis)  Comment: multiple potential causes including known DJD, trauma from his fall 16 months ago, non-traumatic rotator cuff tear. He might benefit from a steroid injection  Plan: ORTHO  REFERRAL          (I12.9,  N18.3) Benign hypertension with chronic kidney disease, stage III  Comment: well controlled on current regimen, but we are stopping amlodipine for suspected edema side effect  Plan: chlorthalidone (HYGROTON) 25 MG tablet        Add chlorthalidone 12.5 mg. Return in about 6 weeks (around 8/27/2018) for cholesterol, blood pressure check.     (R60.0) Lower extremity edema  Comment: from [facility]?  Plan: stop amlodipine. DC compression stockings 1 week later. If his leg swelling persists, I would blame it on venous insufficiency and consider putting him back on amlodipine     The information in this document, created by the medical scribe for me, accurately reflects the services I personally performed and the decisions made by me. I have reviewed and approved this document for accuracy prior to leaving the patient care area. July 16, 2018 3:55 PM     Juan Carlos Jansen MD

## 2018-07-25 ENCOUNTER — RADIANT APPOINTMENT (OUTPATIENT)
Dept: GENERAL RADIOLOGY | Facility: CLINIC | Age: 83
End: 2018-07-25
Attending: ORTHOPAEDIC SURGERY
Payer: COMMERCIAL

## 2018-07-25 ENCOUNTER — OFFICE VISIT (OUTPATIENT)
Dept: ORTHOPEDICS | Facility: CLINIC | Age: 83
End: 2018-07-25
Payer: COMMERCIAL

## 2018-07-25 VITALS
WEIGHT: 217.6 LBS | SYSTOLIC BLOOD PRESSURE: 163 MMHG | HEART RATE: 70 BPM | BODY MASS INDEX: 32.23 KG/M2 | HEIGHT: 69 IN | DIASTOLIC BLOOD PRESSURE: 70 MMHG

## 2018-07-25 DIAGNOSIS — M25.512 CHRONIC LEFT SHOULDER PAIN: ICD-10-CM

## 2018-07-25 DIAGNOSIS — G89.29 CHRONIC LEFT SHOULDER PAIN: Primary | ICD-10-CM

## 2018-07-25 DIAGNOSIS — G89.29 CHRONIC LEFT SHOULDER PAIN: ICD-10-CM

## 2018-07-25 DIAGNOSIS — M25.512 CHRONIC LEFT SHOULDER PAIN: Primary | ICD-10-CM

## 2018-07-25 DIAGNOSIS — M75.42 IMPINGEMENT SYNDROME OF SHOULDER REGION, LEFT: ICD-10-CM

## 2018-07-25 PROCEDURE — 20610 DRAIN/INJ JOINT/BURSA W/O US: CPT | Mod: LT | Performed by: ORTHOPAEDIC SURGERY

## 2018-07-25 PROCEDURE — 73030 X-RAY EXAM OF SHOULDER: CPT | Mod: LT

## 2018-07-25 PROCEDURE — 99203 OFFICE O/P NEW LOW 30 MIN: CPT | Mod: 25 | Performed by: ORTHOPAEDIC SURGERY

## 2018-07-25 RX ORDER — LIDOCAINE HYDROCHLORIDE 10 MG/ML
4 INJECTION, SOLUTION INFILTRATION; PERINEURAL
Status: DISCONTINUED | OUTPATIENT
Start: 2018-07-25 | End: 2019-02-21

## 2018-07-25 RX ORDER — BUPIVACAINE HYDROCHLORIDE 2.5 MG/ML
3 INJECTION, SOLUTION INFILTRATION; PERINEURAL
Status: DISCONTINUED | OUTPATIENT
Start: 2018-07-25 | End: 2019-02-15

## 2018-07-25 RX ORDER — TRIAMCINOLONE ACETONIDE 40 MG/ML
80 INJECTION, SUSPENSION INTRA-ARTICULAR; INTRAMUSCULAR
Status: DISCONTINUED | OUTPATIENT
Start: 2018-07-25 | End: 2019-02-21

## 2018-07-25 RX ADMIN — LIDOCAINE HYDROCHLORIDE 4 ML: 10 INJECTION, SOLUTION INFILTRATION; PERINEURAL at 14:16

## 2018-07-25 RX ADMIN — TRIAMCINOLONE ACETONIDE 80 MG: 40 INJECTION, SUSPENSION INTRA-ARTICULAR; INTRAMUSCULAR at 14:16

## 2018-07-25 RX ADMIN — BUPIVACAINE HYDROCHLORIDE 3 ML: 2.5 INJECTION, SOLUTION INFILTRATION; PERINEURAL at 14:16

## 2018-07-25 ASSESSMENT — PAIN SCALES - GENERAL: PAINLEVEL: MODERATE PAIN (4)

## 2018-07-25 NOTE — PROGRESS NOTES
CHIEF COMPLAINT:   Chief Complaint   Patient presents with     Left Shoulder - Pain     Onset: 4/2017. Patient states he fell and landed on left shoulder. Pain has gotten worse. Pain is all over the shoulder. He has pain when he is laying in bed. No loss of strength. Denies any N/T in the arm. He has had shoulder pain years ago.      Shoulder Pain     He has had a cortisone injection many years ago but has not had problems until he fell.      James Lama is seen today in the [facility] for evaluation of left shoulder pain at the request of Dr. Juan Carlos Jansen    HISTORY:  James Lama is a 89 year old male, right -hand dominant, who is seen for left shoulder pain that started years ago, ~20 years ago. He fell 4/2017 and fell onto the left shoulder. Today he has moderate pain, rated a 4/10. Pain is located over the lateral shoulder and upper arm. He has pain at rest. He does not have much pain with activities. Pain is at rest, he will notice aching. Denies weakness. Denies numbness and tingling, denies neck pain. He has not had any treatment recently. Has previously had an injection into the left shoulder for bursitis in the past, which worked well. Presents with his wife today.    Of note: also history of right shoulder bursitis.      Onset: moderate pain,   Symptoms have not improved since that time.  Aggravated by: at rest  Relieved by: cortisone injection many years ago  Present symptoms: pain with prolonged rest  Pain location: lateral shoulder  Pain severity: 4/10  Pain quality: aching  Frequency of symptoms: frequently  Associated symptoms: none    Treatment up to this point: cortisone injection years ago.  Has not tried: surgery and PT  Prior history of related problems: previous history of shoulder bursitis, improved with cortisone injection     Significant Orthopedic past medical history: history of bilateral shoulder bursitis  Usual level of recreational activity:  sedentary  Usual level of work activity: retired    Other PMH:  has a past medical history of Actinic keratosis; Arthritis; Basal cell carcinoma (Pre-2009); BPH; Diverticulosis (1/02); Erectile dysfunction; Essential hypertension, benign; Gout; Hyperlipidemia LDL goal <130; Hypothyroidism; KESHA (obstructive sleep apnoea); Perennial allergic rhinitis; Prostate cancer (H) (1/04); and Recurrent BCC (basal cell carcinoma). He also has no past medical history of Acne vulgaris; Allergies; Amblyopia; Cataract; Diabetes mellitus (H); Diabetic retinopathy (H); Eczema; Glaucoma (increased eye pressure); Heart valve disorder; Malignant melanoma nos; Pacemaker; Photosensitive contact dermatitis; Psoriasis; Retinal detachment; Senile macular degeneration; Squamous cell carcinoma; Strabismus; Type II or unspecified type diabetes mellitus without mention of complication, not stated as uncontrolled; Unspecified asthma(493.90); Urticaria; or Uveitis.  Patient Active Problem List    Diagnosis Date Noted     Cognitive impairment, mild, so stated 03/08/2018     Priority: Medium     Perennial allergic rhinitis, unspecified allergic rhinitis trigger 01/27/2017     Priority: Medium     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 12/14/2015     Priority: Medium     Chronic kidney disease, stage 3 (moderate) 11/05/2015     Priority: Medium     Cortical cataract of both eyes 08/05/2015     Priority: Medium     Nuclear sclerosis of both eyes 08/05/2015     Priority: Medium     Recurrent falls 06/03/2014     Priority: Medium     Obesity, Class I, BMI 30-34.9 11/04/2013     Priority: Medium     Actinic keratosis 01/24/2013     Priority: Medium     Health Care Home 12/14/2012     Priority: Medium     Codi Boykin RN-PHN  FPA / [facility]   [phone]    DX V65.8 REPLACED WITH [facility] (04/08/2013)       Paresthesias/numbness, both feet 12/05/2011     Priority: Medium     Vitamin B12 deficiency (non anemic)  12/05/2011     Priority: Medium     Advance care planning 11/10/2011     Priority: Medium     Advance Care Planning 11/10/2011: Discussed advance care planning with patient; information given to patient to review. 11/10/2011.DELMY Maier MA    Discussed advance care planning with patient; information given to patient to review. January 27, 2017 Juan Carlos Jansen MD     Discussed advance care planning with patient; however, patient declined at this time. February 16, 2017, Anatoliy Gutierrez MA             History of basal cell carcinoma 08/01/2011     Priority: Medium     recurrent       Right inguinal hernia 02/28/2011     Priority: Medium     On CT scan 2/28/11       Hypertension goal BP (blood pressure) < 140/90 01/06/2011     Priority: Medium     GERD (gastroesophageal reflux disease) 02/02/2010     Priority: Medium     Prostate cancer (H)      Priority: Medium     s/p XRT 2005Dr. Mcmanus       Hyperlipidemia LDL goal <130      Priority: Medium     Benign hypertension with chronic kidney disease, stage III      Priority: Medium     KESHA (obstructive sleep apnea)      Priority: Medium     Gout      Priority: Medium     Diverticulosis      Priority: Medium     Perennial allergic rhinitis      Priority: Medium     dust       Hypothyroidism due to acquired atrophy of thyroid      Priority: Medium     Erectile dysfunction      Priority: Medium       Surgical Hx:  has a past surgical history that includes hernia repair, inguinal rt/lt (~1988); exc skin malig 1.1-2cm trunk,arm,leg (8/06); exc skin malig 0.6-1cm face,facial (5/09); exc skin malig 0.5cm or less,facial (5/09); and exc skin malig 1.1-2cm face,facial (10/09).    Medications:   Current Outpatient Prescriptions:      acetaminophen (TYLENOL) 500 MG tablet, Take 2 tablets by mouth 2 times daily as needed for pain (knee)., Disp: , Rfl: 0     ASPIRIN 81 MG OR TABS, 1 tablet daily*, Disp: , Rfl:      chlorthalidone (HYGROTON) 25 MG tablet, Take 0.5 tablets (12.5 mg) by mouth  daily for blood pressure., Disp: 45 tablet, Rfl: 0     Cyanocobalamin 1000 MCG TABS, 1 tablet daily for foot neuropathy (vitamin B12)., Disp: , Rfl:      fluticasone (FLONASE) 50 MCG/ACT spray, USE ONE TO TWO SPRAY(S) IN EACH NOSTRIL ONCE DAILY FOR  ALLERGY  PREVENTION, Disp: 1 Bottle, Rfl: 1     indomethacin (INDOCIN) 50 MG capsule, Take 1 capsule (50 mg) by mouth 3 times daily (with meals) as needed for gouty attack., Disp: 40 capsule, Rfl: 1     levothyroxine (SYNTHROID/LEVOTHROID) 88 MCG tablet, Take 1 tablet (88 mcg) by mouth daily for thyroid., Disp: 90 tablet, Rfl: 1     lisinopril (PRINIVIL/ZESTRIL) 40 MG tablet, Take 1 tablet (40 mg) by mouth daily for blood pressure., Disp: 90 tablet, Rfl: 1     lovastatin (MEVACOR) 20 MG tablet, Take 1 tablet (20 mg) by mouth every evening for cholesterol., Disp: 90 tablet, Rfl: 1     metoprolol succinate (TOPROL-XL) 25 MG 24 hr tablet, Take 1 tablet daily for blood pressure., Disp: 90 tablet, Rfl: 1     Multiple Vitamins-Minerals (PRESERVISION AREDS PO), Take 1 capsule by mouth 2 times daily (with meals) (PreserVision AREDS 2), Disp: , Rfl:      omeprazole (PRILOSEC) 20 MG CR capsule, Take 2 capsules (40 mg) by mouth daily for reflux. Take 30-60 minutes before a meal., Disp: 180 capsule, Rfl: 3     VITAMIN D, CHOLECALCIFEROL, PO, Take 1,000 Units by mouth daily , Disp: , Rfl:     Allergies:   Allergies   Allergen Reactions     Acrylate Copolymer Rash     topical       Social Hx: artist.  reports that he has never smoked. He has never used smokeless tobacco. He reports that he does not drink alcohol or use illicit drugs.    Family Hx: family history includes Cancer in his father; Myocardial Infarction in his sister; Thyroid Disease in his sister. There is no history of Diabetes, Hypertension, Cerebrovascular Disease, Glaucoma, or Macular Degeneration..    REVIEW OF SYSTEMS: 10 point ROS neg other than the symptoms noted above in the HPI and PMH. Notables  "include  CONSTITUTIONAL:NEGATIVE for fever, chills, change in weight  INTEGUMENTARY/SKIN: NEGATIVE for worrisome rashes, moles or lesions  MUSCULOSKELETAL:See HPI above  NEURO: NEGATIVE for weakness, dizziness or paresthesias    This document serves as a record of the services and decisions personally performed and made by Rico Valladares MD. It was created on his behalf by Charline Enriquez, a trained medical scribe. The creation of this document is based the provider's statements to the medical scribe.    Scribe Charline Enriquez 1:38 PM 7/25/2018    PHYSICAL EXAM:  /70  Pulse 70  Ht 5' 8.75\" (1.746 m)  Wt 217 lb 9.6 oz (98.7 kg)  BMI 32.37 kg/m2   GENERAL APPEARANCE: healthy, alert, no distress; accompanied by his wife.  SKIN: no suspicious lesions or rashes  NEURO: Normal strength and tone, mentation intact and speech normal  PSYCH:  mentation appears normal and affect normal, not anxious  RESPIRATORY: No increased work of breathing.  VASCULAR: Radial pulses 2+ and brisk cappillary refill     MUSCULOSKELETAL:    NECK:  Cervical range of motion: full, painfree, and does not cause shoulder pain or reproduce shoulder pain.  Posterior cervical spine nontender to palpation over midline bony prominences  There is no tenderness to palpation along neck paraspinals and trapezius muscles  No palpable cervical lymphadenopathy.    RIGHT UPPER EXTREMITY:  Sensation intact to light touch in median, radial, ulnar and axillary nerve distributions  Palpable 2+ radial pulse, brisk capillary refill to all fingers, wwp  Intact epl fpl fdp edc wrist flexion/extension biceps triceps deltoid    RIGHT SHOULDER:  Shoulder Inspection: no swelling, bruising, discoloration, or obvious deformity or asymmetry  Tender: nontender to palpation   Range of Motion:   Active: forward flexion 170 degrees, external rotation 60 degrees  Strength: forward flexion 5/5, External rotation 5/5   Impingement: negative   Special tests: Empty can: negative, belly " press: negative     LEFT UPPER EXTREMITY:  Sensation intact to light touch in median, radial, ulnar and axillary nerve distributions  Palpable 2+ radial pulse, brisk capillary refill to all fingers, wwp  Intact epl fpl fdp edc wrist flexion/extension biceps triceps deltoid    LEFT SHOULDER:  Shoulder Inspection: no swelling, bruising, discoloration, or obvious deformity or asymmetry  Tender: nontender to palpation   Range of Motion:   Active: forward flexion 170 degrees, external rotation 60 degrees  Strength: forward flexion 5-/5, External rotation 4+/5  Impingement: all grade 2 positive  Special tests: Empty can: negative, Belly press: negative,     X-RAY INTERPRETATION: 3 views left shoulder obtained 7/25/2018 were reviewed personally in clinic today with the patient. On my review, No obvious fracture or dislocation. No obvious bony abnormality or lesion. Moderate acromio-clavicular and mild gleno-humeral degenerative changes. Sclerotic and cystic changes of the greater tuberosity.    ASSESSMENT: James Lama is a 89 year old male, right -hand dominant with left shoulder pain, shoulder impingement syndrome, bursitis, and tendonitis    PLAN:   * Reviewed imaging studies with patient. Also, clinical exam findings. Consistent with left shoulder shoulder impingement syndrome, bursitis and tendonitis.  * overall strength feels intact, so I don't suspect a large rotator cuff tear.    Treatment:    * Rest  * Activity modification - avoid activities that aggravate symptoms or started symptoms at onset.  * NSAIDS - regular use for inflammation, with food, as long as no contra-indications. Please discuss with pcp if needed.  * Ice twice daily to three times daily, 15-20 minutes at a time  * heat may be beneficial prior to exercising  * Physical Therapy for strengthening, stretching and range of motion exercises of rotator cuff and periscapular stabilization.  * Tylenol as needed for pain  * Injections: cortisone  injections may be beneficial to help decrease swelling and inflammation within the shoulder or bursa, and decrease pain. With decreased pain, Physical Therapy and exercises will be more effective and efficient. Patient elected to proceed.  * Return to clinic as needed   * consider MRI of the shoulder in future if symptoms persist despite the above regimen of treatment.        PROCEDURE NOTE:  The risks, perceived benefits and potential complications (including but not limited to: bleeding, infection, pain, scar, damage to adjacent structures, atrophy or necrosis of soft tissue, skin blanching, failure to relieve symptoms, worsening of symptoms, allergic reaction) of injection were discussed with the patient. Questions were addressed and answered.The patient elected to proceed. Written informed consent was obtained. The correct procedural site was identified and confirmed. A LEFT shoulder subacromial injection was performed using 2mL Kenalog-40 40mg per mL and 7mL (4mL 1% lidocaine, 3mL 0.25% marcaine)  of local anesthetic after sterile prep, to the correct procedural site. Sterile bandaid applied. This was tolerated well by the patient. No apparent complications. Did also discuss that if diabetic, recommend close monitoring of blood sugars over the next week as cortisone injections can temporarily elevate blood sugars.       The information in this document, created by a scribe for me, accurately reflects the services I personally performed and the decisions made by me. I have reviewed and approved this document for accuracy.     Rico Valladares M.D., M.S.  Dept. of Orthopaedic Surgery  [facility]    Large Joint Injection/Arthocentesis  Date/Time: 7/25/2018 2:16 PM  Performed by: SONIA MARTINEZ  Authorized by: RICO VALLADARES ROYAL     Indications:  Pain  Indications comment:  Impingement  Needle Size:  22 G  Approach:  Posterolateral  Location:  Shoulder  Site:  L subacromial bursa  Medications:  80  mg triamcinolone acetonide 40 MG/ML; 3 mL bupivacaine 0.25 %; 4 mL lidocaine 1 %  Outcome:  Tolerated well, no immediate complications  Procedure discussed: discussed risks, benefits, and alternatives    Consent Given by:  Patient  Prep: patient was prepped and draped in usual sterile fashion

## 2018-07-25 NOTE — LETTER
7/25/2018         RE: James Lama  [address]  [facility] [address]        Dear Colleague,    Thank you for referring your patient, James Lama, to the [facility]. Please see a copy of my visit note below.    CHIEF COMPLAINT:   Chief Complaint   Patient presents with     Left Shoulder - Pain     Onset: 4/2017. Patient states he fell and landed on left shoulder. Pain has gotten worse. Pain is all over the shoulder. He has pain when he is laying in bed. No loss of strength. Denies any N/T in the arm. He has had shoulder pain years ago.      Shoulder Pain     He has had a cortisone injection many years ago but has not had problems until he fell.      James Lama is seen today in the [facility] for evaluation of left shoulder pain at the request of Dr. Juan Carlos Jansen    HISTORY:  James Lama is a 89 year old male, right -hand dominant, who is seen for left shoulder pain that started years ago, ~20 years ago. He fell 4/2017 and fell onto the left shoulder. Today he has moderate pain, rated a 4/10. Pain is located over the lateral shoulder and upper arm. He has pain at rest. He does not have much pain with activities. Pain is at rest, he will notice aching. Denies weakness. Denies numbness and tingling, denies neck pain. He has not had any treatment recently. Has previously had an injection into the left shoulder for bursitis in the past, which worked well. Presents with his wife today.    Of note: also history of right shoulder bursitis.      Onset: moderate pain,   Symptoms have not improved since that time.  Aggravated by: at rest  Relieved by: cortisone injection many years ago  Present symptoms: pain with prolonged rest  Pain location: lateral shoulder  Pain severity: 4/10  Pain quality: aching  Frequency of symptoms: frequently  Associated symptoms: none    Treatment up to this point: cortisone injection years ago.  Has not tried:  surgery and PT  Prior history of related problems: previous history of shoulder bursitis, improved with cortisone injection     Significant Orthopedic past medical history: history of bilateral shoulder bursitis  Usual level of recreational activity: sedentary  Usual level of work activity: retired    Other PMH:  has a past medical history of Actinic keratosis; Arthritis; Basal cell carcinoma (Pre-2009); BPH; Diverticulosis (1/02); Erectile dysfunction; Essential hypertension, benign; Gout; Hyperlipidemia LDL goal <130; Hypothyroidism; KESHA (obstructive sleep apnoea); Perennial allergic rhinitis; Prostate cancer (H) (1/04); and Recurrent BCC (basal cell carcinoma). He also has no past medical history of Acne vulgaris; Allergies; Amblyopia; Cataract; Diabetes mellitus (H); Diabetic retinopathy (H); Eczema; Glaucoma (increased eye pressure); Heart valve disorder; Malignant melanoma nos; Pacemaker; Photosensitive contact dermatitis; Psoriasis; Retinal detachment; Senile macular degeneration; Squamous cell carcinoma; Strabismus; Type II or unspecified type diabetes mellitus without mention of complication, not stated as uncontrolled; Unspecified asthma(493.90); Urticaria; or Uveitis.  Patient Active Problem List    Diagnosis Date Noted     Cognitive impairment, mild, so stated 03/08/2018     Priority: Medium     Perennial allergic rhinitis, unspecified allergic rhinitis trigger 01/27/2017     Priority: Medium     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 12/14/2015     Priority: Medium     Chronic kidney disease, stage 3 (moderate) 11/05/2015     Priority: Medium     Cortical cataract of both eyes 08/05/2015     Priority: Medium     Nuclear sclerosis of both eyes 08/05/2015     Priority: Medium     Recurrent falls 06/03/2014     Priority: Medium     Obesity, Class I, BMI 30-34.9 11/04/2013     Priority: Medium     Actinic keratosis 01/24/2013     Priority: Medium     Health Care Home 12/14/2012     Priority: Medium      Codi Boykin RN-PHN  FPA / [facility]   [phone]    DX V65.8 REPLACED WITH [facility] (04/08/2013)       Paresthesias/numbness, both feet 12/05/2011     Priority: Medium     Vitamin B12 deficiency (non anemic) 12/05/2011     Priority: Medium     Advance care planning 11/10/2011     Priority: Medium     Advance Care Planning 11/10/2011: Discussed advance care planning with patient; information given to patient to review. 11/10/2011.DELMY Maier MA    Discussed advance care planning with patient; information given to patient to review. January 27, 2017 Juan Carlos Jansen MD     Discussed advance care planning with patient; however, patient declined at this time. February 16, 2017, Anatoliy Gutierrez MA             History of basal cell carcinoma 08/01/2011     Priority: Medium     recurrent       Right inguinal hernia 02/28/2011     Priority: Medium     On CT scan 2/28/11       Hypertension goal BP (blood pressure) < 140/90 01/06/2011     Priority: Medium     GERD (gastroesophageal reflux disease) 02/02/2010     Priority: Medium     Prostate cancer (H)      Priority: Medium     s/p XRT 2005, Dr. Mcmanus       Hyperlipidemia LDL goal <130      Priority: Medium     Benign hypertension with chronic kidney disease, stage III      Priority: Medium     KESHA (obstructive sleep apnea)      Priority: Medium     Gout      Priority: Medium     Diverticulosis      Priority: Medium     Perennial allergic rhinitis      Priority: Medium     dust       Hypothyroidism due to acquired atrophy of thyroid      Priority: Medium     Erectile dysfunction      Priority: Medium       Surgical Hx:  has a past surgical history that includes hernia repair, inguinal rt/lt (~1988); exc skin malig 1.1-2cm trunk,arm,leg (8/06); exc skin malig 0.6-1cm face,facial (5/09); exc skin malig 0.5cm or less,facial (5/09); and exc skin malig 1.1-2cm face,facial (10/09).    Medications:   Current Outpatient Prescriptions:       acetaminophen (TYLENOL) 500 MG tablet, Take 2 tablets by mouth 2 times daily as needed for pain (knee)., Disp: , Rfl: 0     ASPIRIN 81 MG OR TABS, 1 tablet daily*, Disp: , Rfl:      chlorthalidone (HYGROTON) 25 MG tablet, Take 0.5 tablets (12.5 mg) by mouth daily for blood pressure., Disp: 45 tablet, Rfl: 0     Cyanocobalamin 1000 MCG TABS, 1 tablet daily for foot neuropathy (vitamin B12)., Disp: , Rfl:      fluticasone (FLONASE) 50 MCG/ACT spray, USE ONE TO TWO SPRAY(S) IN EACH NOSTRIL ONCE DAILY FOR  ALLERGY  PREVENTION, Disp: 1 Bottle, Rfl: 1     indomethacin (INDOCIN) 50 MG capsule, Take 1 capsule (50 mg) by mouth 3 times daily (with meals) as needed for gouty attack., Disp: 40 capsule, Rfl: 1     levothyroxine (SYNTHROID/LEVOTHROID) 88 MCG tablet, Take 1 tablet (88 mcg) by mouth daily for thyroid., Disp: 90 tablet, Rfl: 1     lisinopril (PRINIVIL/ZESTRIL) 40 MG tablet, Take 1 tablet (40 mg) by mouth daily for blood pressure., Disp: 90 tablet, Rfl: 1     lovastatin (MEVACOR) 20 MG tablet, Take 1 tablet (20 mg) by mouth every evening for cholesterol., Disp: 90 tablet, Rfl: 1     metoprolol succinate (TOPROL-XL) 25 MG 24 hr tablet, Take 1 tablet daily for blood pressure., Disp: 90 tablet, Rfl: 1     Multiple Vitamins-Minerals (PRESERVISION AREDS PO), Take 1 capsule by mouth 2 times daily (with meals) (PreserVision AREDS 2), Disp: , Rfl:      omeprazole (PRILOSEC) 20 MG CR capsule, Take 2 capsules (40 mg) by mouth daily for reflux. Take 30-60 minutes before a meal., Disp: 180 capsule, Rfl: 3     VITAMIN D, CHOLECALCIFEROL, PO, Take 1,000 Units by mouth daily , Disp: , Rfl:     Allergies:   Allergies   Allergen Reactions     Acrylate Copolymer Rash     topical       Social Hx: artist.  reports that he has never smoked. He has never used smokeless tobacco. He reports that he does not drink alcohol or use illicit drugs.    Family Hx: family history includes Cancer in his father; Myocardial Infarction in his sister;  "Thyroid Disease in his sister. There is no history of Diabetes, Hypertension, Cerebrovascular Disease, Glaucoma, or Macular Degeneration..    REVIEW OF SYSTEMS: 10 point ROS neg other than the symptoms noted above in the HPI and PMH. Notables include  CONSTITUTIONAL:NEGATIVE for fever, chills, change in weight  INTEGUMENTARY/SKIN: NEGATIVE for worrisome rashes, moles or lesions  MUSCULOSKELETAL:See HPI above  NEURO: NEGATIVE for weakness, dizziness or paresthesias    This document serves as a record of the services and decisions personally performed and made by Rico Valladares MD. It was created on his behalf by Charline Enriquez, a trained medical scribe. The creation of this document is based the provider's statements to the medical scribe.    Scribe Charline Enriquez 1:38 PM 7/25/2018    PHYSICAL EXAM:  /70  Pulse 70  Ht 5' 8.75\" (1.746 m)  Wt 217 lb 9.6 oz (98.7 kg)  BMI 32.37 kg/m2   GENERAL APPEARANCE: healthy, alert, no distress; accompanied by his wife.  SKIN: no suspicious lesions or rashes  NEURO: Normal strength and tone, mentation intact and speech normal  PSYCH:  mentation appears normal and affect normal, not anxious  RESPIRATORY: No increased work of breathing.  VASCULAR: Radial pulses 2+ and brisk cappillary refill     MUSCULOSKELETAL:    NECK:  Cervical range of motion: full, painfree, and does not cause shoulder pain or reproduce shoulder pain.  Posterior cervical spine nontender to palpation over midline bony prominences  There is no tenderness to palpation along neck paraspinals and trapezius muscles  No palpable cervical lymphadenopathy.    RIGHT UPPER EXTREMITY:  Sensation intact to light touch in median, radial, ulnar and axillary nerve distributions  Palpable 2+ radial pulse, brisk capillary refill to all fingers, wwp  Intact epl fpl fdp edc wrist flexion/extension biceps triceps deltoid    RIGHT SHOULDER:  Shoulder Inspection: no swelling, bruising, discoloration, or obvious deformity or " asymmetry  Tender: nontender to palpation   Range of Motion:   Active: forward flexion 170 degrees, external rotation 60 degrees  Strength: forward flexion 5/5, External rotation 5/5   Impingement: negative   Special tests: Empty can: negative, belly press: negative     LEFT UPPER EXTREMITY:  Sensation intact to light touch in median, radial, ulnar and axillary nerve distributions  Palpable 2+ radial pulse, brisk capillary refill to all fingers, wwp  Intact epl fpl fdp edc wrist flexion/extension biceps triceps deltoid    LEFT SHOULDER:  Shoulder Inspection: no swelling, bruising, discoloration, or obvious deformity or asymmetry  Tender: nontender to palpation   Range of Motion:   Active: forward flexion 170 degrees, external rotation 60 degrees  Strength: forward flexion 5-/5, External rotation 4+/5  Impingement: all grade 2 positive  Special tests: Empty can: negative, Belly press: negative,     X-RAY INTERPRETATION: 3 views left shoulder obtained 7/25/2018 were reviewed personally in clinic today with the patient. On my review, No obvious fracture or dislocation. No obvious bony abnormality or lesion. Moderate acromio-clavicular and mild gleno-humeral degenerative changes. Sclerotic and cystic changes of the greater tuberosity.    ASSESSMENT: James Lama is a 89 year old male, right -hand dominant with left shoulder pain, shoulder impingement syndrome, bursitis, and tendonitis    PLAN:   * Reviewed imaging studies with patient. Also, clinical exam findings. Consistent with left shoulder shoulder impingement syndrome, bursitis and tendonitis.  * overall strength feels intact, so I don't suspect a large rotator cuff tear.    Treatment:    * Rest  * Activity modification - avoid activities that aggravate symptoms or started symptoms at onset.  * NSAIDS - regular use for inflammation, with food, as long as no contra-indications. Please discuss with pcp if needed.  * Ice twice daily to three times daily, 15-20  minutes at a time  * heat may be beneficial prior to exercising  * Physical Therapy for strengthening, stretching and range of motion exercises of rotator cuff and periscapular stabilization.  * Tylenol as needed for pain  * Injections: cortisone injections may be beneficial to help decrease swelling and inflammation within the shoulder or bursa, and decrease pain. With decreased pain, Physical Therapy and exercises will be more effective and efficient. Patient elected to proceed.  * Return to clinic as needed   * consider MRI of the shoulder in future if symptoms persist despite the above regimen of treatment.        PROCEDURE NOTE:  The risks, perceived benefits and potential complications (including but not limited to: bleeding, infection, pain, scar, damage to adjacent structures, atrophy or necrosis of soft tissue, skin blanching, failure to relieve symptoms, worsening of symptoms, allergic reaction) of injection were discussed with the patient. Questions were addressed and answered.The patient elected to proceed. Written informed consent was obtained. The correct procedural site was identified and confirmed. A LEFT shoulder subacromial injection was performed using 2mL Kenalog-40 40mg per mL and 7mL (4mL 1% lidocaine, 3mL 0.25% marcaine)  of local anesthetic after sterile prep, to the correct procedural site. Sterile bandaid applied. This was tolerated well by the patient. No apparent complications. Did also discuss that if diabetic, recommend close monitoring of blood sugars over the next week as cortisone injections can temporarily elevate blood sugars.       The information in this document, created by a scribe for me, accurately reflects the services I personally performed and the decisions made by me. I have reviewed and approved this document for accuracy.     Rico Valladares M.D., M.S.  Dept. of Orthopaedic Surgery  [facility]    Large Joint Injection/Arthocentesis  Date/Time: 7/25/2018  2:16 PM  Performed by: SONIA MARTINEZ  Authorized by: YARIEL VALLADARES     Indications:  Pain  Indications comment:  Impingement  Needle Size:  22 G  Approach:  Posterolateral  Location:  Shoulder  Site:  L subacromial bursa  Medications:  80 mg triamcinolone acetonide 40 MG/ML; 3 mL bupivacaine 0.25 %; 4 mL lidocaine 1 %  Outcome:  Tolerated well, no immediate complications  Procedure discussed: discussed risks, benefits, and alternatives    Consent Given by:  Patient  Prep: patient was prepped and draped in usual sterile fashion            Again, thank you for allowing me to participate in the care of your patient.        Sincerely,        Yariel Valladares MD

## 2018-07-25 NOTE — MR AVS SNAPSHOT
"              After Visit Summary   7/25/2018    James Lama    MRN: [id number]           Patient Information     Date Of Birth          6/28/1927        Visit Information        Provider Department      7/25/2018 1:30 PM Rico Valladares MD [facility]        Today's Diagnoses     Chronic left shoulder pain    -  1    Impingement syndrome of shoulder region, left          Care Instructions    Patient to follow up with Primary Care provider regarding elevated blood pressure.            Follow-ups after your visit        Follow-up notes from your care team     Return if symptoms worsen or fail to improve.      Your next 10 appointments already scheduled     Mar 08, 2019 11:00 AM CST   Return Visit with Catia Loredo MD   [facility]    [address] [address] [place] [address] [phone]              Who to contact     If you have questions or need follow up information about today's clinic visit or your schedule please contact [facility] directly at [phone].  Normal or non-critical lab and imaging results will be communicated to you by MyChart, letter or phone within 4 business days after the clinic has received the results. If you do not hear from us within 7 days, please contact the clinic through [employer] or phone. If you have a critical or abnormal lab result, we will notify you by phone as soon as possible.  Submit refill requests through [employer] or call your pharmacy and they will forward the refill request to us. Please allow 3 business days for your refill to be completed.          Additional Information About Your Visit        [employer] Information     [employer] lets you send messages to your doctor, view your test results, renew your prescriptions, schedule appointments and more. To sign up, go to [email or web address] . Click on \"Log in\" on the left side of the screen, which will take you to the Welcome " "page. Then click on \"Sign up Now\" on the right side of the page.     You will be asked to enter the access code listed below, as well as some personal information. Please follow the directions to create your username and password.     Your access code is: [email or web address]  Expires: 10/14/2018  4:10 PM     Your access code will  in 90 days. If you need help or a new code, please call your [place] clinic or [phone].        Care EveryWhere ID     This is your Care EveryWhere ID. This could be used by other organizations to access your [place] medical records  [id number]        Your Vitals Were     Pulse Height BMI (Body Mass Index)             70 5' 8.75\" (1.746 m) 32.37 kg/m2          Blood Pressure from Last 3 Encounters:   18 163/70   18 128/60   18 139/63    Weight from Last 3 Encounters:   18 217 lb 9.6 oz (98.7 kg)   18 217 lb 3.2 oz (98.5 kg)   18 214 lb (97.1 kg)              We Performed the Following     Large Joint Injection/Arthocentesis        Primary Care Provider Office Phone # Fax #    Juan Carlos Jansen [phone]       [address] [place]  [facility] [address]        Equal Access to Services     [facility]: Hadii aad ku hadasho Soomaali, waaxda luqadaha, qaybta kaalmada adeegyada, danae west hayaditi mckee . So [facility] [phone].    ATENCIÓN: Si habla español, tiene a londono disposición servicios gratuitos de asistencia lingüística. Llame al [phone].    We comply with applicable federal civil rights laws and [place] laws. We do not discriminate on the basis of race, color, national origin, age, disability, sex, sexual orientation, or gender identity.            Thank you!     Thank you for choosing [facility]  for your care. Our goal is always to provide you with excellent care. Hearing back from our patients is one way we can continue to improve our services. Please take a few minutes to complete " the written survey that you may receive in the mail after your visit with us. Thank you!             Your Updated Medication List - Protect others around you: Learn how to safely use, store and throw away your medicines at www.disposemymeds.org.          This list is accurate as of 7/25/18  4:14 PM.  Always use your most recent med list.                   Brand Name Dispense Instructions for use Diagnosis    aspirin 81 MG tablet      1 tablet daily*        chlorthalidone 25 MG tablet    HYGROTON    45 tablet    Take 0.5 tablets (12.5 mg) by mouth daily for blood pressure.    Benign hypertension with chronic kidney disease, stage III       cyanocobalamin 1000 MCG Tabs      1 tablet daily for foot neuropathy (vitamin B12).        fluticasone 50 MCG/ACT spray    FLONASE    1 Bottle    USE ONE TO TWO SPRAY(S) IN EACH NOSTRIL ONCE DAILY FOR  ALLERGY  PREVENTION    Perennial allergic rhinitis       indomethacin 50 MG capsule    INDOCIN    40 capsule    Take 1 capsule (50 mg) by mouth 3 times daily (with meals) as needed for gouty attack.    Gout       levothyroxine 88 MCG tablet    SYNTHROID/LEVOTHROID    90 tablet    Take 1 tablet (88 mcg) by mouth daily for thyroid.    Hypothyroidism due to acquired atrophy of thyroid       lisinopril 40 MG tablet    PRINIVIL/ZESTRIL    90 tablet    Take 1 tablet (40 mg) by mouth daily for blood pressure.    Benign hypertension with chronic kidney disease, stage III       lovastatin 20 MG tablet    MEVACOR    90 tablet    Take 1 tablet (20 mg) by mouth every evening for cholesterol.    Hyperlipidemia LDL goal <130       metoprolol succinate 25 MG 24 hr tablet    TOPROL-XL    90 tablet    Take 1 tablet daily for blood pressure.    Benign hypertension with chronic kidney disease, stage III       omeprazole 20 MG CR capsule    priLOSEC    180 capsule    Take 2 capsules (40 mg) by mouth daily for reflux. Take 30-60 minutes before a meal.    Gastroesophageal reflux disease without  esophagitis       PRESERVISION AREDS PO      Take 1 capsule by mouth 2 times daily (with meals) (PreserVision AREDS 2)        TYLENOL 500 MG tablet   Generic drug:  acetaminophen      Take 2 tablets by mouth 2 times daily as needed for pain (knee).        VITAMIN D (CHOLECALCIFEROL) PO      Take 1,000 Units by mouth daily

## 2018-08-06 ENCOUNTER — OFFICE VISIT (OUTPATIENT)
Dept: FAMILY MEDICINE | Facility: CLINIC | Age: 83
End: 2018-08-06
Payer: COMMERCIAL

## 2018-08-06 VITALS
OXYGEN SATURATION: 98 % | HEIGHT: 69 IN | TEMPERATURE: 95.6 F | DIASTOLIC BLOOD PRESSURE: 73 MMHG | SYSTOLIC BLOOD PRESSURE: 133 MMHG | WEIGHT: 211.2 LBS | HEART RATE: 73 BPM | BODY MASS INDEX: 31.28 KG/M2

## 2018-08-06 DIAGNOSIS — R19.7 DIARRHEA, UNSPECIFIED TYPE: Primary | ICD-10-CM

## 2018-08-06 DIAGNOSIS — I10 HYPERTENSION GOAL BP (BLOOD PRESSURE) < 140/90: ICD-10-CM

## 2018-08-06 DIAGNOSIS — E66.811 OBESITY, CLASS I, BMI 30-34.9: ICD-10-CM

## 2018-08-06 DIAGNOSIS — A08.4 VIRAL GASTROENTERITIS: ICD-10-CM

## 2018-08-06 LAB
ANION GAP SERPL CALCULATED.3IONS-SCNC: 8 MMOL/L (ref 3–14)
BUN SERPL-MCNC: 33 MG/DL (ref 7–30)
CALCIUM SERPL-MCNC: 9.7 MG/DL (ref 8.5–10.1)
CHLORIDE SERPL-SCNC: 102 MMOL/L (ref 94–109)
CO2 SERPL-SCNC: 28 MMOL/L (ref 20–32)
CREAT SERPL-MCNC: 1.48 MG/DL (ref 0.66–1.25)
GFR SERPL CREATININE-BSD FRML MDRD: 45 ML/MIN/1.7M2
GLUCOSE SERPL-MCNC: 64 MG/DL (ref 70–99)
POTASSIUM SERPL-SCNC: 4.4 MMOL/L (ref 3.4–5.3)
SODIUM SERPL-SCNC: 138 MMOL/L (ref 133–144)

## 2018-08-06 PROCEDURE — 80048 BASIC METABOLIC PNL TOTAL CA: CPT | Performed by: NURSE PRACTITIONER

## 2018-08-06 PROCEDURE — 36415 COLL VENOUS BLD VENIPUNCTURE: CPT | Performed by: NURSE PRACTITIONER

## 2018-08-06 PROCEDURE — 99214 OFFICE O/P EST MOD 30 MIN: CPT | Performed by: NURSE PRACTITIONER

## 2018-08-06 RX ORDER — AMLODIPINE BESYLATE 5 MG/1
TABLET ORAL
COMMUNITY
Start: 2018-04-25 | End: 2018-08-20

## 2018-08-06 ASSESSMENT — ANXIETY QUESTIONNAIRES
7. FEELING AFRAID AS IF SOMETHING AWFUL MIGHT HAPPEN: NOT AT ALL
6. BECOMING EASILY ANNOYED OR IRRITABLE: NOT AT ALL
5. BEING SO RESTLESS THAT IT IS HARD TO SIT STILL: NOT AT ALL
1. FEELING NERVOUS, ANXIOUS, OR ON EDGE: NOT AT ALL
2. NOT BEING ABLE TO STOP OR CONTROL WORRYING: NOT AT ALL
GAD7 TOTAL SCORE: 0
3. WORRYING TOO MUCH ABOUT DIFFERENT THINGS: NOT AT ALL

## 2018-08-06 ASSESSMENT — PATIENT HEALTH QUESTIONNAIRE - PHQ9: 5. POOR APPETITE OR OVEREATING: NOT AT ALL

## 2018-08-06 NOTE — LETTER
August 8, 2018      James Daina  [address]  [place] [address]        Hi James,     Your kidney function is decreased significantly from 5 months ago.  I think this is due to dehydration so I want you to be drinking plenty of fluids as we discussed at your visit.  Your blood sugar was also low, likely from not eating.  I see that you were in to see Dr. Jansen yesterday because your symptoms worsened.  Please let us know if you continue to have symptoms.     Thanks,     Collette ROB, CNP    Resulted Orders   Basic metabolic panel  (Ca, Cl, CO2, Creat, Gluc, K, Na, BUN)   Result Value Ref Range    Sodium 138 133 - 144 mmol/L    Potassium 4.4 3.4 - 5.3 mmol/L    Chloride 102 94 - 109 mmol/L    Carbon Dioxide 28 20 - 32 mmol/L    Anion Gap 8 3 - 14 mmol/L    Glucose 64 (L) 70 - 99 mg/dL    Urea Nitrogen 33 (H) 7 - 30 mg/dL    Creatinine 1.48 (H) 0.66 - 1.25 mg/dL    GFR Estimate 45 (L) >60 mL/min/1.7m2      Comment:      Non  GFR Calc    GFR Estimate If Black 54 (L) >60 mL/min/1.7m2      Comment:       GFR Calc    Calcium 9.7 8.5 - 10.1 mg/dL

## 2018-08-06 NOTE — PATIENT INSTRUCTIONS
At [facility], we strive to deliver an exceptional experience to you, every time we see you.  If you receive a survey in the mail, please send us back your thoughts. We really do value your feedback.    Based on your medical history, these are the current health maintenance/preventive care services that you are due for (some may have been done at this visit.)  Health Maintenance Due   Topic Date Due     EYE EXAM Q1 YEAR  08/05/2016     PHQ-9 Q1YR  02/16/2018     MARJ QUESTIONNAIRE 1 YEAR  03/02/2018       Suggested websites for health information:  Www.Toutiao.Track the Bet : Up to date and easily searchable information on multiple topics.  Www.Nextly.gov : medication info, interactive tutorials, watch real surgeries online  Www.familydoctor.org : good info from the Academy of Family Physicians  Www.cdc.gov : public health info, travel advisories, epidemics (H1N1)  Www.aap.org : children's health info, normal development, vaccinations  Www.health.[facility].[place].us : [place] dept of health, public health issues in [place], N1N1    Your care team:                            Family Medicine Internal Medicine   MD Trino Lombardo MD Shantel Branch-Fleming, MD Katya Georgiev PA-C Megan Hill, APRN CNP    Sergo Rolon MD Pediatrics   Yimi Post, PAESME Schwarz, CNP MD Yolie Carlos APRN CNP   MD Chelo Ansari MD Deborah Mielke, MD Kim Thein, APRN [facility]      Clinic hours: Monday - Thursday 7 am-7 pm; Fridays 7 am-5 pm.   Urgent care: Monday - Friday 11 am-9 pm; Saturday and Sunday 9 am-5 pm.  Pharmacy : Monday -Thursday 8 am-8 pm; Friday 8 am-6 pm; Saturday and Sunday 9 am-5 pm.     Clinic: [phone]   Pharmacy: [phone]      Viral Gastroenteritis (Adult)    Gastroenteritis is commonly called the stomach flu. It is most often caused by a virus that affects the stomach and intestinal tract and usually lasts from 2 to 7 days. Common viruses causing  gastroenteritis include norovirus, rotavirus, and hepatitis A. Non-viral causes of gastroenteritis include bacteria, parasites, and toxins.  The danger from repeated vomiting or diarrhea is dehydration. This is the loss of too much fluid from the body. When this occurs, body fluids must be replaced. Antibiotics do not help with this illness because it is usually viral.Simple home treatment will be helpful.  Symptoms of viral gastroenteritis may include:    Watery, loose stools    Stomach pain or abdominal cramps    Fever and chills    Nausea and vomiting    Loss of bowel control    Headache  Home care  Gastroenteritis is transmitted by contact with the stool or vomit of an infected person. This can occur from person to person or from contact with a contaminated surface.  Follow these guidelines when caring for yourself at home:    If symptoms are severe, rest at home for the next 24 hours or until you are feeling better.    Wash your hands with soap and water or use alcohol-based  to prevent the spread of infection. Wash your hands after touching anyone who is sick.    Wash your hands or use alcohol-based  after using the toilet and before meals. Clean the toilet after each use.  Remember these tips when preparing food:    People with diarrhea should not prepare or serve food to others. When preparing foods, wash your hands before and after.    Wash your hands after using cutting boards, countertops, knives, or utensils that have been in contact with raw food.    Keep uncooked meats away from cooked and ready-to-eat foods.  Medicine  You may use acetaminophen or NSAID medicines like ibuprofen or naproxen to control fever unless another medicine was given. If you have chronic liver or kidney disease, talk with your healthcare provider before using these medicines. Also talk with your provider if you've had a stomach ulcer or gastrointestinal bleeding. Don't give aspirin to anyone under 18 years of  age who is ill with a fever. It may cause severe liver damage. Don't use NSAIDS is you are already taking one for another condition (like arthritis) or are on aspirin (such as for heart disease or after a stroke).  If medicine for vomiting or diarrhea are prescribed, take these only as directed. Do not take over-the-counter medicines for vomiting or diarrhea unless instructed by your healthcare provider.  Diet  Follow these guidelines for food:    Water and liquids are important so you don't get dehydrated. Drink a small amount at a time or suck on ice chips if you are vomiting.    If you eat, avoid fatty, greasy, spicy, or fried foods.    Don't eat dairy if you have diarrhea. This can make diarrhea worse.    Avoid tobacco, alcohol, and caffeine which may worsen symptoms.  During the first 24 hours (the first full day), follow the diet below:    Beverages. Sports drinks, soft drinks without caffeine, ginger ale, mineral water (plain or flavored), decaffeinated tea and coffee. If you are very dehydrated, sports drinks aren't a good choice. They have too much sugar and not enough electrolytes. In this case, commercially available products called oral rehydration solutions, are best.    Soups. Eat clear broth, consommé, and bouillon.    Desserts. Eat gelatin, popsicles, and fruit juice bars.  During the next 24 hours (the second day), you may add the following to the above:    Hot cereal, plain toast, bread, rolls, and crackers    Plain noodles, rice, mashed potatoes, chicken noodle or rice soup    Unsweetened canned fruit (avoid pineapple), bananas    Limit fat intake to less than 15 grams per day. Do this by avoiding margarine, butter, oils, mayonnaise, sauces, gravies, fried foods, peanut butter, meat, poultry, and fish.    Limit fiber and avoid raw or cooked vegetables, fresh fruits (except bananas), and bran cereals.    Limit caffeine and chocolate. Don't use spices or seasonings other than salt.    Limit dairy  products.    Avoid alcohol.  During the next 24 hours:    Gradually resume a normal diet as you feel better and your symptoms improve.    If at any time it starts getting worse again, go back to clear liquids until you feel better.  Follow-up care  Follow up with your healthcare provider, or as advised. Call your provider if you don't get better within 24 hours or if diarrhea lasts more than a week. Also follow up if you are unable to keep down liquids and get dehydrated. If a stool (diarrhea) sample was taken, call as directed for the results.  Call 911  Call 911 if any of these occur:    Trouble breathing    Chest pain    Confused    Severe drowsiness or trouble awakening    Fainting or loss of consciousness    Rapid heart rate    Seizure    Stiff neck  When to seek medical advice  Call your healthcare provider right away if any of these occur:    Abdominal pain that gets worse    Continued vomiting (unable to keep liquids down)    Frequent diarrhea (more than 5 times a day)    Blood in vomit or stool (black or red color)    Dark urine, reduced urine output, or extreme thirst    Weakness or dizziness    Drowsiness    Fever of 100.4 F (38 C) or higher, or as directed by your healthcare provider    New rash  Date Last Reviewed: 1/3/2016    8199-4796 [employer]. [address] [place] [address], [place] [address]. All rights reserved. This information is not intended as a substitute for professional medical care. Always follow your healthcare professional's instructions.

## 2018-08-06 NOTE — MR AVS SNAPSHOT
After Visit Summary   8/6/2018    James Lama    MRN: [id number]           Patient Information     Date Of Birth          6/28/1927        Visit Information        Provider Department      8/6/2018 10:00 AM Collette Walker, APRN CNP [facility]        Today's Diagnoses     Diarrhea, unspecified type    -  1    Viral gastroenteritis        Hypertension goal BP (blood pressure) < 140/90        Chronic kidney disease, stage 3 (moderate)        Obesity, Class I, BMI 30-34.9          Care Instructions    At [facility], we strive to deliver an exceptional experience to you, every time we see you.  If you receive a survey in the mail, please send us back your thoughts. We really do value your feedback.    Based on your medical history, these are the current health maintenance/preventive care services that you are due for (some may have been done at this visit.)  Health Maintenance Due   Topic Date Due     EYE EXAM Q1 YEAR  08/05/2016     PHQ-9 Q1YR  02/16/2018     MARJ QUESTIONNAIRE 1 YEAR  03/02/2018       Suggested websites for health information:  Www.[employer] : Up to date and easily searchable information on multiple topics.  Www.medlineplus.gov : medication info, interactive tutorials, watch real surgeries online  Www.familydoctor.org : good info from the Academy of Family Physicians  Www.cdc.gov : public health info, travel advisories, epidemics (H1N1)  Www.aap.org : children's health info, normal development, vaccinations  Www.health.[facility].[place].us : [place] dept of health, public health issues in [place], N1N1    Your care team:                            Family Medicine Internal Medicine   MD Trino Lombardo MD Shantel Branch-Fleming, MD Katya Georgiev PA-C Megan Hill, LIANE Rolon MD Pediatrics   WERO Martinez, MD Yolie Pagan APRN CNP   MD Chelo Ansari MD Deborah Mielke, MD     LIANE Steward [facility]      Clinic hours: Monday - Thursday 7 am-7 pm; Fridays 7 am-5 pm.   Urgent care: Monday - Friday 11 am-9 pm; Saturday and Sunday 9 am-5 pm.  Pharmacy : Monday -Thursday 8 am-8 pm; Friday 8 am-6 pm; Saturday and Sunday 9 am-5 pm.     Clinic: [phone]   Pharmacy: [phone]      Viral Gastroenteritis (Adult)    Gastroenteritis is commonly called the stomach flu. It is most often caused by a virus that affects the stomach and intestinal tract and usually lasts from 2 to 7 days. Common viruses causing gastroenteritis include norovirus, rotavirus, and hepatitis A. Non-viral causes of gastroenteritis include bacteria, parasites, and toxins.  The danger from repeated vomiting or diarrhea is dehydration. This is the loss of too much fluid from the body. When this occurs, body fluids must be replaced. Antibiotics do not help with this illness because it is usually viral.Simple home treatment will be helpful.  Symptoms of viral gastroenteritis may include:    Watery, loose stools    Stomach pain or abdominal cramps    Fever and chills    Nausea and vomiting    Loss of bowel control    Headache  Home care  Gastroenteritis is transmitted by contact with the stool or vomit of an infected person. This can occur from person to person or from contact with a contaminated surface.  Follow these guidelines when caring for yourself at home:    If symptoms are severe, rest at home for the next 24 hours or until you are feeling better.    Wash your hands with soap and water or use alcohol-based  to prevent the spread of infection. Wash your hands after touching anyone who is sick.    Wash your hands or use alcohol-based  after using the toilet and before meals. Clean the toilet after each use.  Remember these tips when preparing food:    People with diarrhea should not prepare or serve food to others. When preparing foods, wash your hands before and after.    Wash your hands after using  cutting boards, countertops, knives, or utensils that have been in contact with raw food.    Keep uncooked meats away from cooked and ready-to-eat foods.  Medicine  You may use acetaminophen or NSAID medicines like ibuprofen or naproxen to control fever unless another medicine was given. If you have chronic liver or kidney disease, talk with your healthcare provider before using these medicines. Also talk with your provider if you've had a stomach ulcer or gastrointestinal bleeding. Don't give aspirin to anyone under 18 years of age who is ill with a fever. It may cause severe liver damage. Don't use NSAIDS is you are already taking one for another condition (like arthritis) or are on aspirin (such as for heart disease or after a stroke).  If medicine for vomiting or diarrhea are prescribed, take these only as directed. Do not take over-the-counter medicines for vomiting or diarrhea unless instructed by your healthcare provider.  Diet  Follow these guidelines for food:    Water and liquids are important so you don't get dehydrated. Drink a small amount at a time or suck on ice chips if you are vomiting.    If you eat, avoid fatty, greasy, spicy, or fried foods.    Don't eat dairy if you have diarrhea. This can make diarrhea worse.    Avoid tobacco, alcohol, and caffeine which may worsen symptoms.  During the first 24 hours (the first full day), follow the diet below:    Beverages. Sports drinks, soft drinks without caffeine, ginger ale, mineral water (plain or flavored), decaffeinated tea and coffee. If you are very dehydrated, sports drinks aren't a good choice. They have too much sugar and not enough electrolytes. In this case, commercially available products called oral rehydration solutions, are best.    Soups. Eat clear broth, consommé, and bouillon.    Desserts. Eat gelatin, popsicles, and fruit juice bars.  During the next 24 hours (the second day), you may add the following to the above:    Hot cereal, plain  toast, bread, rolls, and crackers    Plain noodles, rice, mashed potatoes, chicken noodle or rice soup    Unsweetened canned fruit (avoid pineapple), bananas    Limit fat intake to less than 15 grams per day. Do this by avoiding margarine, butter, oils, mayonnaise, sauces, gravies, fried foods, peanut butter, meat, poultry, and fish.    Limit fiber and avoid raw or cooked vegetables, fresh fruits (except bananas), and bran cereals.    Limit caffeine and chocolate. Don't use spices or seasonings other than salt.    Limit dairy products.    Avoid alcohol.  During the next 24 hours:    Gradually resume a normal diet as you feel better and your symptoms improve.    If at any time it starts getting worse again, go back to clear liquids until you feel better.  Follow-up care  Follow up with your healthcare provider, or as advised. Call your provider if you don't get better within 24 hours or if diarrhea lasts more than a week. Also follow up if you are unable to keep down liquids and get dehydrated. If a stool (diarrhea) sample was taken, call as directed for the results.  Call 911  Call 911 if any of these occur:    Trouble breathing    Chest pain    Confused    Severe drowsiness or trouble awakening    Fainting or loss of consciousness    Rapid heart rate    Seizure    Stiff neck  When to seek medical advice  Call your healthcare provider right away if any of these occur:    Abdominal pain that gets worse    Continued vomiting (unable to keep liquids down)    Frequent diarrhea (more than 5 times a day)    Blood in vomit or stool (black or red color)    Dark urine, reduced urine output, or extreme thirst    Weakness or dizziness    Drowsiness    Fever of 100.4 F (38 C) or higher, or as directed by your healthcare provider    New rash  Date Last Reviewed: 1/3/2016    3685-6737 [employer]. [address] [place] [address], [place] [address]. All rights reserved. This information is not intended as a substitute for  "professional medical care. Always follow your healthcare professional's instructions.                Follow-ups after your visit        Your next 10 appointments already scheduled     Mar 08, 2019 11:00 AM CST   [facility]    [address] [place] [id number] [phone]              Who to contact     If you have questions or need follow up information about today's clinic visit or your schedule please contact [facility] directly at [phone].  Normal or non-critical lab and imaging results will be communicated to you by DreamLineshart, letter or phone within 4 business days after the clinic has received the results. If you do not hear from us within 7 days, please contact the clinic through DreamLines[employer] or phone. If you have a critical or abnormal lab result, we will notify you by phone as soon as possible.  Submit refill requests through [employer] or call your pharmacy and they will forward the refill request to us. Please allow 3 business days for your refill to be completed.          Additional Information About Your Visit        MyChart Information     [employer] lets you send messages to your doctor, view your test results, renew your prescriptions, schedule appointments and more. To sign up, go to [email or web address] . Click on \"Log in\" on the left side of the screen, which will take you to the Welcome page. Then click on \"Sign up Now\" on the right side of the page.     You will be asked to enter the access code listed below, as well as some personal information. Please follow the directions to create your username and password.     Your access code is: [email or web address]  Expires: 10/14/2018  4:10 PM     Your access code will  in 90 days. If you need help or a new code, please call your [facility]3] or [phone].        Care EveryWhere ID     This is your Care EveryWhere ID. This could be " "used by other organizations to access your [facility] medical records  [id number]        Your Vitals Were     Pulse Temperature Height Pulse Oximetry BMI (Body Mass Index)       73 95.6  F (35.3  C) (Tympanic) 5' 8.75\" (1.746 m) 98% 31.42 kg/m2        Blood Pressure from Last 3 Encounters:   08/06/18 133/73   07/25/18 163/70   07/16/18 128/60    Weight from Last 3 Encounters:   08/06/18 211 lb 3.2 oz (95.8 kg)   07/25/18 217 lb 9.6 oz (98.7 kg)   07/16/18 217 lb 3.2 oz (98.5 kg)              We Performed the Following     Basic metabolic panel  (Ca, Cl, CO2, Creat, Gluc, K, Na, BUN)        Primary Care Provider Office Phone # Fax #    Juan Carlos Jansen [phone]       [address]  [facility] [address]        Equal Access to Services     [facility]: Hadii aad ku hadasho Soomaali, waaxda luqadaha, qaybta kaalmada adeegyada, waxay idiin hayaditi mckee . So [facility] [phone].    ATENCIÓN: Si habla español, tiene a londono disposición servicios gratuitos de asistencia lingüística. Ll[facility] [phone].    We comply with applicable federal civil rights laws and [place] laws. We do not discriminate on the basis of race, color, national origin, age, disability, sex, sexual orientation, or gender identity.            Thank you!     Thank you for choosing [facility]  for your care. Our goal is always to provide you with excellent care. Hearing back from our patients is one way we can continue to improve our services. Please take a few minutes to complete the written survey that you may receive in the mail after your visit with us. Thank you!             Your Updated Medication List - Protect others around you: Learn how to safely use, store and throw away your medicines at www.disposemymeds.org.          This list is accurate as of 8/6/18 10:36 AM.  Always use your most recent med list.                   Brand Name Dispense Instructions for use Diagnosis    " amLODIPine 5 MG tablet    NORVASC          aspirin 81 MG tablet      1 tablet daily*        chlorthalidone 25 MG tablet    HYGROTON    45 tablet    Take 0.5 tablets (12.5 mg) by mouth daily for blood pressure.    Benign hypertension with chronic kidney disease, stage III       cyanocobalamin 1000 MCG Tabs      1 tablet daily for foot neuropathy (vitamin B12).        fluticasone 50 MCG/ACT spray    FLONASE    1 Bottle    USE ONE TO TWO SPRAY(S) IN EACH NOSTRIL ONCE DAILY FOR  ALLERGY  PREVENTION    Perennial allergic rhinitis       indomethacin 50 MG capsule    INDOCIN    40 capsule    Take 1 capsule (50 mg) by mouth 3 times daily (with meals) as needed for gouty attack.    Gout       levothyroxine 88 MCG tablet    SYNTHROID/LEVOTHROID    90 tablet    Take 1 tablet (88 mcg) by mouth daily for thyroid.    Hypothyroidism due to acquired atrophy of thyroid       lisinopril 40 MG tablet    PRINIVIL/ZESTRIL    90 tablet    Take 1 tablet (40 mg) by mouth daily for blood pressure.    Benign hypertension with chronic kidney disease, stage III       lovastatin 20 MG tablet    MEVACOR    90 tablet    Take 1 tablet (20 mg) by mouth every evening for cholesterol.    Hyperlipidemia LDL goal <130       metoprolol succinate 25 MG 24 hr tablet    TOPROL-XL    90 tablet    Take 1 tablet daily for blood pressure.    Benign hypertension with chronic kidney disease, stage III       omeprazole 20 MG CR capsule    priLOSEC    180 capsule    Take 2 capsules (40 mg) by mouth daily for reflux. Take 30-60 minutes before a meal.    Gastroesophageal reflux disease without esophagitis       PRESERVISION AREDS PO      Take 1 capsule by mouth 2 times daily (with meals) (PreserVision AREDS 2)        TYLENOL 500 MG tablet   Generic drug:  acetaminophen      Take 2 tablets by mouth 2 times daily as needed for pain (knee).        VITAMIN D (CHOLECALCIFEROL) PO      Take 1,000 Units by mouth daily

## 2018-08-06 NOTE — PROGRESS NOTES
SUBJECTIVE:   James Lama is a 89 year old male who presents to clinic today for the following health issues:    Diarrhea      Duration: Since last Thursday    Description:       Consistency of stool: loose       Blood in stool: no        Number of loose stools past 24 hours: more than 5    Intensity:  moderate    Accompanying signs and symptoms:       Fever: no        Nausea/vomitting: no        Abdominal pain: no        Weight loss: no     History (recent antibiotics or travel/ill contacts/med changes/testing done): [place]    Precipitating or alleviating factors: None    Therapies tried and outcome: PeptoBismol and tums  Wife has similar symptoms. No recent travel.  Patient states he was actually feeling better this morning but then ate waffles and elaine and had diarrhea afterward. He is feeling better now.  Stools are loose, brown to black (has been taking Pepto Bismol).  He wonders if his symptoms are due to the [place] he started taking 6 days ago for  LE edema which is now resolved. He eats a diet high if fat and sweets despite his wife trying to limit his intake.  No abdominal pain or cramping now (has some mild cramping prior to having a diarrhea stool).  He is tolerating fluids well    Hypertension Follow-up      Outpatient blood pressures are not being checked.    Low Salt Diet: no added salt      Problem list and histories reviewed & adjusted, as indicated.  Additional history: as documented    Patient Active Problem List   Diagnosis     Prostate cancer (H)     Hyperlipidemia LDL goal <130     Benign hypertension with chronic kidney disease, stage III     KESHA (obstructive sleep apnea)     Gout     Diverticulosis     Perennial allergic rhinitis     GERD (gastroesophageal reflux disease)     Hypothyroidism due to acquired atrophy of thyroid     Erectile dysfunction     Hypertension goal BP (blood pressure) < 140/90     Right inguinal hernia     History of basal cell carcinoma     Advance care  planning     Paresthesias/numbness, both feet     Vitamin B12 deficiency (non anemic)     Health Care Home     Actinic keratosis     Obesity, Class I, BMI 30-34.9     Recurrent falls     Cortical cataract of both eyes     Nuclear sclerosis of both eyes     Chronic kidney disease, stage 3 (moderate)     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     Perennial allergic rhinitis, unspecified allergic rhinitis trigger     Cognitive impairment, mild, so stated     Past Surgical History:   Procedure Laterality Date     EXC SKIN MALIG 0.5CM OR LESS,FACIAL      BCC nasal tip     EXC SKIN MALIG 0.6-1CM FACE,FACIAL      BCC left ala     EXC SKIN MALIG 1.1-2CM FACE,FACIAL  10/09    BCC forehead     EXC SKIN MALIG 1.1-2CM TRUNK,ARM,LEG      BCC upper back     HERNIA REPAIR, INGUINAL RT/LT  ~    LT       Social History   Substance Use Topics     Smoking status: Never Smoker     Smokeless tobacco: Never Used     Alcohol use No     Family History   Problem Relation Age of Onset     Cancer Father      , unk type     Myocardial Infarction Sister      Thyroid Disease Sister      Diabetes No family hx of      Hypertension No family hx of      Cerebrovascular Disease No family hx of      Glaucoma No family hx of      Macular Degeneration No family hx of          Current Outpatient Prescriptions   Medication Sig Dispense Refill     acetaminophen (TYLENOL) 500 MG tablet Take 2 tablets by mouth 2 times daily as needed for pain (knee).  0     amLODIPine (NORVASC) 5 MG tablet        ASPIRIN 81 MG OR TABS 1 tablet daily*       chlorthalidone (HYGROTON) 25 MG tablet Take 0.5 tablets (12.5 mg) by mouth daily for blood pressure. 45 tablet 0     Cyanocobalamin 1000 MCG TABS 1 tablet daily for foot neuropathy (vitamin B12).       fluticasone (FLONASE) 50 MCG/ACT spray USE ONE TO TWO SPRAY(S) IN EACH NOSTRIL ONCE DAILY FOR  ALLERGY  PREVENTION 1 Bottle 1     indomethacin (INDOCIN) 50 MG capsule Take 1 capsule (50 mg) by mouth 3  "times daily (with meals) as needed for gouty attack. 40 capsule 1     levothyroxine (SYNTHROID/LEVOTHROID) 88 MCG tablet Take 1 tablet (88 mcg) by mouth daily for thyroid. 90 tablet 1     lisinopril (PRINIVIL/ZESTRIL) 40 MG tablet Take 1 tablet (40 mg) by mouth daily for blood pressure. 90 tablet 1     lovastatin (MEVACOR) 20 MG tablet Take 1 tablet (20 mg) by mouth every evening for cholesterol. 90 tablet 1     metoprolol succinate (TOPROL-XL) 25 MG 24 hr tablet Take 1 tablet daily for blood pressure. 90 tablet 1     Multiple Vitamins-Minerals (PRESERVISION AREDS PO) Take 1 capsule by mouth 2 times daily (with meals) (PreserVision AREDS 2)       omeprazole (PRILOSEC) 20 MG CR capsule Take 2 capsules (40 mg) by mouth daily for reflux. Take 30-60 minutes before a meal. 180 capsule 3     VITAMIN D, CHOLECALCIFEROL, PO Take 1,000 Units by mouth daily        BP Readings from Last 3 Encounters:   08/06/18 133/73   07/25/18 163/70   07/16/18 128/60    Wt Readings from Last 3 Encounters:   08/06/18 211 lb 3.2 oz (95.8 kg)   07/25/18 217 lb 9.6 oz (98.7 kg)   07/16/18 217 lb 3.2 oz (98.5 kg)                    Reviewed and updated as needed this visit by clinical staff  Tobacco  Allergies  Meds  Med Hx  Surg Hx  Fam Hx  Soc Hx      Reviewed and updated as needed this visit by Provider         ROS:  Constitutional, HEENT, cardiovascular, pulmonary, gi and gu systems are negative, except as otherwise noted.    OBJECTIVE:     /73 (BP Location: Left arm, Patient Position: Chair, Cuff Size: Adult Large)  Pulse 73  Temp 95.6  F (35.3  C) (Tympanic)  Ht 5' 8.75\" (1.746 m)  Wt 211 lb 3.2 oz (95.8 kg)  SpO2 98%  BMI 31.42 kg/m2  Body mass index is 31.42 kg/(m^2).  GENERAL: healthy, alert and no distress  EYES: Eyes grossly normal to inspection, PERRL and conjunctivae and sclerae normal  HENT: ear canals and TM's normal, nose and mouth without ulcers or lesions  NECK: no adenopathy, no asymmetry, masses, or scars " "and thyroid normal to palpation  RESP: lungs clear to auscultation - no rales, rhonchi or wheezes  CV: regular rate and rhythm, normal S1 S2, no S3 or S4, no murmur, click or rub, no peripheral edema and peripheral pulses strong  ABDOMEN: Obese, soft, nontender, no hepatosplenomegaly, no masses and bowel sounds normal  MS: no gross musculoskeletal defects noted, no edema  SKIN: no suspicious lesions or rashes  NEURO: Normal strength and tone, mentation intact and speech normal  BACK: no CVA tenderness, no paralumbar tenderness  PSYCH: mentation appears normal, affect normal/bright  LYMPH: no cervical adenopathy    Diagnostic Test Results:  No results found for this or any previous visit (from the past 24 hour(s)).    ASSESSMENT/PLAN:         BMI:   Estimated body mass index is 31.42 kg/(m^2) as calculated from the following:    Height as of this encounter: 5' 8.75\" (1.746 m).    Weight as of this encounter: 211 lb 3.2 oz (95.8 kg).   Weight management plan: Discussed healthy diet and exercise guidelines and patient will follow up in 12 months in clinic to re-evaluate.      1. Diarrhea, unspecified type  Likely viral, reviewed home care in detail, supportive treatment for now.    2. Viral gastroenteritis  SUPPORTIVE CARE FOR VOMITING/DIARRHEA REVIEWED. RECHECK IF BLOODY DIARRHEA, PROLONGED SYMPTOMS OR SX OF DEHYDRATION WHICH WERE DISCUSSED. SMALL FREQUENT AMOUNTS OF REHYDRATION FLUIDS SUCH AS PEDIALYTE/GASTORADE TO BE GRADUALLY ADVANCED AS TOLERATED FOR VOMITING. FOR DIARRHEA ENCOURAGE REHYDRATION FLUIDS AS WELL AS DIET OF SOLIDS AS TOLERATED. AVOID GREASY, FATTY FOODS FOR NOW.  - Basic metabolic panel  (Ca, Cl, CO2, Creat, Gluc, K, Na, BUN)  3. Hypertension goal BP (blood pressure) < 140/90  Well controlled.  - Basic metabolic panel  (Ca, Cl, CO2, Creat, Gluc, K, Na, BUN)    4. Obesity, Class I, BMI 30-34.9  Benefits of weight loss reviewed in detail, encouraged him to cut back on the carbohydrates in the diet, " consume more fruits and vegetables, drink plenty of water, avoid fruit juices, sodas, get 150 min moderate exercise/week.  Recheck weight in 6 months.          See Patient Instructions    LIANE Daniel [facility]

## 2018-08-07 ENCOUNTER — TELEPHONE (OUTPATIENT)
Dept: FAMILY MEDICINE | Facility: CLINIC | Age: 83
End: 2018-08-07

## 2018-08-07 ENCOUNTER — OFFICE VISIT (OUTPATIENT)
Dept: FAMILY MEDICINE | Facility: CLINIC | Age: 83
End: 2018-08-07
Payer: COMMERCIAL

## 2018-08-07 VITALS
HEIGHT: 69 IN | SYSTOLIC BLOOD PRESSURE: 127 MMHG | DIASTOLIC BLOOD PRESSURE: 67 MMHG | WEIGHT: 210 LBS | OXYGEN SATURATION: 99 % | BODY MASS INDEX: 31.1 KG/M2 | TEMPERATURE: 97.4 F | HEART RATE: 58 BPM

## 2018-08-07 DIAGNOSIS — R19.7 DIARRHEA, UNSPECIFIED TYPE: Primary | ICD-10-CM

## 2018-08-07 PROCEDURE — 99214 OFFICE O/P EST MOD 30 MIN: CPT | Performed by: FAMILY MEDICINE

## 2018-08-07 ASSESSMENT — PAIN SCALES - GENERAL: PAINLEVEL: MODERATE PAIN (4)

## 2018-08-07 ASSESSMENT — PATIENT HEALTH QUESTIONNAIRE - PHQ9: SUM OF ALL RESPONSES TO PHQ QUESTIONS 1-9: 3

## 2018-08-07 ASSESSMENT — ANXIETY QUESTIONNAIRES: GAD7 TOTAL SCORE: 0

## 2018-08-07 NOTE — PROGRESS NOTES
SUBJECTIVE:   James Lama is a 89 year old male who presents to clinic today for the following health issues:  He is accompanied by his wife.    Diarrhea  Onset: about 5 days ago (since last Thursday, 8/2)    Description:   Consistency of stool: watery and explosive  Blood in stool: no   Number of loose stools in past 24 hours: 5    Progression of Symptoms:  worsening    Accompanying Signs & Symptoms:  Fever: no   Nausea or vomiting; no   Abdominal pain: YES  Episodes of constipation: no   Weight loss: no     History:   Ill contacts: no   Recent use of antibiotics: no    Recent travels: no          Recent medication-new or changes(Rx or OTC): YES- blood pressure medication started 1 week ago     Precipitating factors: None    Alleviating factors:   Seen 8/6/18    Therapies Tried and outcome:  PeptoBismol; Outcome: did not help    He was seen at our clinic by Tricia Walker CNP for this same problem yesterday, but the problem has since worsened. His kidney functions were checked yesterday and were decreased. He started the chlorthalidone last Tuesday (7/31).          Past medical, family, and social histories, medications, and allergies are reviewed and updated in Epic.     ROS:  CONSTITUTIONAL: NEGATIVE for fever, chills, change in weight  ENT/MOUTH: NEGATIVE for ear, mouth and throat problems  RESP: NEGATIVE for significant cough or SOB  CV: There might have been some confusion about when he switched from amlodipine to the chlorthalidone, and when he took his compression socks off. James thinks that he follow the instructions correctly, but his wife thinks that he didn't start the new medicine for a week. NEGATIVE for chest pain, palpitations or peripheral edema  GI: POSITIVE for abdominal pain generalized and diarrhea    This document serves as a record of the services and decisions personally performed and made by Dr. Jansen. It was created on his behalf by Dalia Sanchez, a trained medical scribe. The creation of  "this document is based the provider's statements to the medical scribe.  Dalia Sanchez August 7, 2018 11:31 AM     OBJECTIVE:                                                    /67 (BP Location: Left arm, Patient Position: Chair, Cuff Size: Adult Large)  Pulse 58  Temp 97.4  F (36.3  C) (Oral)  Ht 1.746 m (5' 8.75\")  Wt 95.3 kg (210 lb)  SpO2 99%  BMI 31.24 kg/m2   Body mass index is 31.24 kg/(m^2).   GENERAL: healthy, alert and no distress  EYES: Eyes grossly normal to inspection, PERRL and conjunctivae and sclerae normal  HENT: ear canals and TM's normal, nose and mouth without ulcers or lesions  NECK: no adenopathy, no asymmetry, masses, or scars and thyroid normal to palpation  RESP: lungs clear to auscultation - no rales, rhonchi or wheezes  CV: regular rate and rhythm, normal S1 S2, no S3 or S4, no murmur, click or rub, no peripheral edema and peripheral pulses strong  ABDOMEN: soft, nontender, no hepatosplenomegaly, no masses and bowel sounds normal  MS: no gross musculoskeletal defects noted, no edema  SKIN: no suspicious lesions or rashes  NEURO: Normal strength and tone, mentation intact and speech normal  PSYCH: mentation appears normal, affect normal/bright    Diagnostic Test Results:  No results found for this or any previous visit (from the past 24 hour(s)).     ASSESSMENT/PLAN:                                                      (R19.7) Diarrhea, unspecified type  (primary encounter diagnosis)  Comment: For now, this still seems likely to be viral GE. His last creatinine level suggests mild dehydration from the diarrhea.   Plan: Adenovirus antigen, stool, Enteric Bacteria and        Virus Panel by [facility] Stool, Fecal Lactoferrin,         Clostridium difficile Toxin B PCR        Maintain good hydration. Handouts provided. He can use Imodium, but probably stop taking Pepto bismol. Follow up if symptoms persist into next week, or based on a specific organism identified with labs.       The " information in this document, created by the medical scribe for me, accurately reflects the services I personally performed and the decisions made by me. I have reviewed and approved this document for accuracy prior to leaving the patient care area. August 7, 2018 11:31 AM     Juan Carlos Jansen MD

## 2018-08-07 NOTE — MR AVS SNAPSHOT
After Visit Summary   8/7/2018    James Lama    MRN: [id number]           Patient Information     Date Of Birth          6/28/1927        Visit Information        Provider Department      8/7/2018 11:00 AM Juan Carlos Jansen MD [facility]        Today's Diagnoses     Diarrhea, unspecified type    -  1      Care Instructions    At [facility], we strive to deliver an exceptional experience to you, every time we see you.  If you receive a survey in the mail, please send us back your thoughts. We really do value your feedback.    Based on your medical history, these are the current health maintenance/preventive care services that you are due for (some may have been done at this visit.)  Health Maintenance Due   Topic Date Due     EYE EXAM Q1 YEAR  08/05/2016     PHQ-9 Q1YR  02/16/2018     MARJ QUESTIONNAIRE 1 YEAR  03/02/2018       Suggested websites for health information:  Www.Browsarity : Up to date and easily searchable information on multiple topics.  Www.medlineplus.gov : medication info, interactive tutorials, watch real surgeries online  Www.familydoctor.org : good info from the Academy of Family Physicians  Www.cdc.gov : public health info, travel advisories, epidemics (H1N1)  Www.aap.org : children's health info, normal development, vaccinations  Www.health.state.[place].us : [place] dept of health, public health issues in [place], N1N1    Your care team:                            Family Medicine Internal Medicine   MD Trino Lombardo MD Shantel Branch-Fleming, MD Katya Georgiev PA-C Megan Hill, LIANE Rolon MD Pediatrics   WERO Martinez, MD Yolie Pagan APRN CNP   MD Chelo Ansari MD Deborah Mielke, MD Kim Thein, APRN [facility]      Clinic hours: Monday - Thursday 7 am-7 pm; Fridays 7 am-5 pm.   Urgent care: Monday - Friday 11 am-9 pm; Saturday and Sunday 9 am-5 pm.  Pharmacy :  Monday -Thursday 8 am-8 pm; Friday 8 am-6 pm; Saturday and Sunday 9 am-5 pm.     Clinic: [phone]   Pharmacy: [phone]      Treating Diarrhea    Diarrhea happens when you have loose, watery, or frequent bowel movements. It is a common problem with many causes. Most cases of diarrhea clear up on their own. But certain cases may need treatment. Be sure to see your healthcare provider if your symptoms do not improve within a few days.  Getting relief  Treatment of diarrhea depends on its cause. Diarrhea caused by bacterial or parasite infection is often treated with antibiotics. Diarrhea caused by other factors, such as a stomach virus, often improves with simple home treatment. The tips below may also help relieve your symptoms.    Drink plenty of fluids. This helps prevent too much fluid loss (dehydration). Water, clear soups, and electrolyte solutions are good choices. Avoid alcohol, coffee, tea, and milk. These can irritate your intestines and make symptoms worse.    Suck on ice chips if drinking makes you queasy.    Return to your normal diet slowly. You may want to eat bland foods at first, such as rice and toast. Also, you may need to avoid certain foods for a while, such as dairy products. These can make symptoms worse. Ask your healthcare provider if there are any other foods you should avoid.    If you were prescribed antibiotics, take them as directed.    Do not take anti-diarrhea medicines without asking your healthcare provider first.  Call your healthcare provider   Call your healthcare provider if you have any of the following:     A fever of 100.4 F (38.0 C) or higher, or as directed by your healthcare provider    Severe pain    Worsening diarrhea or diarrhea for more than 2 days    Bloody vomit or stool    Signs of dehydration (dizziness, dry mouth and tongue, rapid pulse, dark urine)  Date Last Reviewed: 7/1/2016 2000-2017 [employer]. [address], [place]  [address]. All rights reserved. This information is not intended as a substitute for professional medical care. Always follow your healthcare professional's instructions.        Diarrhea with Uncertain Cause (Adult)    Diarrhea is when stools are loose and watery. This can be caused by:    Viral infections    Bacterial infections    Food poisoning    Parasites    Irritable bowel syndrome (IBS)    Inflammatory bowel diseases such as ulcerative colitis, Crohn's disease, and celiac disease    Food intolerance, such as to lactose, the sugar found in milk and milk products    Reaction to medicines like antibiotics, laxatives, cancer drugs, and antacids  Along with diarrhea, you may also have:    Abdominal pain and cramping    Nausea and vomiting    Loss of bowel control    Fever and chills    Bloody stools  In some cases, antibiotics may help to treat diarrhea. You may have a stool sample test. This is done to see what is causing your diarrhea, and if antibiotics will help treat it. The results of a stool sample test may take up to 2 days. The healthcare provider may not give you antibiotics until he or she has the stool test results.  Diarrhea can cause dehydration. This is the loss of too much water and other fluids from the body. When this occurs, body fluid must be replaced. This can be done with oral rehydration solutions. Oral rehydration solutions are available at drugstores and grocery stores without a prescription.  Home care  Follow all instructions given by your healthcare provider. Rest at home for the next 24 hours, or until you feel better. Avoid caffeine, tobacco, and alcohol. These can make diarrhea, cramping, and pain worse.  If taking medicines:    Don t take over-the-counter diarrhea or nausea medicines unless your healthcare provider tells you to.    You may use acetaminophen or NSAID medicines like ibuprofen or naproxen to reduce pain and fever. Don t use these if you have chronic liver or kidney  disease, or ever had a stomach ulcer or gastrointestinal bleeding. Don't use NSAID medicines if you are already taking one for another condition (like arthritis) or are on daily aspirin therapy (such as for heart disease or after a stroke). Talk with your healthcare provider first.    If antibiotics were prescribed, be sure you take them until they are finished. Don t stop taking them even when you feel better. Antibiotics must be taken as a full course.  To prevent the spread of illness:    Remember that washing with soap and water and using alcohol-based  is the best way to prevent the spread of infection.    Clean the toilet after each use.    Wash your hands before eating.    Wash your hands before and after preparing food. Keep in mind that people with diarrhea or vomiting should not prepare food for others.    Wash your hands after using cutting boards, countertops, and knives that have been in contact with raw foods.    Wash and then peel fruits and vegetables.    Keep uncooked meats away from cooked and ready-to-eat foods.    Use a food thermometer when cooking. Cook poultry to at least 165 F (74 C). Cook ground meat (beef, veal, pork, lamb) to at least 160 F (71 C). Cook fresh beef, veal, lamb, and pork to at least 145 F (63 C).    Don t eat raw or undercooked eggs (poached or sid side up), poultry, meat, or unpasteurized milk and juices.  Food and drinks  The main goal while treating vomiting or diarrhea is to prevent dehydration. This is done by taking small amounts of liquids often.    Keep in mind that liquids are more important than food right now.    Drink only small amounts of liquids at a time.    Don t force yourself to eat, especially if you are having cramping, vomiting, or diarrhea. Don t eat large amounts at a time, even if you are hungry.    If you eat, avoid fatty, greasy, spicy, or fried foods.    Don t eat dairy foods or drink milk if you have diarrhea. These can make diarrhea  worse.  During the first 24 hours you can try:    Oral rehydration solutions. Do not use sports drinks. They have too much sugar and not enough electrolytes.    Soft drinks without caffeine    Ginger ale    Water (plain or flavored)    Decaf tea or coffee    Clear broth, consommé, or bouillon    Gelatin, popsicles, or frozen fruit juice bars  The second 24 hours, if you are feeling better, you can add:    Hot cereal, plain toast, bread, rolls, or crackers    Plain noodles, rice, mashed potatoes, chicken noodle soup, or rice soup    Unsweetened canned fruit (no pineapple)    Bananas  As you recover:    Limit fat intake to less than 15 grams per day. Don t eat margarine, butter, oils, mayonnaise, sauces, gravies, fried foods, peanut butter, meat, poultry, or fish.    Limit fiber. Don t eat raw or cooked vegetables, fresh fruits except bananas, or bran cereals.    Limit caffeine and chocolate.    Limit dairy.    Don t use spices or seasonings except salt.    Go back to your normal diet over time, as you feel better and your symptoms improve.    If the symptoms come back, go back to a simple diet or clear liquids.  Follow-up care  Follow up with your healthcare provider, or as advised. If a stool sample was taken or cultures were done, call the healthcare provider for the results as instructed.  Call 911  Call 911 if you have any of these symptoms:    Trouble breathing    Confusion    Extreme drowsiness or trouble walking    Loss of consciousness    Rapid heart rate    Chest pain    Stiff neck    Seizure  When to seek medical advice  Call your healthcare provider right away if any of these occur:    Abdominal pain that gets worse    Constant lower right abdominal pain    Continued vomiting and inability to keep liquids down    Diarrhea more than 5 times a day    Blood in vomit or stool    Dark urine or no urine for 8 hours, dry mouth and tongue, tiredness, weakness, or dizziness    Drowsiness    New rash    You don t  get better in 2 to 3 days    Fever of 100.4 F (38 C) or higher, or as directed by your healthcare provider  Date Last Reviewed: 1/3/2016    3067-8742 [employer]. [address] [place] [address], [place] [address]. All rights reserved. This information is not intended as a substitute for professional medical care. Always follow your healthcare professional's instructions.                Follow-ups after your visit        Your next 10 appointments already scheduled     Mar 08, 2019 11:00 AM CST   Return Visit with Catia Loredo MD   [facility] ([facility])    [address] [address] [place] [id number] [phone]              Future tests that were ordered for you today     Open Future Orders        Priority Expected Expires Ordered    Enteric Bacteria and Virus Panel by GISELL Stool Routine  9/7/2018 8/7/2018    Fecal Lactoferrin Routine  9/7/2018 8/7/2018    Clostridium difficile Toxin B PCR Routine  9/7/2018 8/7/2018    Adenovirus antigen, stool Routine  9/7/2018 8/7/2018            Who to contact     If you have questions or need follow up information about today's clinic visit or your schedule please contact [facility] directly at [phone].  Normal or non-critical lab and imaging results will be communicated to you by [employer], letter or phone within 4 business days after the clinic has received the results. If you do not hear from us within 7 days, please contact the clinic through [employer] or phone. If you have a critical or abnormal lab result, we will notify you by phone as soon as possible.  Submit refill requests through [email or web address] or call your pharmacy and they will forward the refill request to us. Please allow 3 business days for your refill to be completed.          Additional Information About Your Visit        [employer] Information     [email or web address] lets you send messages to your doctor, view your test results, renew your prescriptions, schedule  "appointments and more. To sign up, go to [email or web address] . Click on \"Log in\" on the left side of the screen, which will take you to the Welcome page. Then click on \"Sign up Now\" on the right side of the page.     You will be asked to enter the access code listed below, as well as some personal information. Please follow the directions to create your username and password.     Your access code is: [email or web address]  Expires: 10/14/2018  4:10 PM     Your access code will  in 90 days. If you need help or a new code, please call your [place] clinic or [phone].        Care EveryWhere ID     This is your Care EveryWhere ID. This could be used by other organizations to access your [place] medical records  [id number]        Your Vitals Were     Pulse Temperature Height Pulse Oximetry BMI (Body Mass Index)       58 97.4  F (36.3  C) (Oral) 1.746 m (5' 8.75\") 99% 31.24 kg/m2        Blood Pressure from Last 3 Encounters:   18 127/67   18 133/73   18 163/70    Weight from Last 3 Encounters:   18 95.3 kg (210 lb)   18 95.8 kg (211 lb 3.2 oz)   18 98.7 kg (217 lb 9.6 oz)               Primary Care Provider Office Phone # Fax #    Juan Carlos Jansen [phone]       [address]  [facility] [address]        Equal Access to Services     [facility]: Hadii laurent rodriguez hadasho Soshane, waaxda luqadaha, qaybta kaalmada jin, danae jones. So [facility] [phone].    ATENCIÓN: Si habla español, tiene a londono disposición servicios gratuitos de asistencia lingüística. Llame al [phone].    We comply with applicable federal civil rights laws and [place] laws. We do not discriminate on the basis of race, color, national origin, age, disability, sex, sexual orientation, or gender identity.            Thank you!     Thank you for choosing [facility]  for your care. Our goal is always to provide you with excellent " care. Hearing back from our patients is one way we can continue to improve our services. Please take a few minutes to complete the written survey that you may receive in the mail after your visit with us. Thank you!             Your Updated Medication List - Protect others around you: Learn how to safely use, store and throw away your medicines at www.disposemymeds.org.          This list is accurate as of 8/7/18 11:45 AM.  Always use your most recent med list.                   Brand Name Dispense Instructions for use Diagnosis    amLODIPine 5 MG tablet    NORVASC      Hypertension goal BP (blood pressure) < 140/90       aspirin 81 MG tablet      1 tablet daily*        chlorthalidone 25 MG tablet    HYGROTON    45 tablet    Take 0.5 tablets (12.5 mg) by mouth daily for blood pressure.    Benign hypertension with chronic kidney disease, stage III       cyanocobalamin 1000 MCG Tabs      1 tablet daily for foot neuropathy (vitamin B12).        fluticasone 50 MCG/ACT spray    FLONASE    1 Bottle    USE ONE TO TWO SPRAY(S) IN EACH NOSTRIL ONCE DAILY FOR  ALLERGY  PREVENTION    Perennial allergic rhinitis       indomethacin 50 MG capsule    INDOCIN    40 capsule    Take 1 capsule (50 mg) by mouth 3 times daily (with meals) as needed for gouty attack.    Gout       levothyroxine 88 MCG tablet    SYNTHROID/LEVOTHROID    90 tablet    Take 1 tablet (88 mcg) by mouth daily for thyroid.    Hypothyroidism due to acquired atrophy of thyroid       lisinopril 40 MG tablet    PRINIVIL/ZESTRIL    90 tablet    Take 1 tablet (40 mg) by mouth daily for blood pressure.    Benign hypertension with chronic kidney disease, stage III       lovastatin 20 MG tablet    MEVACOR    90 tablet    Take 1 tablet (20 mg) by mouth every evening for cholesterol.    Hyperlipidemia LDL goal <130       metoprolol succinate 25 MG 24 hr tablet    TOPROL-XL    90 tablet    Take 1 tablet daily for blood pressure.    Benign hypertension with chronic kidney  disease, stage III       omeprazole 20 MG CR capsule    priLOSEC    180 capsule    Take 2 capsules (40 mg) by mouth daily for reflux. Take 30-60 minutes before a meal.    Gastroesophageal reflux disease without esophagitis       PRESERVISION AREDS PO      Take 1 capsule by mouth 2 times daily (with meals) (PreserVision AREDS 2)        TYLENOL 500 MG tablet   Generic drug:  acetaminophen      Take 2 tablets by mouth 2 times daily as needed for pain (knee).        VITAMIN D (CHOLECALCIFEROL) PO      Take 1,000 Units by mouth daily

## 2018-08-07 NOTE — PATIENT INSTRUCTIONS
At [facility], we strive to deliver an exceptional experience to you, every time we see you.  If you receive a survey in the mail, please send us back your thoughts. We really do value your feedback.    Based on your medical history, these are the current health maintenance/preventive care services that you are due for (some may have been done at this visit.)  Health Maintenance Due   Topic Date Due     EYE EXAM Q1 YEAR  08/05/2016     PHQ-9 Q1YR  02/16/2018     MARJ QUESTIONNAIRE 1 YEAR  03/02/2018       Suggested websites for health information:  Www.Rentmetrics.Pond5 : Up to date and easily searchable information on multiple topics.  Www.ScoreStreak.gov : medication info, interactive tutorials, watch real surgeries online  Www.familydoctor.org : good info from the Academy of Family Physicians  Www.cdc.gov : public health info, travel advisories, epidemics (H1N1)  Www.aap.org : children's health info, normal development, vaccinations  Www.health.[facility].[place].us : [place] dept of health, public health issues in [place], N1N1    Your care team:                            Family Medicine Internal Medicine   MD Trino Lombardo MD Shantel Branch-Fleming, MD Katya Georgiev PA-C Megan Hill, APRN CNP    Sergo Rolon MD Pediatrics   Yimi Post, PAESME Schwarz, CNP MD Yolie Carlos APRN CNP   MD Chelo Ansari MD Deborah Mielke, MD Kim Thein, APRN [facility]      Clinic hours: Monday - Thursday 7 am-7 pm; Fridays 7 am-5 pm.   Urgent care: Monday - Friday 11 am-9 pm; Saturday and Sunday 9 am-5 pm.  Pharmacy : Monday -Thursday 8 am-8 pm; Friday 8 am-6 pm; Saturday and Sunday 9 am-5 pm.     Clinic: [phone]   Pharmacy: [phone]      Treating Diarrhea    Diarrhea happens when you have loose, watery, or frequent bowel movements. It is a common problem with many causes. Most cases of diarrhea clear up on their own. But certain cases may need treatment. Be sure to  see your healthcare provider if your symptoms do not improve within a few days.  Getting relief  Treatment of diarrhea depends on its cause. Diarrhea caused by bacterial or parasite infection is often treated with antibiotics. Diarrhea caused by other factors, such as a stomach virus, often improves with simple home treatment. The tips below may also help relieve your symptoms.    Drink plenty of fluids. This helps prevent too much fluid loss (dehydration). Water, clear soups, and electrolyte solutions are good choices. Avoid alcohol, coffee, tea, and milk. These can irritate your intestines and make symptoms worse.    Suck on ice chips if drinking makes you queasy.    Return to your normal diet slowly. You may want to eat bland foods at first, such as rice and toast. Also, you may need to avoid certain foods for a while, such as dairy products. These can make symptoms worse. Ask your healthcare provider if there are any other foods you should avoid.    If you were prescribed antibiotics, take them as directed.    Do not take anti-diarrhea medicines without asking your healthcare provider first.  Call your healthcare provider   Call your healthcare provider if you have any of the following:     A fever of 100.4 F (38.0 C) or higher, or as directed by your healthcare provider    Severe pain    Worsening diarrhea or diarrhea for more than 2 days    Bloody vomit or stool    Signs of dehydration (dizziness, dry mouth and tongue, rapid pulse, dark urine)  Date Last Reviewed: 7/1/2016 2000-2017 [employer]. [address] [place] [id number]. All rights reserved. This information is not intended as a substitute for professional medical care. Always follow your healthcare professional's instructions.        Diarrhea with Uncertain Cause (Adult)    Diarrhea is when stools are loose and watery. This can be caused by:    Viral infections    Bacterial infections    Food  poisoning    Parasites    Irritable bowel syndrome (IBS)    Inflammatory bowel diseases such as ulcerative colitis, Crohn's disease, and celiac disease    Food intolerance, such as to lactose, the sugar found in milk and milk products    Reaction to medicines like antibiotics, laxatives, cancer drugs, and antacids  Along with diarrhea, you may also have:    Abdominal pain and cramping    Nausea and vomiting    Loss of bowel control    Fever and chills    Bloody stools  In some cases, antibiotics may help to treat diarrhea. You may have a stool sample test. This is done to see what is causing your diarrhea, and if antibiotics will help treat it. The results of a stool sample test may take up to 2 days. The healthcare provider may not give you antibiotics until he or she has the stool test results.  Diarrhea can cause dehydration. This is the loss of too much water and other fluids from the body. When this occurs, body fluid must be replaced. This can be done with oral rehydration solutions. Oral rehydration solutions are available at drugstores and grocery stores without a prescription.  Home care  Follow all instructions given by your healthcare provider. Rest at home for the next 24 hours, or until you feel better. Avoid caffeine, tobacco, and alcohol. These can make diarrhea, cramping, and pain worse.  If taking medicines:    Don t take over-the-counter diarrhea or nausea medicines unless your healthcare provider tells you to.    You may use acetaminophen or NSAID medicines like ibuprofen or naproxen to reduce pain and fever. Don t use these if you have chronic liver or kidney disease, or ever had a stomach ulcer or gastrointestinal bleeding. Don't use NSAID medicines if you are already taking one for another condition (like arthritis) or are on daily aspirin therapy (such as for heart disease or after a stroke). Talk with your healthcare provider first.    If antibiotics were prescribed, be sure you take them  until they are finished. Don t stop taking them even when you feel better. Antibiotics must be taken as a full course.  To prevent the spread of illness:    Remember that washing with soap and water and using alcohol-based  is the best way to prevent the spread of infection.    Clean the toilet after each use.    Wash your hands before eating.    Wash your hands before and after preparing food. Keep in mind that people with diarrhea or vomiting should not prepare food for others.    Wash your hands after using cutting boards, countertops, and knives that have been in contact with raw foods.    Wash and then peel fruits and vegetables.    Keep uncooked meats away from cooked and ready-to-eat foods.    Use a food thermometer when cooking. Cook poultry to at least 165 F (74 C). Cook ground meat (beef, veal, pork, lamb) to at least 160 F (71 C). Cook fresh beef, veal, lamb, and pork to at least 145 F (63 C).    Don t eat raw or undercooked eggs (poached or sid side up), poultry, meat, or unpasteurized milk and juices.  Food and drinks  The main goal while treating vomiting or diarrhea is to prevent dehydration. This is done by taking small amounts of liquids often.    Keep in mind that liquids are more important than food right now.    Drink only small amounts of liquids at a time.    Don t force yourself to eat, especially if you are having cramping, vomiting, or diarrhea. Don t eat large amounts at a time, even if you are hungry.    If you eat, avoid fatty, greasy, spicy, or fried foods.    Don t eat dairy foods or drink milk if you have diarrhea. These can make diarrhea worse.  During the first 24 hours you can try:    Oral rehydration solutions. Do not use sports drinks. They have too much sugar and not enough electrolytes.    Soft drinks without caffeine    Ginger ale    Water (plain or flavored)    Decaf tea or coffee    Clear broth, consommé, or bouillon    Gelatin, popsicles, or frozen fruit juice  bars  The second 24 hours, if you are feeling better, you can add:    Hot cereal, plain toast, bread, rolls, or crackers    Plain noodles, rice, mashed potatoes, chicken noodle soup, or rice soup    Unsweetened canned fruit (no pineapple)    Bananas  As you recover:    Limit fat intake to less than 15 grams per day. Don t eat margarine, butter, oils, mayonnaise, sauces, gravies, fried foods, peanut butter, meat, poultry, or fish.    Limit fiber. Don t eat raw or cooked vegetables, fresh fruits except bananas, or bran cereals.    Limit caffeine and chocolate.    Limit dairy.    Don t use spices or seasonings except salt.    Go back to your normal diet over time, as you feel better and your symptoms improve.    If the symptoms come back, go back to a simple diet or clear liquids.  Follow-up care  Follow up with your healthcare provider, or as advised. If a stool sample was taken or cultures were done, call the healthcare provider for the results as instructed.  Call 911  Call 911 if you have any of these symptoms:    Trouble breathing    Confusion    Extreme drowsiness or trouble walking    Loss of consciousness    Rapid heart rate    Chest pain    Stiff neck    Seizure  When to seek medical advice  Call your healthcare provider right away if any of these occur:    Abdominal pain that gets worse    Constant lower right abdominal pain    Continued vomiting and inability to keep liquids down    Diarrhea more than 5 times a day    Blood in vomit or stool    Dark urine or no urine for 8 hours, dry mouth and tongue, tiredness, weakness, or dizziness    Drowsiness    New rash    You don t get better in 2 to 3 days    Fever of 100.4 F (38 C) or higher, or as directed by your healthcare provider  Date Last Reviewed: 1/3/2016    8147-0258 [employer]. [address] [place] [address], [place] [address]. All rights reserved. This information is not intended as a substitute for professional medical care. Always follow your  healthcare professional's instructions.

## 2018-08-08 DIAGNOSIS — R19.7 DIARRHEA, UNSPECIFIED TYPE: ICD-10-CM

## 2018-08-08 LAB
C DIFF TOX B STL QL: NEGATIVE
LACTOFERRIN STL QL IA: NEGATIVE
SPECIMEN SOURCE: NORMAL

## 2018-08-08 PROCEDURE — 83630 LACTOFERRIN FECAL (QUAL): CPT | Performed by: FAMILY MEDICINE

## 2018-08-08 PROCEDURE — 87449 NOS EACH ORGANISM AG IA: CPT | Performed by: FAMILY MEDICINE

## 2018-08-08 PROCEDURE — 87493 C DIFF AMPLIFIED PROBE: CPT | Performed by: FAMILY MEDICINE

## 2018-08-08 PROCEDURE — 87506 IADNA-DNA/RNA PROBE TQ 6-11: CPT | Performed by: FAMILY MEDICINE

## 2018-08-08 NOTE — LETTER
August 10, 2018      James Lama  [address]  [place] [address]        Mr. Lama,     All of your stool labs were normal/negative.     Please contact the clinic if you have additional questions.  Thank you.     Sincerely,       Juan Carlos Jansen MD    Resulted Orders   Enteric Bacteria and Virus Panel by GISELL Stool   Result Value Ref Range    Campylobacter group by GISELL Not Detected NDET^Not Detected    Salmonella species by GISELL Not Detected NDET^Not Detected    Shigella species by GISELL Not Detected NDET^Not Detected    Vibrio group by GISELL Not Detected NDET^Not Detected    Rotavirus A by GISELL Not Detected NDET^Not Detected    Shiga toxin 1 gene by GISELL Not Detected NDET^Not Detected    Shiga toxin 2 gene by GISELL Not Detected NDET^Not Detected    Norovirus I and II by GISELL Not Detected NDET^Not Detected    Yersinia enterocolitica by GISELL Not Detected NDET^Not Detected    Enteric pathogen comment       Testing performed by multiplexed, qualitative PCR using the [employer] Enteric   Pathogens Nucleic Acid Test. Results should not be used as the sole basis for diagnosis,   treatment, or other patient management decisions.        Comment:      Positive results do not rule out co-infection with other organisms that are   not detected by this test, and may not be the sole or definitive cause of   patient illness.   Negative results in the setting of clinical illness compatible with   gastroenteritis may be due to infection by pathogens that are not detected by   this test or non-infectious causes such as ulcerative colitis, irritable bowel   syndrome, or Crohn's disease.   Note: Shiga toxin producing E. coli (STEC) typically harbor one or both genes   that encode for Shiga toxins 1 and 2.     Fecal Lactoferrin   Result Value Ref Range    Fecal Lactoferrin Negative NEG^Negative      Comment:      Test may not be appropriate for immunocompromised patients.   Clostridium difficile Toxin B PCR    Result Value Ref Range    Specimen Description Feces     C Diff Toxin B PCR Negative NEG^Negative      Comment:      Negative: Clostridium difficile target DNA sequences NOT detected, presumed   negative for Clostridium difficile toxin B or the number of bacteria present   may be below the limit of detection for the test.  FDA approved assay performed using [employer] real-time PCR.  A negative result does not exclude actual disease due to Clostridium difficile   and may be due to improper collection, handling and storage of the specimen   or the number of organisms in the specimen is below the detection limit of the   assay.     Adenovirus antigen, stool   Result Value Ref Range    Adenovirus AGN Feces Negative NEG^Negative

## 2018-08-09 LAB
C COLI+JEJUNI+LARI FUSA STL QL NAA+PROBE: NOT DETECTED
EC STX1 GENE STL QL NAA+PROBE: NOT DETECTED
EC STX2 GENE STL QL NAA+PROBE: NOT DETECTED
ENTERIC PATHOGEN COMMENT: NORMAL
HADV AG STL QL IA: NEGATIVE
NOROV GI+II ORF1-ORF2 JNC STL QL NAA+PR: NOT DETECTED
RVA NSP5 STL QL NAA+PROBE: NOT DETECTED
SALMONELLA SP RPOD STL QL NAA+PROBE: NOT DETECTED
SHIGELLA SP+EIEC IPAH STL QL NAA+PROBE: NOT DETECTED
V CHOL+PARA RFBL+TRKH+TNAA STL QL NAA+PR: NOT DETECTED
Y ENTERO RECN STL QL NAA+PROBE: NOT DETECTED

## 2018-08-20 ENCOUNTER — RADIANT APPOINTMENT (OUTPATIENT)
Dept: GENERAL RADIOLOGY | Facility: CLINIC | Age: 83
End: 2018-08-20
Attending: NURSE PRACTITIONER
Payer: COMMERCIAL

## 2018-08-20 ENCOUNTER — OFFICE VISIT (OUTPATIENT)
Dept: FAMILY MEDICINE | Facility: CLINIC | Age: 83
End: 2018-08-20
Payer: COMMERCIAL

## 2018-08-20 VITALS
WEIGHT: 206 LBS | SYSTOLIC BLOOD PRESSURE: 120 MMHG | DIASTOLIC BLOOD PRESSURE: 67 MMHG | BODY MASS INDEX: 30.51 KG/M2 | HEIGHT: 69 IN | HEART RATE: 58 BPM | TEMPERATURE: 98.3 F | OXYGEN SATURATION: 99 %

## 2018-08-20 DIAGNOSIS — D64.9 ANEMIA, UNSPECIFIED TYPE: ICD-10-CM

## 2018-08-20 DIAGNOSIS — K59.00 CONSTIPATION, UNSPECIFIED CONSTIPATION TYPE: ICD-10-CM

## 2018-08-20 DIAGNOSIS — G31.84 COGNITIVE IMPAIRMENT, MILD, SO STATED: ICD-10-CM

## 2018-08-20 DIAGNOSIS — R11.0 NAUSEA: ICD-10-CM

## 2018-08-20 DIAGNOSIS — R11.0 NAUSEA: Primary | ICD-10-CM

## 2018-08-20 DIAGNOSIS — E87.1 HYPONATREMIA: ICD-10-CM

## 2018-08-20 DIAGNOSIS — N18.30 BENIGN HYPERTENSION WITH CHRONIC KIDNEY DISEASE, STAGE III (H): ICD-10-CM

## 2018-08-20 DIAGNOSIS — I12.9 BENIGN HYPERTENSION WITH CHRONIC KIDNEY DISEASE, STAGE III (H): ICD-10-CM

## 2018-08-20 LAB
ALBUMIN UR-MCNC: NEGATIVE MG/DL
ANION GAP SERPL CALCULATED.3IONS-SCNC: 7 MMOL/L (ref 3–14)
APPEARANCE UR: CLEAR
BASOPHILS # BLD AUTO: 0 10E9/L (ref 0–0.2)
BASOPHILS NFR BLD AUTO: 0.1 %
BILIRUB UR QL STRIP: NEGATIVE
BUN SERPL-MCNC: 22 MG/DL (ref 7–30)
CALCIUM SERPL-MCNC: 9 MG/DL (ref 8.5–10.1)
CHLORIDE SERPL-SCNC: 95 MMOL/L (ref 94–109)
CO2 SERPL-SCNC: 28 MMOL/L (ref 20–32)
COLOR UR AUTO: YELLOW
CREAT SERPL-MCNC: 1.21 MG/DL (ref 0.66–1.25)
DIFFERENTIAL METHOD BLD: ABNORMAL
EOSINOPHIL # BLD AUTO: 0.5 10E9/L (ref 0–0.7)
EOSINOPHIL NFR BLD AUTO: 4.2 %
ERYTHROCYTE [DISTWIDTH] IN BLOOD BY AUTOMATED COUNT: 12.6 % (ref 10–15)
ERYTHROCYTE [DISTWIDTH] IN BLOOD BY AUTOMATED COUNT: 12.6 % (ref 10–15)
GFR SERPL CREATININE-BSD FRML MDRD: 56 ML/MIN/1.7M2
GLUCOSE SERPL-MCNC: 102 MG/DL (ref 70–99)
GLUCOSE UR STRIP-MCNC: NEGATIVE MG/DL
HCT VFR BLD AUTO: 38.1 % (ref 40–53)
HCT VFR BLD AUTO: 38.1 % (ref 40–53)
HGB BLD-MCNC: 13.1 G/DL (ref 13.3–17.7)
HGB BLD-MCNC: 13.1 G/DL (ref 13.3–17.7)
HGB UR QL STRIP: NEGATIVE
KETONES UR STRIP-MCNC: NEGATIVE MG/DL
LEUKOCYTE ESTERASE UR QL STRIP: NEGATIVE
LYMPHOCYTES # BLD AUTO: 2.2 10E9/L (ref 0.8–5.3)
LYMPHOCYTES NFR BLD AUTO: 19.6 %
MCH RBC QN AUTO: 31.6 PG (ref 26.5–33)
MCH RBC QN AUTO: 31.6 PG (ref 26.5–33)
MCHC RBC AUTO-ENTMCNC: 34.4 G/DL (ref 31.5–36.5)
MCHC RBC AUTO-ENTMCNC: 34.4 G/DL (ref 31.5–36.5)
MCV RBC AUTO: 92 FL (ref 78–100)
MCV RBC AUTO: 92 FL (ref 78–100)
MONOCYTES # BLD AUTO: 0.9 10E9/L (ref 0–1.3)
MONOCYTES NFR BLD AUTO: 7.7 %
NEUTROPHILS # BLD AUTO: 7.8 10E9/L (ref 1.6–8.3)
NEUTROPHILS NFR BLD AUTO: 68.4 %
NITRATE UR QL: NEGATIVE
PH UR STRIP: 6 PH (ref 5–7)
PLATELET # BLD AUTO: 167 10E9/L (ref 150–450)
PLATELET # BLD AUTO: 167 10E9/L (ref 150–450)
POTASSIUM SERPL-SCNC: 4.6 MMOL/L (ref 3.4–5.3)
RBC # BLD AUTO: 4.14 10E12/L (ref 4.4–5.9)
RBC # BLD AUTO: 4.14 10E12/L (ref 4.4–5.9)
SODIUM SERPL-SCNC: 130 MMOL/L (ref 133–144)
SOURCE: NORMAL
SP GR UR STRIP: 1.01 (ref 1–1.03)
UROBILINOGEN UR STRIP-ACNC: 0.2 EU/DL (ref 0.2–1)
WBC # BLD AUTO: 11.4 10E9/L (ref 4–11)
WBC # BLD AUTO: ABNORMAL 10E9/L (ref 4–11)

## 2018-08-20 PROCEDURE — 85025 COMPLETE CBC W/AUTO DIFF WBC: CPT | Performed by: NURSE PRACTITIONER

## 2018-08-20 PROCEDURE — 99214 OFFICE O/P EST MOD 30 MIN: CPT | Performed by: NURSE PRACTITIONER

## 2018-08-20 PROCEDURE — 36415 COLL VENOUS BLD VENIPUNCTURE: CPT | Performed by: NURSE PRACTITIONER

## 2018-08-20 PROCEDURE — 80048 BASIC METABOLIC PNL TOTAL CA: CPT | Performed by: NURSE PRACTITIONER

## 2018-08-20 PROCEDURE — 81003 URINALYSIS AUTO W/O SCOPE: CPT | Performed by: NURSE PRACTITIONER

## 2018-08-20 PROCEDURE — 74019 RADEX ABDOMEN 2 VIEWS: CPT | Mod: FY

## 2018-08-20 NOTE — PATIENT INSTRUCTIONS
At [facility], we strive to deliver an exceptional experience to you, every time we see you.  If you receive a survey in the mail, please send us back your thoughts. We really do value your feedback.    Based on your medical history, these are the current health maintenance/preventive care services that you are due for (some may have been done at this visit.)  There are no preventive care reminders to display for this patient.    Suggested websites for health information:  Www.[facility].org : Up to date and easily searchable information on multiple topics.  Www.medlineplus.gov : medication info, interactive tutorials, watch real surgeries online  Www.familydoctor.org : good info from the Academy of Family Physicians  Www.cdc.gov : public health info, travel advisories, epidemics (H1N1)  Www.aap.org : children's health info, normal development, vaccinations  Www.health.[facility].[place].us : [place] dept of health, public health issues in [place], N1N1    Your care team:                            Family Medicine Internal Medicine   MD Trino Lombardo MD Shantel Branch-Fleming, MD Katya Georgiev PA-C Megan Hill, APRN CNP    Sergo Rolon MD Pediatrics   Yimi Post, PAChayC  Chitra Schwarz, CNP MD Yolie Carlos APRN CNP   MD Chelo Ansari MD Deborah Mielke, MD Kim Thein, APRN CNP      Clinic hours: Monday - Thursday 7 am-7 pm; Fridays 7 am-5 pm.   Urgent care: Monday - Friday 11 am-9 pm; Saturday and Sunday 9 am-5 pm.  Pharmacy : Monday -Thursday 8 am-8 pm; Friday 8 am-6 pm; Saturday and Sunday 9 am-5 pm.     Clinic: [phone]   Pharmacy: [phone]        Diet for Vomiting or Diarrhea (Adult)    Your symptoms may return or get worse after eating certain foods listed below. If this happens, stop eating these foods until your symptoms ease and you feel better.  Once the vomiting stops, follow the steps below.   During the first 12 to 24 hours  During the  first 12 to 24 hours, follow this diet:    Drinks. Plain water, sport drinks like electrolyte solutions, soft drinks without caffeine, mineral water (plain or flavored), clear fruit juices, and decaffeinated tea and coffee.    Soups. Clear broth.    Desserts. Plain gelatin, popsicles, and fruit juice bars. As you feel better, you may add 6 to 8 ounces of yogurt per day. If you have diarrhea, don't have foods or drinks that contain sugar, high-fructose corn syrup, or sugar alcohols.  During the next 24 hours  During the next 24 hours you may add the following to the above:    Hot cereal, plain toast, bread, rolls, and crackers    Plain noodles, rice, mashed potatoes, and chicken noodle or rice soup    Unsweetened canned fruit (but not pineapple) and bananas  Don't eat more than 15 grams of fat a day. Do this by staying away from margarine, butter, oils, mayonnaise, sauces, gravies, fried foods, peanut butter, meat, poultry, and fish.  Don't eat much fiber. Stay away from raw or cooked vegetables, fresh fruits (except bananas), and bran cereals.  Limit how much caffeine and chocolate you have. Do not use any spices or seasonings except salt.  During the next 24 hours  Slowly go back to your normal diet, as you feel better and your symptoms ease.  Date Last Reviewed: 8/1/2016 2000-2017 [employer]. [address] [place] [address], [place] [address]. All rights reserved. This information is not intended as a substitute for professional medical care. Always follow your healthcare professional's instructions.

## 2018-08-20 NOTE — MR AVS SNAPSHOT
After Visit Summary   8/20/2018    James Lama    MRN: [id number]           Patient Information     Date Of Birth          6/28/1927        Visit Information        Provider Department      8/20/2018 3:40 PM Collette Walker APRN CNP [facility]        Today's Diagnoses     Nausea    -  1    Benign hypertension with chronic kidney disease, stage III        Cognitive impairment, mild, so stated          Care Instructions    At [facility], we strive to deliver an exceptional experience to you, every time we see you.  If you receive a survey in the mail, please send us back your thoughts. We really do value your feedback.    Based on your medical history, these are the current health maintenance/preventive care services that you are due for (some may have been done at this visit.)  There are no preventive care reminders to display for this patient.    Suggested websites for health information:  Www.dynaTrace software : Up to date and easily searchable information on multiple topics.  Www.medlineplus.gov : medication info, interactive tutorials, watch real surgeries online  Www.familydoctor.org : good info from the Academy of Family Physicians  Www.cdc.gov : public health info, travel advisories, epidemics (H1N1)  Www.aap.org : children's health info, normal development, vaccinations  Www.health.[facility].[place].us : [place] dept of health, public health issues in [place], N1N1    Your care team:                            Family Medicine Internal Medicine   MD Trino Lombardo MD Shantel Branch-Fleming, MD Katya Georgiev PA-C Megan Hill, APRN CNP Nam Ho, MD Pediatrics   WERO Martinez CNP Paula Brito, MD Amelia Massimini APRN MD Chelo Gilliam MD Deborah Mielke, MD Kim Thein, APRN CNP      Clinic hours: Monday - Thursday 7 am-7 pm; Fridays 7 am-5 pm.   Urgent care: Monday - Friday 11 am-9 pm; Saturday and Sunday 9  am-5 pm.  Pharmacy : Monday -Thursday 8 am-8 pm; Friday 8 am-6 pm; Saturday and Sunday 9 am-5 pm.     Clinic: [phone]   Pharmacy: [phone]        Diet for Vomiting or Diarrhea (Adult)    Your symptoms may return or get worse after eating certain foods listed below. If this happens, stop eating these foods until your symptoms ease and you feel better.  Once the vomiting stops, follow the steps below.   During the first 12 to 24 hours  During the first 12 to 24 hours, follow this diet:    Drinks. Plain water, sport drinks like electrolyte solutions, soft drinks without caffeine, mineral water (plain or flavored), clear fruit juices, and decaffeinated tea and coffee.    Soups. Clear broth.    Desserts. Plain gelatin, popsicles, and fruit juice bars. As you feel better, you may add 6 to 8 ounces of yogurt per day. If you have diarrhea, don't have foods or drinks that contain sugar, high-fructose corn syrup, or sugar alcohols.  During the next 24 hours  During the next 24 hours you may add the following to the above:    Hot cereal, plain toast, bread, rolls, and crackers    Plain noodles, rice, mashed potatoes, and chicken noodle or rice soup    Unsweetened canned fruit (but not pineapple) and bananas  Don't eat more than 15 grams of fat a day. Do this by staying away from margarine, butter, oils, mayonnaise, sauces, gravies, fried foods, peanut butter, meat, poultry, and fish.  Don't eat much fiber. Stay away from raw or cooked vegetables, fresh fruits (except bananas), and bran cereals.  Limit how much caffeine and chocolate you have. Do not use any spices or seasonings except salt.  During the next 24 hours  Slowly go back to your normal diet, as you feel better and your symptoms ease.  Date Last Reviewed: 8/1/2016 2000-2017 [employer]. [address] [place] [address], [place] [address]. All rights reserved. This information is not intended as a substitute for professional medical care. Always  "follow your healthcare professional's instructions.                Follow-ups after your visit        Your next 10 appointments already scheduled     Mar 08, 2019 11:00 AM CST   Return Visit with Catia [facility]o MD   [facility]    [address] [place] [address] [phone]              Future tests that were ordered for you today     Open Future Orders        Priority Expected Expires Ordered    XR KUB Routine 2018            Who to contact     If you have questions or need follow up information about today's clinic visit or your schedule [facility] at [phone].  Normal or non-critical lab and imaging results will be communicated to you by [employer], letter or phone within 4 business days after the clinic has received the results. If you do not hear from us within 7 days, please contact the clinic through [employer] or phone. If you have a critical or abnormal lab result, we will notify you by phone as soon as possible.  Submit refill requests through [employer] or call your pharmacy and they will forward the refill request to us. Please allow 3 business days for your refill to be completed.          Additional Information About Your Visit        [employer] Information     [employer] lets you send messages to your doctor, view your test results, renew your prescriptions, schedule appointments and more. To sign up, go to [email or web address] . Click on \"Log in\" on the left side of the screen, which will take you to the Welcome page. Then click on \"Sign up Now\" on the right side of the page.     You will be asked to enter the access code listed below, as well as some personal information. Please follow the directions to create your username and password.     Your access code is: [email or web address]  Expires: 10/14/2018  4:10 PM     Your access code will  in 90 days. If you need help or a new " "code, please call your [place] clinic or [phone].        Care EveryWhere ID     This is your Care EveryWhere ID. This could be used by other organizations to access your [place] medical records  [id number]        Your Vitals Were     Pulse Temperature Height Pulse Oximetry BMI (Body Mass Index)       58 98.3  F (36.8  C) (Oral) 5' 8.75\" (1.746 m) 99% 30.64 kg/m2        Blood Pressure from Last 3 Encounters:   08/20/18 120/67   08/07/18 127/67   08/06/18 133/73    Weight from Last 3 Encounters:   08/20/18 206 lb (93.4 kg)   08/07/18 210 lb (95.3 kg)   08/06/18 211 lb 3.2 oz (95.8 kg)              We Performed the Following     Basic metabolic panel  (Ca, Cl, CO2, Creat, Gluc, K, Na, BUN)     CBC with platelets     UA reflex to Microscopic and Culture          Today's Medication Changes          These changes are accurate as of 8/20/18  4:13 PM.  If you have any questions, ask your nurse or doctor.               Stop taking these medicines if you haven't already. Please contact your care team if you have questions.     amLODIPine 5 MG tablet   Commonly known as:  NORVASC   Stopped by:  Collette Walker APRN CNP           chlorthalidone 25 MG tablet   Commonly known as:  HYGROTON   Stopped by:  Collette Walker APRN CNP                    Primary Care Provider Office Phone # Fax #    Juan Carlos Jansen [phone]       [address]  [facility] [address]        Equal Access to Services     [facility]: Hadii laurent rodriguez hadasho Soomaali, waaxda luqadaha, qaybta kaalmada jin, danae mckee . So [facility] [phone].    ATENCIÓN: Si habla español, tiene a londono disposición servicios gratuitos de asistencia lingüística. Llame al [phone].    We comply with applicable federal civil rights laws and [place] laws. We do not discriminate on the basis of race, color, national origin, age, disability, sex, sexual orientation, or gender identity.            Thank " you!     Thank you for choosing [facility]  for your care. Our goal is always to provide you with excellent care. Hearing back from our patients is one way we can continue to improve our services. Please take a few minutes to complete the written survey that you may receive in the mail after your visit with us. Thank you!             Your Updated Medication List - Protect others around you: Learn how to safely use, store and throw away your medicines at www.disposemymeds.org.          This list is accurate as of 8/20/18  4:13 PM.  Always use your most recent med list.                   Brand Name Dispense Instructions for use Diagnosis    aspirin 81 MG tablet      1 tablet daily*        cyanocobalamin 1000 MCG Tabs      1 tablet daily for foot neuropathy (vitamin B12).        fluticasone 50 MCG/ACT spray    FLONASE    1 Bottle    USE ONE TO TWO SPRAY(S) IN EACH NOSTRIL ONCE DAILY FOR  ALLERGY  PREVENTION    Perennial allergic rhinitis       indomethacin 50 MG capsule    INDOCIN    40 capsule    Take 1 capsule (50 mg) by mouth 3 times daily (with meals) as needed for gouty attack.    Gout       levothyroxine 88 MCG tablet    SYNTHROID/LEVOTHROID    90 tablet    Take 1 tablet (88 mcg) by mouth daily for thyroid.    Hypothyroidism due to acquired atrophy of thyroid       lisinopril 40 MG tablet    PRINIVIL/ZESTRIL    90 tablet    Take 1 tablet (40 mg) by mouth daily for blood pressure.    Benign hypertension with chronic kidney disease, stage III       lovastatin 20 MG tablet    MEVACOR    90 tablet    Take 1 tablet (20 mg) by mouth every evening for cholesterol.    Hyperlipidemia LDL goal <130       metoprolol succinate 25 MG 24 hr tablet    TOPROL-XL    90 tablet    Take 1 tablet daily for blood pressure.    Benign hypertension with chronic kidney disease, stage III       omeprazole 20 MG CR capsule    priLOSEC    180 capsule    Take 2 capsules (40 mg) by mouth daily for reflux. Take 30-60 minutes  before a meal.    Gastroesophageal reflux disease without esophagitis       PRESERVISION AREDS PO      Take 1 capsule by mouth 2 times daily (with meals) (PreserVision AREDS 2)        TYLENOL 500 MG tablet   Generic drug:  acetaminophen      Take 2 tablets by mouth 2 times daily as needed for pain (knee).        VITAMIN D (CHOLECALCIFEROL) PO      Take 1,000 Units by mouth daily

## 2018-08-20 NOTE — PROGRESS NOTES
"  SUBJECTIVE:   James Lama is a 89 year old male who presents to clinic today for the following health issues:    Diarrhea      Duration: ***    Description:       Consistency of stool: {description:147462}       Blood in stool: { :342099}       Number of loose stools past 24 hours: ***    Intensity:  {mild,moderate,severe:275809}    Accompanying signs and symptoms:       Fever: { :722342}       Nausea/vomitting: { :116914}       Abdominal pain: { :635594}       Weight loss: { :916456}    History (recent antibiotics or travel/ill contacts/med changes/testing done): ***    Precipitating or alleviating factors: {NONEORCHOOSE:897648::\"None\"}    Therapies tried and outcome: {DIARRHEA THERAPY 2:890540}      {additional problems for provider to add:648351}    Problem list and histories reviewed & adjusted, as indicated.  Additional history: {NONE - AS DOCUMENTED:333706::\"as documented\"}    {HIST REVIEW/ LINKS 2:647837}    Reviewed and updated as needed this visit by clinical staff       Reviewed and updated as needed this visit by Provider         {PROVIDER CHARTING PREFERENCE:034178}  "

## 2018-08-21 ENCOUNTER — TELEPHONE (OUTPATIENT)
Dept: FAMILY MEDICINE | Facility: CLINIC | Age: 83
End: 2018-08-21

## 2018-08-22 PROBLEM — E87.1 HYPONATREMIA: Status: ACTIVE | Noted: 2018-08-22

## 2018-08-22 RX ORDER — POLYETHYLENE GLYCOL 3350 17 G/17G
1 POWDER, FOR SOLUTION ORAL DAILY
Qty: 510 G | Refills: 1 | Status: SHIPPED | OUTPATIENT
Start: 2018-08-22

## 2018-08-24 DIAGNOSIS — D64.9 ANEMIA, UNSPECIFIED TYPE: ICD-10-CM

## 2018-08-24 DIAGNOSIS — E87.1 HYPONATREMIA: ICD-10-CM

## 2018-08-24 LAB
ANION GAP SERPL CALCULATED.3IONS-SCNC: 6 MMOL/L (ref 3–14)
BUN SERPL-MCNC: 17 MG/DL (ref 7–30)
CALCIUM SERPL-MCNC: 9.2 MG/DL (ref 8.5–10.1)
CHLORIDE SERPL-SCNC: 99 MMOL/L (ref 94–109)
CO2 SERPL-SCNC: 29 MMOL/L (ref 20–32)
CREAT SERPL-MCNC: 1.13 MG/DL (ref 0.66–1.25)
FOLATE SERPL-MCNC: 18.6 NG/ML
GFR SERPL CREATININE-BSD FRML MDRD: 61 ML/MIN/1.7M2
GLUCOSE SERPL-MCNC: 65 MG/DL (ref 70–99)
POTASSIUM SERPL-SCNC: 4.4 MMOL/L (ref 3.4–5.3)
SODIUM SERPL-SCNC: 134 MMOL/L (ref 133–144)
VIT B12 SERPL-MCNC: 1106 PG/ML (ref 193–986)

## 2018-08-24 PROCEDURE — 36415 COLL VENOUS BLD VENIPUNCTURE: CPT | Performed by: NURSE PRACTITIONER

## 2018-08-24 PROCEDURE — 80048 BASIC METABOLIC PNL TOTAL CA: CPT | Performed by: NURSE PRACTITIONER

## 2018-08-24 PROCEDURE — 82746 ASSAY OF FOLIC ACID SERUM: CPT | Performed by: NURSE PRACTITIONER

## 2018-08-24 PROCEDURE — 82607 VITAMIN B-12: CPT | Performed by: NURSE PRACTITIONER

## 2018-09-05 DIAGNOSIS — N18.30 BENIGN HYPERTENSION WITH CHRONIC KIDNEY DISEASE, STAGE III (H): ICD-10-CM

## 2018-09-05 DIAGNOSIS — I12.9 BENIGN HYPERTENSION WITH CHRONIC KIDNEY DISEASE, STAGE III (H): ICD-10-CM

## 2018-09-05 RX ORDER — LISINOPRIL 40 MG/1
TABLET ORAL
Qty: 90 TABLET | Refills: 1 | Status: CANCELLED | OUTPATIENT
Start: 2018-09-05

## 2018-09-05 NOTE — TELEPHONE ENCOUNTER
"Requested Prescriptions   Pending Prescriptions Disp Refills     lisinopril (PRINIVIL/ZESTRIL) 40 MG tablet [Pharmacy Med Name: LISINOPRIL 40MG     TAB]  Last Written Prescription Date:  03/08/18  Last Fill Quantity: 90,  # refills: 1   Last Office Visit with G, P or [facility] prescribing provider:  08/20/18-Thein   Future Office Visit:    90 tablet 1     Sig: TAKE 1 TABLET BY MOUTH ONCE DAILY FOR BLOOD PRESSURE    ACE Inhibitors (Including Combos) Protocol Passed    9/5/2018  8:26 AM       Passed - Blood pressure under 140/90 in past 12 months    BP Readings from Last 3 Encounters:   08/20/18 120/67   08/07/18 127/67   08/06/18 133/73                Passed - Recent (12 mo) or future (30 days) visit within the authorizing provider's specialty    Patient had office visit in the last 12 months or has a visit in the next 30 days with authorizing provider or within the authorizing provider's specialty.  See \"Patient Info\" tab in inbasket, or \"Choose Columns\" in Meds & Orders section of the refill encounter.           Passed - Patient is age 18 or older       Passed - Normal serum creatinine on file in past 12 months    Recent Labs   Lab Test  08/24/18   1034   02/28/11   1441   CR  1.13   < >   --    CRPOC   --    --   1.1    < > = values in this interval not displayed.            Passed - Normal serum potassium on file in past 12 months    Recent Labs   Lab Test  08/24/18   1034   POTASSIUM  4.4               "

## 2018-09-10 ENCOUNTER — OFFICE VISIT (OUTPATIENT)
Dept: FAMILY MEDICINE | Facility: CLINIC | Age: 83
End: 2018-09-10
Payer: COMMERCIAL

## 2018-09-10 VITALS
BODY MASS INDEX: 31.1 KG/M2 | HEIGHT: 69 IN | OXYGEN SATURATION: 98 % | WEIGHT: 210 LBS | SYSTOLIC BLOOD PRESSURE: 134 MMHG | DIASTOLIC BLOOD PRESSURE: 56 MMHG | TEMPERATURE: 97.4 F | HEART RATE: 64 BPM | RESPIRATION RATE: 16 BRPM

## 2018-09-10 DIAGNOSIS — I12.9 BENIGN HYPERTENSION WITH CHRONIC KIDNEY DISEASE, STAGE III (H): ICD-10-CM

## 2018-09-10 DIAGNOSIS — R58 MULTIPLE ECCHYMOSES OF BOTH UPPER ARMS: Primary | ICD-10-CM

## 2018-09-10 DIAGNOSIS — E78.5 HYPERLIPIDEMIA LDL GOAL <130: ICD-10-CM

## 2018-09-10 DIAGNOSIS — K21.9 GASTROESOPHAGEAL REFLUX DISEASE WITHOUT ESOPHAGITIS: ICD-10-CM

## 2018-09-10 DIAGNOSIS — E03.4 HYPOTHYROIDISM DUE TO ACQUIRED ATROPHY OF THYROID: ICD-10-CM

## 2018-09-10 DIAGNOSIS — N18.30 BENIGN HYPERTENSION WITH CHRONIC KIDNEY DISEASE, STAGE III (H): ICD-10-CM

## 2018-09-10 DIAGNOSIS — Z23 NEED FOR PROPHYLACTIC VACCINATION AND INOCULATION AGAINST INFLUENZA: ICD-10-CM

## 2018-09-10 DIAGNOSIS — D64.9 ANEMIA, UNSPECIFIED TYPE: ICD-10-CM

## 2018-09-10 LAB
ALT SERPL W P-5'-P-CCNC: 24 U/L (ref 0–70)
BASOPHILS # BLD AUTO: 0 10E9/L (ref 0–0.2)
BASOPHILS NFR BLD AUTO: 0.2 %
CHOLEST SERPL-MCNC: 162 MG/DL
DIFFERENTIAL METHOD BLD: ABNORMAL
EOSINOPHIL # BLD AUTO: 0.3 10E9/L (ref 0–0.7)
EOSINOPHIL NFR BLD AUTO: 2.7 %
ERYTHROCYTE [DISTWIDTH] IN BLOOD BY AUTOMATED COUNT: 14.1 % (ref 10–15)
ERYTHROCYTE [DISTWIDTH] IN BLOOD BY AUTOMATED COUNT: 14.1 % (ref 10–15)
HCT VFR BLD AUTO: 37 % (ref 40–53)
HCT VFR BLD AUTO: 37.1 % (ref 40–53)
HDLC SERPL-MCNC: 45 MG/DL
HGB BLD-MCNC: 12 G/DL (ref 13.3–17.7)
HGB BLD-MCNC: 12 G/DL (ref 13.3–17.7)
INR PPP: 1 (ref 0.86–1.14)
LDLC SERPL CALC-MCNC: 63 MG/DL
LYMPHOCYTES # BLD AUTO: 2.5 10E9/L (ref 0.8–5.3)
LYMPHOCYTES NFR BLD AUTO: 24.9 %
MCH RBC QN AUTO: 31.5 PG (ref 26.5–33)
MCH RBC QN AUTO: 31.6 PG (ref 26.5–33)
MCHC RBC AUTO-ENTMCNC: 32.3 G/DL (ref 31.5–36.5)
MCHC RBC AUTO-ENTMCNC: 32.4 G/DL (ref 31.5–36.5)
MCV RBC AUTO: 97 FL (ref 78–100)
MCV RBC AUTO: 98 FL (ref 78–100)
MONOCYTES # BLD AUTO: 0.8 10E9/L (ref 0–1.3)
MONOCYTES NFR BLD AUTO: 7.6 %
NEUTROPHILS # BLD AUTO: 6.6 10E9/L (ref 1.6–8.3)
NEUTROPHILS NFR BLD AUTO: 64.6 %
NONHDLC SERPL-MCNC: 117 MG/DL
PLATELET # BLD AUTO: 215 10E9/L (ref 150–450)
PLATELET # BLD AUTO: 223 10E9/L (ref 150–450)
RBC # BLD AUTO: 3.8 10E12/L (ref 4.4–5.9)
RBC # BLD AUTO: 3.81 10E12/L (ref 4.4–5.9)
TRIGL SERPL-MCNC: 272 MG/DL
WBC # BLD AUTO: 10.2 10E9/L (ref 4–11)
WBC # BLD AUTO: 10.5 10E9/L (ref 4–11)

## 2018-09-10 PROCEDURE — 85060 BLOOD SMEAR INTERPRETATION: CPT | Performed by: FAMILY MEDICINE

## 2018-09-10 PROCEDURE — 83550 IRON BINDING TEST: CPT | Performed by: FAMILY MEDICINE

## 2018-09-10 PROCEDURE — G0008 ADMIN INFLUENZA VIRUS VAC: HCPCS | Performed by: FAMILY MEDICINE

## 2018-09-10 PROCEDURE — 90662 IIV NO PRSV INCREASED AG IM: CPT | Performed by: FAMILY MEDICINE

## 2018-09-10 PROCEDURE — 80061 LIPID PANEL: CPT | Performed by: FAMILY MEDICINE

## 2018-09-10 PROCEDURE — 85045 AUTOMATED RETICULOCYTE COUNT: CPT | Performed by: FAMILY MEDICINE

## 2018-09-10 PROCEDURE — 82607 VITAMIN B-12: CPT | Performed by: FAMILY MEDICINE

## 2018-09-10 PROCEDURE — 99214 OFFICE O/P EST MOD 30 MIN: CPT | Mod: 25 | Performed by: FAMILY MEDICINE

## 2018-09-10 PROCEDURE — 84460 ALANINE AMINO (ALT) (SGPT): CPT | Performed by: FAMILY MEDICINE

## 2018-09-10 PROCEDURE — 36415 COLL VENOUS BLD VENIPUNCTURE: CPT | Performed by: FAMILY MEDICINE

## 2018-09-10 PROCEDURE — 85025 COMPLETE CBC W/AUTO DIFF WBC: CPT | Performed by: FAMILY MEDICINE

## 2018-09-10 PROCEDURE — 99000 SPECIMEN HANDLING OFFICE-LAB: CPT | Performed by: FAMILY MEDICINE

## 2018-09-10 PROCEDURE — 82728 ASSAY OF FERRITIN: CPT | Performed by: FAMILY MEDICINE

## 2018-09-10 PROCEDURE — 83921 ORGANIC ACID SINGLE QUANT: CPT | Mod: 90 | Performed by: FAMILY MEDICINE

## 2018-09-10 PROCEDURE — 85610 PROTHROMBIN TIME: CPT | Performed by: FAMILY MEDICINE

## 2018-09-10 PROCEDURE — 83540 ASSAY OF IRON: CPT | Performed by: FAMILY MEDICINE

## 2018-09-10 PROCEDURE — 82668 ASSAY OF ERYTHROPOIETIN: CPT | Mod: 90 | Performed by: FAMILY MEDICINE

## 2018-09-10 PROCEDURE — 85027 COMPLETE CBC AUTOMATED: CPT | Performed by: FAMILY MEDICINE

## 2018-09-10 PROCEDURE — 82746 ASSAY OF FOLIC ACID SERUM: CPT | Performed by: FAMILY MEDICINE

## 2018-09-10 RX ORDER — METOPROLOL SUCCINATE 25 MG/1
TABLET, EXTENDED RELEASE ORAL
Qty: 90 TABLET | Refills: 1 | Status: SHIPPED | OUTPATIENT
Start: 2018-09-10 | End: 2019-02-21

## 2018-09-10 RX ORDER — LOVASTATIN 20 MG
20 TABLET ORAL EVERY EVENING
Qty: 90 TABLET | Refills: 1 | Status: SHIPPED | OUTPATIENT
Start: 2018-09-10 | End: 2019-02-21

## 2018-09-10 RX ORDER — LISINOPRIL 40 MG/1
40 TABLET ORAL DAILY
Qty: 90 TABLET | Refills: 1 | Status: SHIPPED | OUTPATIENT
Start: 2018-09-10 | End: 2019-02-21

## 2018-09-10 RX ORDER — LEVOTHYROXINE SODIUM 88 UG/1
88 TABLET ORAL DAILY
Qty: 90 TABLET | Refills: 1 | Status: SHIPPED | OUTPATIENT
Start: 2018-09-10 | End: 2019-02-21

## 2018-09-10 ASSESSMENT — PAIN SCALES - GENERAL: PAINLEVEL: NO PAIN (0)

## 2018-09-10 NOTE — PATIENT INSTRUCTIONS
At [facility], we strive to deliver an exceptional experience to you, every time we see you.  If you receive a survey in the mail, please send us back your thoughts. We really do value your feedback.    Based on your medical history, these are the current health maintenance/preventive care services that you are due for (some may have been done at this visit.)  Health Maintenance Due   Topic Date Due     INFLUENZA VACCINE (1) 09/01/2018         Suggested websites for health information:  Www.[employer]Vu.org : Up to date and easily searchable information on multiple topics.  Www.medlineplus.gov : medication info, interactive tutorials, watch real surgeries online  Www.familydoctor.org : good info from the Academy of Family Physicians  Www.cdc.gov : public health info, travel advisories, epidemics (H1N1)  Www.aap.org : children's health info, normal development, vaccinations  Www.health.[facility].[place].us : [place] dept of health, public health issues in [place], N1N1    Your care team:                            Family Medicine Internal Medicine   MD Trino Lombardo MD Shantel Branch-Fleming, MD Katya Georgiev PA-C Nam Ho, MD Pediatrics   WERO Martinez, CNP MD Chelo Brown CNP, MD Deborah Mielke, MD Kim Thein, APRN CNP      Clinic hours: Monday - Thursday 7 am-7 pm; Fridays 7 am-5 pm.   Urgent care: Monday - Friday 11 am-9 pm; Saturday and Sunday 9 am-5 pm.  Pharmacy : Monday -Thursday 8 am-8 pm; Friday 8 am-6 pm; Saturday and Sunday 9 am-5 pm.     Clinic: [phone]   Pharmacy: [phone]

## 2018-09-10 NOTE — MR AVS SNAPSHOT
After Visit Summary   9/10/2018    James Lama    MRN: [id number]           Patient Information     Date Of Birth          6/28/1927        Visit Information        Provider Department      9/10/2018 4:00 PM Juan Carlos Jansen MD [facility]        Today's Diagnoses     Multiple ecchymoses of both upper arms    -  1    Anemia, unspecified type        Benign hypertension with chronic kidney disease, stage III        Hyperlipidemia LDL goal <130        Hypothyroidism due to acquired atrophy of thyroid        Gastroesophageal reflux disease without esophagitis        Need for prophylactic vaccination and inoculation against influenza          Care Instructions    At [facility], we strive to deliver an exceptional experience to you, every time we see you.  If you receive a survey in the mail, please send us back your thoughts. We really do value your feedback.    Based on your medical history, these are the current health maintenance/preventive care services that you are due for (some may have been done at this visit.)  Health Maintenance Due   Topic Date Due     INFLUENZA VACCINE (1) 09/01/2018         Suggested websites for health information:  Www.H[employer]oo.org : Up to date and easily searchable information on multiple topics.  Www.medlineplus.gov : medication info, interactive tutorials, watch real surgeries online  Www.familydoctor.org : good info from the Academy of Family Physicians  Www.cdc.gov : public health info, travel advisories, epidemics (H1N1)  Www.aap.org : children's health info, normal development, vaccinations  Www.health.state.[place].us : [place] dept of health, public health issues in [place], N1N1    Your care team:                            Family Medicine Internal Medicine   MD Trino Lombardo MD Shantel Branch-Fleming, MD Katya Georgiev PA-C Nam Ho, MD Pediatrics   WERO Martinez, SAGAR ROB CNP  "  MD Chelo Ansari MD Deborah Mielke, MD Kim Thein, APRN CNP      Clinic hours: Monday - Thursday 7 am-7 pm; Fridays 7 am-5 pm.   Urgent care: Monday - Friday 11 am-9 pm; Saturday and Sunday 9 am-5 pm.  Pharmacy : Monday -Thursday 8 am-8 pm; Friday 8 am-6 pm; Saturday and Sunday 9 am-5 pm.     Clinic: [phone]   Pharmacy: [phone]            Follow-ups after your visit        Follow-up notes from your care team     Return in about 2 weeks (around 9/24/2018) for blood pressure check and review labs.      Your next 10 appointments already scheduled     Mar 08, 2019 11:00 AM CST   [facility]    [address] [place] [address] [phone]              Who to contact     If you have questions or need follow up information about today's clinic visit or your schedule please contact [facility] directly at [phone].  Normal or non-critical lab and imaging results will be communicated to you by MyChart, letter or phone within 4 business days after the clinic has received the results. If you do not hear from us within 7 days, please contact the clinic through MyChart or phone. If you have a critical or abnormal lab result, we will notify you by phone as soon as possible.  Submit refill requests through [employer] or call your pharmacy and they will forward the refill request to us. Please allow 3 business days for your refill to be completed.          Additional Information About Your Visit        Care EveryWhere ID     This is your Care EveryWhere ID. This could be used by other organizations to access your [place] medical records  [id number]        Your Vitals Were     Pulse Temperature Respirations Height Pulse Oximetry BMI (Body Mass Index)    64 97.4  F (36.3  C) (Oral) 16 1.746 m (5' 8.75\") 98% 31.24 kg/m2       Blood Pressure from Last 3 Encounters:   09/10/18 " 134/56   08/20/18 120/67   08/07/18 127/67    Weight from Last 3 Encounters:   09/10/18 95.3 kg (210 lb)   08/20/18 93.4 kg (206 lb)   08/07/18 95.3 kg (210 lb)              We Performed the Following     ALT     Blood Morphology Pathologist Review     CBC with platelets differential     CBC with platelets     Erythropoietin     Ferritin     Folate     HC FLU VACCINE, INCREASED ANTIGEN, PRESV FREE     INR     Iron and iron binding capacity     Lipid panel reflex to direct LDL Non-fasting     Methylmalonic acid     Reticulocyte Count     Vitamin B12          Where to get your medicines      These medications were sent to [facility] [place], [place] - [address] [facility]  [address] [facility] [address]     Phone:  [phone]     levothyroxine 88 MCG tablet    lisinopril 40 MG tablet    lovastatin 20 MG tablet    metoprolol succinate 25 MG 24 hr tablet    omeprazole 20 MG CR capsule          Primary Care Provider Office Phone # Fax #    Juan Carlos Jansen [phone]       [address]  [facility] [address]        Equal Access to Services     [facility]: Hadii aad ku hadasho Soomaali, waaxda luqadaha, qaybta kaalmada adeegyada, waxay brett hayaditi mckee . So [facility] [phone].    ATENCIÓN: Si habla español, tiene a londono disposición servicios gratuitos de asistencia lingüística. [facility] [phone].    We comply with applicable federal civil rights laws and [place] laws. We do not discriminate on the basis of race, color, national origin, age, disability, sex, sexual orientation, or gender identity.            Thank you!     Thank you for choosing [facility]  for your care. Our goal is always to provide you with excellent care. Hearing back from our patients is one way we can continue to improve our services. Please take a few minutes to complete the written survey that you may receive in the mail after your visit with us. Thank  you!             Your Updated Medication List - Protect others around you: Learn how to safely use, store and throw away your medicines at www.disposemymeds.org.          This list is accurate as of 9/10/18  5:30 PM.  Always use your most recent med list.                   Brand Name Dispense Instructions for use Diagnosis    aspirin 81 MG tablet      1 tablet daily*        cyanocobalamin 1000 MCG Tabs      1 tablet daily for foot neuropathy (vitamin B12).        fluticasone 50 MCG/ACT spray    FLONASE    1 Bottle    USE ONE TO TWO SPRAY(S) IN EACH NOSTRIL ONCE DAILY FOR  ALLERGY  PREVENTION    Perennial allergic rhinitis       indomethacin 50 MG capsule    INDOCIN    40 capsule    Take 1 capsule (50 mg) by mouth 3 times daily (with meals) as needed for gouty attack.    Gout       levothyroxine 88 MCG tablet    SYNTHROID/LEVOTHROID    90 tablet    Take 1 tablet (88 mcg) by mouth daily for thyroid.    Hypothyroidism due to acquired atrophy of thyroid       lisinopril 40 MG tablet    PRINIVIL/ZESTRIL    90 tablet    Take 1 tablet (40 mg) by mouth daily for blood pressure.    Benign hypertension with chronic kidney disease, stage III       lovastatin 20 MG tablet    MEVACOR    90 tablet    Take 1 tablet (20 mg) by mouth every evening for cholesterol.    Hyperlipidemia LDL goal <130       metoprolol succinate 25 MG 24 hr tablet    TOPROL-XL    90 tablet    Take 1 tablet daily for blood pressure.    Benign hypertension with chronic kidney disease, stage III       omeprazole 20 MG CR capsule    priLOSEC    180 capsule    Take 2 capsules (40 mg) by mouth daily for reflux. Take 30-60 minutes before a meal.    Gastroesophageal reflux disease without esophagitis       polyethylene glycol powder    MIRALAX    510 g    Take 17 g (1 capful) by mouth daily    Constipation, unspecified constipation type       PRESERVISION AREDS PO      Take 1 capsule by mouth 2 times daily (with meals) (PreserVision AREDS 2)        TYLENOL 500 MG  tablet   Generic drug:  acetaminophen      Take 2 tablets by mouth 2 times daily as needed for pain (knee).        VITAMIN D (CHOLECALCIFEROL) PO      Take 1,000 Units by mouth daily

## 2018-09-10 NOTE — NURSING NOTE
James Lama      1.  Has the patient received the information for the influenza vaccine? YES    2.  Does the patient have any of the following contraindications?     Allergy to eggs? No     Allergic reaction to previous influenza vaccines? No     Any other problems to previous influenza vaccines? No     Paralyzed by Guillain-Loudon syndrome? No     Currently pregnant? NO     Current moderate or severe illness? No     Allergy to contact lens solution? No    3.  The vaccine has been administered in the usual fashion and the patient was instructed to wait 20 minutes before leaving the building in the event of an allergic reaction: YES    Vaccination given by Stephanie Richard MA.  Recorded by Stephanie Richard

## 2018-09-10 NOTE — PROGRESS NOTES
SUBJECTIVE:   James Lama is a 89 year old male who presents to clinic today for the following health issues:  He is accompanied by his wife.     Hypertension Follow-up      Outpatient blood pressures are being checked at home.  Results are 130s.    Patient reports he is not taking one of his blood pressure medications because he misunderstood that he was only supposed to stop it temporarily when he was having severe diarrhea. He does not recall which of the two blood pressure meds it was that he stopped.    Low Salt Diet: added salt      Amount of exercise or physical activity: none    Problems taking medications regularly: no    Medication side effects: none    Diet: regular (no restrictions)      Rash  Onset: 9/9/18    Description:   Location: arms  Character: round, raised, red  Itching (Pruritis): no     Progression of Symptoms:  worsening    Accompanying Signs & Symptoms:  Fever: no   Body aches or joint pain: no   Sore throat symptoms: no   Recent cold symptoms: no     History:   Previous similar rash: no     Precipitating factors:   Exposure to similar rash: no   New exposures: none   Recent travel: no   Patient reports he has been doing yard work and carrying branches the last few weeks including yesterday.    Alleviating factors:  none    Therapies Tried and outcome: none    Past medical, family, and social histories, medications, and allergies are reviewed and updated in [facility].     ROS:  CONSTITUTIONAL: NEGATIVE for fever, chills, change in weight  ENT/MOUTH: NEGATIVE for ear, mouth and throat problems  RESP: NEGATIVE for significant cough or SOB  CV: NEGATIVE for chest pain, palpitations or peripheral edema  ROS otherwise negative    This document serves as a record of the services and decisions personally performed and made by Dr. Jansen. It was created on his behalf by Osiris Mendez, a trained medical scribe. The creation of this document is based the provider's statements to the medical  "luis  Osiris Mendez September 10, 2018 4:22 PM     OBJECTIVE:                                                    /56  Pulse 64  Temp 97.4  F (36.3  C) (Oral)  Resp 16  Ht 1.746 m (5' 8.75\")  Wt 95.3 kg (210 lb)  SpO2 98%  BMI 31.24 kg/m2   Body mass index is 31.24 kg/(m^2).     GENERAL: healthy, alert and no distress  EYES: Eyes grossly normal to inspection, PERRL, EOMI, sclerae white and conjunctivae normal  MS: no gross musculoskeletal defects noted, no edema  SKIN: 30x25 mm and 25x10 mm almost rectangular bruises over the left forearm and a 90x55 mm irregular bruise on the right forearm with a parallel linear pattern through it  NEURO: Normal strength and tone, sensory exam grossly normal, mentation intact, oriented times 3 and cranial nerves 2-12 intact  PSYCH: mentation appears normal, affect normal/bright     Diagnostic Test Results:  Results for orders placed or performed in visit on 09/10/18 (from the past 24 hour(s))   CBC with platelets   Result Value Ref Range    WBC 10.5 4.0 - 11.0 10e9/L    RBC Count 3.80 (L) 4.4 - 5.9 10e12/L    Hemoglobin 12.0 (L) 13.3 - 17.7 g/dL    Hematocrit 37.1 (L) 40.0 - 53.0 %    MCV 98 78 - 100 fl    MCH 31.6 26.5 - 33.0 pg    MCHC 32.3 31.5 - 36.5 g/dL    RDW 14.1 10.0 - 15.0 %    Platelet Count 215 150 - 450 10e9/L        ASSESSMENT/PLAN:                                                      (R58) Multiple ecchymoses of both upper arms  (primary encounter diagnosis)  Comment: since his platelet count is normal I believe this is simply from trauma from yard work over the weekend  Plan: CBC with platelets, INR          (D64.9) Anemia, unspecified type  Comment: previous hemoglobin levels were suggestive of anemia of chronic disease, but this is a significant decrease from last month, more than I would expect from brief dietary iron deficiency and bruising. I am going to do the anemia workup again.  Plan: Erythropoietin, Iron and iron binding capacity,        " Ferritin, Methylmalonic acid, Blood Morphology         Pathologist Review, CBC with platelets         differential, Reticulocyte Count, Vitamin B12,         Folate        Return in about 2 weeks (around 9/24/2018) for blood pressure check and review labs.     (I12.9,  N18.3) Benign hypertension with chronic kidney disease, stage III  Comment: borderline controlled. His chlorthalidone 12.5 mg was stopped 2-3 weeks ago  Plan: lisinopril (PRINIVIL/ZESTRIL) 40 MG tablet,         metoprolol succinate (TOPROL-XL) 25 MG 24 hr         tablet        Return in about 2 weeks (around 9/24/2018) for blood pressure check and review labs.     (E78.5) Hyperlipidemia LDL goal <130  Comment: non-fasting. historically at goal   Plan: lovastatin (MEVACOR) 20 MG tablet, ALT, Lipid         panel reflex to direct LDL Non-fasting        Follow up in 6 months.     (E03.4) Hypothyroidism due to acquired atrophy of thyroid  Comment: historically euthyroid   Plan: levothyroxine (SYNTHROID/LEVOTHROID) 88 MCG         tablet        Adjust levothyroxine as needed.     (K21.9) Gastroesophageal reflux disease without esophagitis  Comment: prescription update  Plan: omeprazole (PRILOSEC) 20 MG CR capsule          (Z23) Need for prophylactic vaccination and inoculation against influenza  Comment: Influenza vaccine offered and accepted by patient. He has received it before without problems.   Plan: HC FLU VACCINE, INCREASED ANTIGEN, PRESV FREE                The information in this document, created by the medical scribe for me, accurately reflects the services I personally performed and the decisions made by me. I have reviewed and approved this document for accuracy prior to leaving the patient care area. September 10, 2018 4:22 PM     Juan Carlos Jansen MD

## 2018-09-11 LAB
FERRITIN SERPL-MCNC: 102 NG/ML (ref 26–388)
FOLATE SERPL-MCNC: 15.2 NG/ML
IRON SATN MFR SERPL: 19 % (ref 15–46)
IRON SERPL-MCNC: 55 UG/DL (ref 35–180)
RETICS # AUTO: 90.3 10E9/L (ref 25–95)
RETICS/RBC NFR AUTO: 2.3 % (ref 0.5–2)
TIBC SERPL-MCNC: 293 UG/DL (ref 240–430)
VIT B12 SERPL-MCNC: 656 PG/ML (ref 193–986)

## 2018-09-12 LAB
COPATH REPORT: NORMAL
EPO SERPL-ACNC: 17 MU/ML (ref 4–27)

## 2018-09-13 LAB — METHYLMALONATE SERPL-SCNC: 0.2 UMOL/L (ref 0–0.4)

## 2018-09-25 ENCOUNTER — OFFICE VISIT (OUTPATIENT)
Dept: FAMILY MEDICINE | Facility: CLINIC | Age: 83
End: 2018-09-25
Payer: COMMERCIAL

## 2018-09-25 VITALS
TEMPERATURE: 98.7 F | HEIGHT: 69 IN | HEART RATE: 58 BPM | DIASTOLIC BLOOD PRESSURE: 68 MMHG | SYSTOLIC BLOOD PRESSURE: 136 MMHG | WEIGHT: 209 LBS | BODY MASS INDEX: 30.96 KG/M2 | OXYGEN SATURATION: 99 %

## 2018-09-25 DIAGNOSIS — D64.9 ANEMIA, UNSPECIFIED TYPE: Primary | ICD-10-CM

## 2018-09-25 DIAGNOSIS — E03.4 HYPOTHYROIDISM DUE TO ACQUIRED ATROPHY OF THYROID: ICD-10-CM

## 2018-09-25 LAB
T4 FREE SERPL-MCNC: 1.18 NG/DL (ref 0.76–1.46)
TSH SERPL DL<=0.005 MIU/L-ACNC: 4.32 MU/L (ref 0.4–4)

## 2018-09-25 PROCEDURE — 36415 COLL VENOUS BLD VENIPUNCTURE: CPT | Performed by: FAMILY MEDICINE

## 2018-09-25 PROCEDURE — 84443 ASSAY THYROID STIM HORMONE: CPT | Performed by: FAMILY MEDICINE

## 2018-09-25 PROCEDURE — 84439 ASSAY OF FREE THYROXINE: CPT | Performed by: FAMILY MEDICINE

## 2018-09-25 PROCEDURE — 99214 OFFICE O/P EST MOD 30 MIN: CPT | Performed by: FAMILY MEDICINE

## 2018-09-25 ASSESSMENT — PAIN SCALES - GENERAL: PAINLEVEL: NO PAIN (0)

## 2018-09-25 NOTE — MR AVS SNAPSHOT
After Visit Summary   9/25/2018    James Lama    MRN: [id number]           Patient Information     Date Of Birth          6/28/1927        Visit Information        Provider Department      9/25/2018 10:20 AM Juan Carlos Jansen MD [facility]        Today's Diagnoses     Anemia, unspecified type    -  1    Hypothyroidism due to acquired atrophy of thyroid          Care Instructions    At [facility], we strive to deliver an exceptional experience to you, every time we see you.  If you receive a survey in the mail, please send us back your thoughts. We really do value your feedback.    Your care team:                            Family Medicine Internal Medicine   MD Trino Lombadro MD Shantel Branch-Fleming, MD Katya Georgiev PA-C Megan Hill, APRN CNP    Sergo Rolon MD Pediatrics   Yimi Post, WERO Schwarz, MD Yolie Pagan APRN CNP   MD Chelo Ansari MD Deborah Mielke, MD Kim Thein, APRN [facility]      Clinic hours: Monday - Thursday 7 am-7 pm; Fridays 7 am-5 pm.   Urgent care: Monday - Friday 11 am-9 pm; Saturday and Sunday 9 am-5 pm.  Pharmacy : Monday -Thursday 8 am-8 pm; Friday 8 am-6 pm; Saturday and Sunday 9 am-5 pm.     Clinic: [phone]   Pharmacy: [phone]                Follow-ups after your visit        Additional Services     ONC/HEME ADULT REFERRAL       Your provider has referred you to:   [facility]: Hematology (and all Cancer Related Services) -   [place] [phone]   [email or web address]  [place] [phone]   [email or web address]  [phone]   [email or web address]  [place] [phone]   [email or web address]  [place] [phone]    [email or web address]  [place] [phone]   [email or web address]    Please be aware that coverage of these services is subject to the terms and limitations of your health insurance plan.  Call member services at your health plan with any benefit or coverage questions.      Please bring the following with you to your appointment:    (1) Any X-Rays, CTs or MRIs which have been performed.  Contact the facility where they were done to arrange for  prior to your scheduled appointment.   (2) List of current medications  (3) This referral request   (4) Any documents/labs given to you for this referral                  Your next 10 appointments already scheduled     Mar 08, 2019 11:00 AM CST   [facility] Visit with Catia [facility]o MD   [facility]    [address] [place] [address] [phone]              Who to contact     If you have questions or need follow up information about today's clinic visit or your schedule please contact [facility] directly at [phone].  Normal or non-critical lab and imaging results will be communicated to you by MyChart, letter or phone within 4 business days after the clinic has received the results. If you do not hear from us within 7 days, please contact the clinic through MyChart or phone. If you have a critical or abnormal lab result, we will notify you by phone as soon as possible.  Submit refill requests through [employer] or call your pharmacy and they will forward the refill request to us. Please allow 3 business days for your refill to be completed.          Additional Information About Your Visit        Care EveryWhere ID     This is your Care EveryWhere ID. This could be used by other organizations to access your [facility] medical records  [id number]        Your Vitals Were     Pulse Temperature Height  "Pulse Oximetry BMI (Body Mass Index)       58 98.7  F (37.1  C) (Oral) 5' 8.75\" (1.746 m) 99% 31.09 kg/m2        Blood Pressure from Last 3 Encounters:   09/25/18 136/68   09/10/18 134/56   08/20/18 120/67    Weight from Last 3 Encounters:   09/25/18 209 lb (94.8 kg)   09/10/18 210 lb (95.3 kg)   08/20/18 206 lb (93.4 kg)              We Performed the Following     ONC/HEME ADULT REFERRAL     TSH with free T4 reflex        Primary Care Provider Office Phone # Fax #    Juan Carlos Jansen [phone]       [address]  [facility] [address]        Equal Access to Services     [place] campusKIZZY : Hadii laurent rodriguez hadasho Soshane, waaxda luqadaha, qaybta kaalmada adeegyada, waxay brett hayaditi mckee . So [facility] [phone].    ATENCIÓN: Si habla español, tiene a londono disposición servicios gratuitos de asistencia lingüística. Vini al [phone].    We comply with applicable federal civil rights laws and [place] laws. We do not discriminate on the basis of race, color, national origin, age, disability, sex, sexual orientation, or gender identity.            Thank you!     Thank you for choosing [facility]  for your care. Our goal is always to provide you with excellent care. Hearing back from our patients is one way we can continue to improve our services. Please take a few minutes to complete the written survey that you may receive in the mail after your visit with us. Thank you!             Your Updated Medication List - Protect others around you: Learn how to safely use, store and throw away your medicines at www.disposemymeds.org.          This list is accurate as of 9/25/18 11:31 AM.  Always use your most recent med list.                   Brand Name Dispense Instructions for use Diagnosis    aspirin 81 MG tablet      1 tablet daily*        cyanocobalamin 1000 MCG Tabs      1 tablet daily for foot neuropathy (vitamin B12).        fluticasone 50 MCG/ACT spray    " FLONASE    1 Bottle    USE ONE TO TWO SPRAY(S) IN EACH NOSTRIL ONCE DAILY FOR  ALLERGY  PREVENTION    Perennial allergic rhinitis       indomethacin 50 MG capsule    INDOCIN    40 capsule    Take 1 capsule (50 mg) by mouth 3 times daily (with meals) as needed for gouty attack.    Gout       levothyroxine 88 MCG tablet    SYNTHROID/LEVOTHROID    90 tablet    Take 1 tablet (88 mcg) by mouth daily for thyroid.    Hypothyroidism due to acquired atrophy of thyroid       lisinopril 40 MG tablet    PRINIVIL/ZESTRIL    90 tablet    Take 1 tablet (40 mg) by mouth daily for blood pressure.    Benign hypertension with chronic kidney disease, stage III       lovastatin 20 MG tablet    MEVACOR    90 tablet    Take 1 tablet (20 mg) by mouth every evening for cholesterol.    Hyperlipidemia LDL goal <130       metoprolol succinate 25 MG 24 hr tablet    TOPROL-XL    90 tablet    Take 1 tablet daily for blood pressure.    Benign hypertension with chronic kidney disease, stage III       omeprazole 20 MG CR capsule    priLOSEC    180 capsule    Take 2 capsules (40 mg) by mouth daily for reflux. Take 30-60 minutes before a meal.    Gastroesophageal reflux disease without esophagitis       polyethylene glycol powder    MIRALAX    510 g    Take 17 g (1 capful) by mouth daily    Constipation, unspecified constipation type       PRESERVISION AREDS PO      Take 1 capsule by mouth 2 times daily (with meals) (PreserVision AREDS 2)        TYLENOL 500 MG tablet   Generic drug:  acetaminophen      Take 2 tablets by mouth 2 times daily as needed for pain (knee).        VITAMIN D (CHOLECALCIFEROL) PO      Take 1,000 Units by mouth daily

## 2018-09-25 NOTE — PATIENT INSTRUCTIONS
At [facility], we strive to deliver an exceptional experience to you, every time we see you.  If you receive a survey in the mail, please send us back your thoughts. We really do value your feedback.    Your care team:                            Family Medicine Internal Medicine   MD Trino Lombardo MD Shantel Branch-Fleming, MD Katya Georgiev PA-C Megan Hill, APRN SAGAR Rolon MD Pediatrics   Yimi Post, WERO Schwarz, MD Yolie Pagan APRN CNP   MD Chelo Ansari MD Deborah Mielke, MD Tricia Walker, APRN [facility]      Clinic hours: Monday - Thursday 7 am-7 pm; Fridays 7 am-5 pm.   Urgent care: Monday - Friday 11 am-9 pm; Saturday and Sunday 9 am-5 pm.  Pharmacy : Monday -Thursday 8 am-8 pm; Friday 8 am-6 pm; Saturday and Sunday 9 am-5 pm.     Clinic: [phone]   Pharmacy: [phone]

## 2018-09-25 NOTE — PROGRESS NOTES
"  SUBJECTIVE:   James Lama is a 89 year old male who presents to clinic today for the following health issues:  He is accompanied by his wife.     Hypertension Follow-up      Outpatient blood pressures are being checked at home.  Results are good.    Low Salt Diet: low salt      Amount of exercise or physical activity: None    Problems taking medications regularly: No    Medication side effects: none    Diet: regular (no restrictions) and low salt    Past medical, family, and social histories, medications, and allergies are reviewed and updated in [facility].     ROS:  CONSTITUTIONAL: NEGATIVE for fever, chills, change in weight  ENT/MOUTH: NEGATIVE for ear, mouth and throat problems  RESP: NEGATIVE for significant cough or SOB  CV: NEGATIVE for chest pain, palpitations or peripheral edema  ROS otherwise negative    This document serves as a record of the services and decisions personally performed and made by Dr. Jansen. It was created on his behalf by Osiris Mendez, a trained medical scribe. The creation of this document is based the provider's statements to the medical scribe.  Osiris Mendez September 25, 2018 11:17 AM     OBJECTIVE:                                                    /68  Pulse 58  Temp 98.7  F (37.1  C) (Oral)  Ht 1.746 m (5' 8.75\")  Wt 94.8 kg (209 lb)  SpO2 99%  BMI 31.09 kg/m2   Body mass index is 31.09 kg/(m^2).     GENERAL: healthy, alert and no distress  EYES: Eyes grossly normal to inspection, PERRL, EOMI, sclerae white and conjunctivae normal  RESP: lungs clear to auscultation - no crackles or wheezes, no areas of dullness, no tachypnea  CV: Heart regular rate and rhythm without murmur, click or rub. No peripheral edema and peripheral pulses strong  MS: no gross musculoskeletal defects noted, no edema  SKIN: no suspicious lesions or rashes  NEURO: Normal strength and tone, sensory exam grossly normal, mentation intact, oriented times 3 and cranial nerves 2-12 intact  PSYCH: " mentation appears normal, affect normal/bright     Diagnostic Test Results:  Results for orders placed or performed in visit on 09/10/18   ALT   Result Value Ref Range    ALT 24 0 - 70 U/L   Lipid panel reflex to direct LDL Non-fasting   Result Value Ref Range    Cholesterol 162 <200 mg/dL    Triglycerides 272 (H) <150 mg/dL    HDL Cholesterol 45 >39 mg/dL    LDL Cholesterol Calculated 63 <100 mg/dL    Non HDL Cholesterol 117 <130 mg/dL   CBC with platelets   Result Value Ref Range    WBC 10.5 4.0 - 11.0 10e9/L    RBC Count 3.80 (L) 4.4 - 5.9 10e12/L    Hemoglobin 12.0 (L) 13.3 - 17.7 g/dL    Hematocrit 37.1 (L) 40.0 - 53.0 %    MCV 98 78 - 100 fl    MCH 31.6 26.5 - 33.0 pg    MCHC 32.3 31.5 - 36.5 g/dL    RDW 14.1 10.0 - 15.0 %    Platelet Count 215 150 - 450 10e9/L   INR   Result Value Ref Range    INR 1.00 0.86 - 1.14   Erythropoietin   Result Value Ref Range    Erythropoietin 17 4 - 27 mU/mL   Iron and iron binding capacity   Result Value Ref Range    Iron 55 35 - 180 ug/dL    Iron Binding Cap 293 240 - 430 ug/dL    Iron Saturation Index 19 15 - 46 %   Ferritin   Result Value Ref Range    Ferritin 102 26 - 388 ng/mL   Methylmalonic acid   Result Value Ref Range    Methylmalonic Acid 0.20 0.00 - 0.40 umol/L   Blood Morphology Pathologist Review   Result Value Ref Range    Copath Report       Patient Name: RAJ LYNN  MR#: [id number]  Specimen #: [id number]  Collected: 9/10/2018  Received: 9/11/2018  Reported: 9/12/2018 22:30  Ordering Phy(s): GLORIA CASTILLO    For improved result formatting, select 'View Enhanced Report Format' under   Linked Documents section.    TEST(S):  Peripheral Smear Morphology    FINAL DIAGNOSIS:  Peripheral Smear Morphology:  - Mild normochromic, normocytic anemia without evidence of hemolysis or   increased red cell regeneration.    COMMENT:  Recent lab results show normal iron studies, normal erythropoietin, normal   folate, normal vitamin B12 level  and a normal absolute  reticulocyte count as well as recent normal renal   studies. The blood smear morphology  does not provide any specific features to explain the etiology for the   patient's anemia. Etiologies to exclude  include occult blood loss, chronic disease, hypothyroidism, and marrow   suppression from medications or  infection. The normal platelet count and leukocyte count without e vidence   of dysplasia suggest a primary  disorder of myelopoiesis, such as a myelodysplastic syndrome, is less   likely, although this is a diagnosis of  exclusion and at times is not associated with morphologic evidence of   dysplasia. Further clinical correlation  will likely assist in further narrowing the etiology for the patient's   anemia.    Electronically signed out by:    KIZZY Rodas M.D.    CLINICAL HISTORY:  89-year-old male.    PERIPHERAL BLOOD DATA:  The automated differential is confirmed with a 200 cell count manual   differential review of the smear.    PERIPHERAL BLOOD MORPHOLOGY:    ERYTHROCYTES:  The red cells are normocytic, normochromic and mildly   decreased in number for the patient's age  and gender. No significant anisopoikilocytosis is seen. No features of   hemolysis or increased polychromasia  are identified.  No parasites are identified.    LEUKOCYTES:  The leukocytes are normal in number, morphology and   differential distribution. No immature  precur sors or evidence of neutrophilic dysplasia is seen. No atypical   lymphoid cells are seen. No parasites or  parasitic inclusions are seen.    PLATELETS:  The platelets are normal in number and morphology. (Dictated   by: SUDARSHAN Rodas MD 09/012/2018)    CLINICAL LAB RESULTS:  Battery Order No. Lab Test Code Clinical Result Ref. Range Units Result   Date  Hemogram/Diff/PLT I19013 BK WBC Count 10.2 4.0-11.0 10e9/L 9/10/2018 18:46       RBC Count L 3.81 4.4-5.9 10e12/L 9/10/2018 18:46       Hemoglobin L 12.0 13.3-17.7 g/dL 9/10/2018 18:46       Hematocrit L 37.0  40.0-53.0 % 9/10/2018 18:46       MCV 97  fl 9/10/2018 18:46       MCH 31.5 26.5-33.0 pg 9/10/2018 18:46       MCHC 32.4 31.5-36.5 g/dL 9/10/2018 18:46       RDW 14.1 10.0-15.0 % 9/10/2018 18:46       Platelet Count 223 150-450 10e9/L 9/10/2018 18:46       % Neutrophils 64.6  % 9/10/2018 18:46       % Lymphocytes 24.9  % 9/10/2018 18:46       % Monocytes 7.6  % 9/10/2018 18:46       % Eosinophils 2.7  % 9/10/2018 18:46        % Basophils 0.2  % 9/10/2018 18:46       abs Neutrophils 6.6 1.6-8.3 10e9/L 9/10/2018 18:46       abs Lymphocytes 2.5 0.8-5.3 10e9/L 9/10/2018 18:46       abs Monocytes 0.8 0.0-1.3 10e9/L 9/10/2018 18:46       abs Eosinophils 0.3 0.0-0.7 10e9/L 9/10/2018 18:46       abs Basophils 0.0 0.0-0.2 10e9/L 9/10/2018 18:46        SEE TEXT   9/10/2018 18:46       Text/Comments:  Automated Method    Iron Binding Panel  MG Iron 55  ug/dL 9/11/2018 13:18       Iron Binding Cap 293 240-430 ug/dL 9/11/2018 13:20       Iron Sat Index 19 15-46 % 9/11/2018 13:20    Retic   Retic % H 2.3 0.5-2.0 % 9/11/2018 12:06       Retic abs 90.3 25-95 10e9/L 9/11/2018 12:06    Vitamin B12  BU Vitamin B12 656 193-986 pg/mL 9/11/2018 14:38    Folic Acid, Serum [id number] BU Folic Acid, Serum 15.2 >5.4 ng/mL 9/11/2018   14:57    CPT Codes:  A: 07614-QHMO    TESTING LAB LOCATION:  [address] [address] [phone]    COLLECTI ON SITE:  Client:  [facility] [place]  Location:  [facility] (B)     CBC with platelets differential   Result Value Ref Range    WBC 10.2 4.0 - 11.0 10e9/L    RBC Count 3.81 (L) 4.4 - 5.9 10e12/L    Hemoglobin 12.0 (L) 13.3 - 17.7 g/dL    Hematocrit 37.0 (L) 40.0 - 53.0 %    MCV 97 78 - 100 fl    MCH 31.5 26.5 - 33.0 pg    MCHC 32.4 31.5 - 36.5 g/dL    RDW 14.1 10.0 - 15.0 %    Platelet Count 223 150 - 450 10e9/L    % Neutrophils 64.6 %    % Lymphocytes 24.9 %    % Monocytes 7.6 %    %  Eosinophils 2.7 %    % Basophils 0.2 %    Absolute Neutrophil 6.6 1.6 - 8.3 10e9/L    Absolute Lymphocytes 2.5 0.8 - 5.3 10e9/L    Absolute Monocytes 0.8 0.0 - 1.3 10e9/L    Absolute Eosinophils 0.3 0.0 - 0.7 10e9/L    Absolute Basophils 0.0 0.0 - 0.2 10e9/L    Diff Method Automated Method    Reticulocyte Count   Result Value Ref Range    % Retic 2.3 (H) 0.5 - 2.0 %    Absolute Retic 90.3 25 - 95 10e9/L   Vitamin B12   Result Value Ref Range    Vitamin B12 656 193 - 986 pg/mL   Folate   Result Value Ref Range    Folate 15.2 >5.4 ng/mL        ASSESSMENT/PLAN:                                                      (D64.9) Anemia, unspecified type  (primary encounter diagnosis)  Comment: DDX includes anemia of chronic disease, marrow suppression, but occult bleeding does not seem likely. Historically euthyroid   Plan: ONC/HEME ADULT REFERRAL          (E03.4) Hypothyroidism due to acquired atrophy of thyroid  Comment: historically euthyroid. Lab update  Plan: TSH with free T4 reflex        Adjust levothyroxine as needed.      The information in this document, created by the medical scribe for me, accurately reflects the services I personally performed and the decisions made by me. I have reviewed and approved this document for accuracy prior to leaving the patient care area. September 25, 2018 11:17 AM     Juan Carlos Jansen MD

## 2018-09-25 NOTE — LETTER
Results for orders placed or performed in visit on 09/10/18   ALT   Result Value Ref Range    ALT 24 0 - 70 U/L   Lipid panel reflex to direct LDL Non-fasting   Result Value Ref Range    Cholesterol 162 <200 mg/dL    Triglycerides 272 (H) <150 mg/dL    HDL Cholesterol 45 >39 mg/dL    LDL Cholesterol Calculated 63 <100 mg/dL    Non HDL Cholesterol 117 <130 mg/dL   CBC with platelets   Result Value Ref Range    WBC 10.5 4.0 - 11.0 10e9/L    RBC Count 3.80 (L) 4.4 - 5.9 10e12/L    Hemoglobin 12.0 (L) 13.3 - 17.7 g/dL    Hematocrit 37.1 (L) 40.0 - 53.0 %    MCV 98 78 - 100 fl    MCH 31.6 26.5 - 33.0 pg    MCHC 32.3 31.5 - 36.5 g/dL    RDW 14.1 10.0 - 15.0 %    Platelet Count 215 150 - 450 10e9/L   INR   Result Value Ref Range    INR 1.00 0.86 - 1.14   Erythropoietin   Result Value Ref Range    Erythropoietin 17 4 - 27 mU/mL   Iron and iron binding capacity   Result Value Ref Range    Iron 55 35 - 180 ug/dL    Iron Binding Cap 293 240 - 430 ug/dL    Iron Saturation Index 19 15 - 46 %   Ferritin   Result Value Ref Range    Ferritin 102 26 - 388 ng/mL   Methylmalonic acid   Result Value Ref Range    Methylmalonic Acid 0.20 0.00 - 0.40 umol/L   Blood Morphology Pathologist Review   Result Value Ref Range    Copath Report       Patient Name: RAJ LYNN  MR#: [id number]  Specimen #: [id number]  Collected: 9/10/2018  Received: 9/11/2018  Reported: 9/12/2018 22:30  Ordering Phy(s): GLORIA CASTILLO    For improved result formatting, select 'View Enhanced Report Format' under   Linked Documents section.    TEST(S):  Peripheral Smear Morphology    FINAL DIAGNOSIS:  Peripheral Smear Morphology:  - Mild normochromic, normocytic anemia without evidence of hemolysis or   increased red cell regeneration.    COMMENT:  Recent lab results show normal iron studies, normal erythropoietin, normal   folate, normal vitamin B12 level  and a normal absolute reticulocyte count as well as recent normal renal   studies. The blood smear  morphology  does not provide any specific features to explain the etiology for the   patient's anemia. Etiologies to exclude  include occult blood loss, chronic disease, hypothyroidism, and marrow   suppression from medications or  infection. The normal platelet count and leukocyte count without e vidence   of dysplasia suggest a primary  disorder of myelopoiesis, such as a myelodysplastic syndrome, is less   likely, although this is a diagnosis of  exclusion and at times is not associated with morphologic evidence of   dysplasia. Further clinical correlation  will likely assist in further narrowing the etiology for the patient's   anemia.    Electronically signed out by:    KIZZY Rodas M.D.    CLINICAL HISTORY:  89-year-old male.    PERIPHERAL BLOOD DATA:  The automated differential is confirmed with a 200 cell count manual   differential review of the smear.    PERIPHERAL BLOOD MORPHOLOGY:    ERYTHROCYTES:  The red cells are normocytic, normochromic and mildly   decreased in number for the patient's age  and gender. No significant anisopoikilocytosis is seen. No features of   hemolysis or increased polychromasia  are identified.  No parasites are identified.    LEUKOCYTES:  The leukocytes are normal in number, morphology and   differential distribution. No immature  precur sors or evidence of neutrophilic dysplasia is seen. No atypical   lymphoid cells are seen. No parasites or  parasitic inclusions are seen.    PLATELETS:  The platelets are normal in number and morphology. (Dictated   by: SUDARSHAN Rodas MD 09/012/2018)    CLINICAL LAB RESULTS:  Battery Order No. Lab Test Code Clinical Result Ref. Range Units Result   Date  Hemogram/Diff/PLT [id number] BK WBC Count 10.2 4.0-11.0 10e9/L 9/10/2018 18:46       RBC Count L 3.81 4.4-5.9 10e12/L 9/10/2018 18:46       Hemoglobin L 12.0 13.3-17.7 g/dL 9/10/2018 18:46       Hematocrit L 37.0 40.0-53.0 % 9/10/2018 18:46       MCV 97  fl 9/10/2018 18:46       MCH  31.5 26.5-33.0 pg 9/10/2018 18:46       MCHC 32.4 31.5-36.5 g/dL 9/10/2018 18:46       RDW 14.1 10.0-15.0 % 9/10/2018 18:46       Platelet Count 223 150-450 10e9/L 9/10/2018 18:46       % Neutrophils 64.6  % 9/10/2018 18:46       % Lymphocytes 24.9  % 9/10/2018 18:46       % Monocytes 7.6  % 9/10/2018 18:46       % Eosinophils 2.7  % 9/10/2018 18:46        % Basophils 0.2  % 9/10/2018 18:46       abs Neutrophils 6.6 1.6-8.3 10e9/L 9/10/2018 18:46       abs Lymphocytes 2.5 0.8-5.3 10e9/L 9/10/2018 18:46       abs Monocytes 0.8 0.0-1.3 10e9/L 9/10/2018 18:46       abs Eosinophils 0.3 0.0-0.7 10e9/L 9/10/2018 18:46       abs Basophils 0.0 0.0-0.2 10e9/L 9/10/2018 18:46        SEE TEXT   9/10/2018 18:46       Text/Comments:  Automated Method    Iron Binding Panel  MG Iron 55  ug/dL 9/11/2018 13:18       Iron Binding Cap 293 240-430 ug/dL 9/11/2018 13:20       Iron Sat Index 19 15-46 % 9/11/2018 13:20    Retic   Retic % H 2.3 0.5-2.0 % 9/11/2018 12:06       Retic abs 90.3 25-95 10e9/L 9/11/2018 12:06    Vitamin B12  BU Vitamin B12 656 193-986 pg/mL 9/11/2018 14:38    Folic Acid, Serum [id number] BU Folic Acid, Serum 15.2 >5.4 ng/mL 9/11/2018   14:57    CPT Codes:  A: 80748-ULXX    TESTING LAB LOCATION:  [address] [address] [phone]    COLLECTI ON SITE:  Client:  [facility]  Location:  [facility] (B)     CBC with platelets differential   Result Value Ref Range    WBC 10.2 4.0 - 11.0 10e9/L    RBC Count 3.81 (L) 4.4 - 5.9 10e12/L    Hemoglobin 12.0 (L) 13.3 - 17.7 g/dL    Hematocrit 37.0 (L) 40.0 - 53.0 %    MCV 97 78 - 100 fl    MCH 31.5 26.5 - 33.0 pg    MCHC 32.4 31.5 - 36.5 g/dL    RDW 14.1 10.0 - 15.0 %    Platelet Count 223 150 - 450 10e9/L    % Neutrophils 64.6 %    % Lymphocytes 24.9 %    % Monocytes 7.6 %    % Eosinophils 2.7 %    % Basophils 0.2 %    Absolute Neutrophil 6.6 1.6 - 8.3  10e9/L    Absolute Lymphocytes 2.5 0.8 - 5.3 10e9/L    Absolute Monocytes 0.8 0.0 - 1.3 10e9/L    Absolute Eosinophils 0.3 0.0 - 0.7 10e9/L    Absolute Basophils 0.0 0.0 - 0.2 10e9/L    Diff Method Automated Method    Reticulocyte Count   Result Value Ref Range    % Retic 2.3 (H) 0.5 - 2.0 %    Absolute Retic 90.3 25 - 95 10e9/L   Vitamin B12   Result Value Ref Range    Vitamin B12 656 193 - 986 pg/mL   Folate   Result Value Ref Range    Folate 15.2 >5.4 ng/mL

## 2018-10-08 NOTE — TELEPHONE ENCOUNTER
Date of appointment:  10/22/2018              Diagnosis/reason for appointment: Anemia unspecified  Referring provider/facility: Dr. Juan Carlos Jansen  Who called:    Recent Studies  Imaging:  Pathology:  Labs:  Previous chemo/radiation (if known):    Records requested from:  Records received from:    Additional information:

## 2018-10-22 ENCOUNTER — ONCOLOGY VISIT (OUTPATIENT)
Dept: ONCOLOGY | Facility: CLINIC | Age: 83
End: 2018-10-22
Payer: COMMERCIAL

## 2018-10-22 ENCOUNTER — PRE VISIT (OUTPATIENT)
Dept: ONCOLOGY | Facility: CLINIC | Age: 83
End: 2018-10-22

## 2018-10-22 VITALS
TEMPERATURE: 97.5 F | OXYGEN SATURATION: 99 % | HEART RATE: 68 BPM | HEIGHT: 69 IN | WEIGHT: 213 LBS | DIASTOLIC BLOOD PRESSURE: 79 MMHG | RESPIRATION RATE: 18 BRPM | BODY MASS INDEX: 31.55 KG/M2 | SYSTOLIC BLOOD PRESSURE: 144 MMHG

## 2018-10-22 DIAGNOSIS — D64.9 NORMOCYTIC ANEMIA: Primary | ICD-10-CM

## 2018-10-22 LAB
BASOPHILS # BLD AUTO: 0 10E9/L (ref 0–0.2)
BASOPHILS NFR BLD AUTO: 0.3 %
DIFFERENTIAL METHOD BLD: ABNORMAL
EOSINOPHIL # BLD AUTO: 0.2 10E9/L (ref 0–0.7)
EOSINOPHIL NFR BLD AUTO: 1.8 %
ERYTHROCYTE [DISTWIDTH] IN BLOOD BY AUTOMATED COUNT: 13.3 % (ref 10–15)
HCT VFR BLD AUTO: 41.2 % (ref 40–53)
HGB BLD-MCNC: 13.4 G/DL (ref 13.3–17.7)
IMM GRANULOCYTES # BLD: 0 10E9/L (ref 0–0.4)
IMM GRANULOCYTES NFR BLD: 0.4 %
LYMPHOCYTES # BLD AUTO: 2.5 10E9/L (ref 0.8–5.3)
LYMPHOCYTES NFR BLD AUTO: 24.8 %
MCH RBC QN AUTO: 31.2 PG (ref 26.5–33)
MCHC RBC AUTO-ENTMCNC: 32.5 G/DL (ref 31.5–36.5)
MCV RBC AUTO: 96 FL (ref 78–100)
MONOCYTES # BLD AUTO: 0.7 10E9/L (ref 0–1.3)
MONOCYTES NFR BLD AUTO: 7.3 %
NEUTROPHILS # BLD AUTO: 6.5 10E9/L (ref 1.6–8.3)
NEUTROPHILS NFR BLD AUTO: 65.4 %
PLATELET # BLD AUTO: 193 10E9/L (ref 150–450)
RBC # BLD AUTO: 4.3 10E12/L (ref 4.4–5.9)
WBC # BLD AUTO: 9.9 10E9/L (ref 4–11)

## 2018-10-22 PROCEDURE — 36415 COLL VENOUS BLD VENIPUNCTURE: CPT | Performed by: INTERNAL MEDICINE

## 2018-10-22 PROCEDURE — 99204 OFFICE O/P NEW MOD 45 MIN: CPT | Performed by: INTERNAL MEDICINE

## 2018-10-22 PROCEDURE — 85025 COMPLETE CBC W/AUTO DIFF WBC: CPT | Performed by: INTERNAL MEDICINE

## 2018-10-22 ASSESSMENT — PAIN SCALES - GENERAL: PAINLEVEL: NO PAIN (0)

## 2018-10-22 NOTE — Clinical Note
10/22/2018         RE: James Lama  [address]  [place] [address]        Dear Colleague,    Thank you for referring your patient, James Lama, to the [facility]. Please see a copy of my visit note below.    DATE OF VISIT: Oct 22, 2018    REASON FOR REFERRAL:   Mild normocytic anemia      HISTORY OF PRESENT ILLNESS:     89-year-old male with past medical history significant for hypertension, hypothyroidism, prostate cancer status post radiation who was referred to hematology clinic today for further evaluation and recommendations on mild normocytic anemia.    Patient had recent laboratory done by primary care provider in office last month which showed mild normocytic anemia with hemoglobin at 12. Further workup was ordered including iron studies, B12/folic acid, reticulocyte count, erythropoietin, TSH was free T4 and smear morphology which all came back essentially unremarkable for any obvious etiology.    Presents to the hematology clinic today accompanied by his wife. Complains of mild generalized fatigue. Denies in the stool/black stools, hematuria, dyspnea on exertion, abdominal pain, dizziness/lightheadedness, or weight loss, bone pain or any other complaints.. Stable appetite . Independent in activities of daily living    REVIEW OF SYSTEMS:      ROS: 14 point ROS neg other than the symptoms noted above in the HPI.    PAST MEDICAL HISTORY:   Past Medical History:   Diagnosis Date     Actinic keratosis      Arthritis      Basal cell carcinoma Pre-2009    nose, forehead, back     BPH      Diverticulosis 1/02     Erectile dysfunction      Essential hypertension, benign      Gout      Hyperlipidemia LDL goal <130      Hypothyroidism      KESHA (obstructive sleep apnoea)      Perennial allergic rhinitis     dust, mold     Prostate cancer (H) 1/04    s/p XRT 2005, Dr. Mcmanus     Recurrent BCC (basal cell carcinoma)        PAST SURGICAL HISTORY:   Past Surgical History:    Procedure Laterality Date     EXC SKIN MALIG 0.5CM OR LESS,FACIAL  5/09    BCC nasal tip     EXC SKIN MALIG 0.6-1CM FACE,FACIAL  5/09    BCC left ala     EXC SKIN MALIG 1.1-2CM FACE,FACIAL  10/09    BCC forehead     EXC SKIN MALIG 1.1-2CM TRUNK,ARM,LEG  8/06    BCC upper back     HERNIA REPAIR, INGUINAL RT/LT  ~1988    LT       ALLERGIES:   Allergies as of 10/22/2018 - Omar as Reviewed 10/22/2018   Allergen Reaction Noted     Acrylate copolymer Rash 10/17/2016       MEDICATIONS:   Current Outpatient Prescriptions   Medication Sig Dispense Refill     acetaminophen (TYLENOL) 500 MG tablet Take 2 tablets by mouth 2 times daily as needed for pain (knee).  0     ASPIRIN 81 MG OR TABS 1 tablet daily*       Cyanocobalamin 1000 MCG TABS 1 tablet daily for foot neuropathy (vitamin B12).       fluticasone (FLONASE) 50 MCG/ACT spray USE ONE TO TWO SPRAY(S) IN EACH NOSTRIL ONCE DAILY FOR  ALLERGY  PREVENTION 1 Bottle 1     indomethacin (INDOCIN) 50 MG capsule Take 1 capsule (50 mg) by mouth 3 times daily (with meals) as needed for gouty attack. 40 capsule 1     levothyroxine (SYNTHROID/LEVOTHROID) 88 MCG tablet Take 1 tablet (88 mcg) by mouth daily for thyroid. 90 tablet 1     lisinopril (PRINIVIL/ZESTRIL) 40 MG tablet Take 1 tablet (40 mg) by mouth daily for blood pressure. 90 tablet 1     lovastatin (MEVACOR) 20 MG tablet Take 1 tablet (20 mg) by mouth every evening for cholesterol. 90 tablet 1     metoprolol succinate (TOPROL-XL) 25 MG 24 hr tablet Take 1 tablet daily for blood pressure. 90 tablet 1     Multiple Vitamins-Minerals (PRESERVISION AREDS PO) Take 1 capsule by mouth 2 times daily (with meals) (PreserVision AREDS 2)       omeprazole (PRILOSEC) 20 MG CR capsule Take 2 capsules (40 mg) by mouth daily for reflux. Take 30-60 minutes before a meal. 180 capsule 3     VITAMIN D, CHOLECALCIFEROL, PO Take 1,000 Units by mouth daily        polyethylene glycol (MIRALAX) powder Take 17 g (1 capful) by mouth daily (Patient  "not taking: Reported on 10/22/2018) 510 g 1        FAMILY HISTORY:   Family History   Problem Relation Age of Onset     Cancer Father      , unk type     Myocardial Infarction Sister      Thyroid Disease Sister      Diabetes No family hx of      Hypertension No family hx of      Cerebrovascular Disease No family hx of      Glaucoma No family hx of      Macular Degeneration No family hx of        SOCIAL HISTORY:   Social History     Social History     Marital status:      Spouse name: Lesia     Number of children: 6     Years of education: 16.5     Occupational History      Retired          Social History Main Topics     Smoking status: Never Smoker     Smokeless tobacco: Never Used     Alcohol use No     Drug use: No     Sexual activity: Yes     Partners: Female     Birth control/ protection: None     Other Topics Concern     None     Social History Narrative    Wife has lymphoma       PHYSICAL EXAMINATION:   /79  Pulse 68  Temp 97.5  F (36.4  C)  Resp 18  Ht 1.746 m (5' 8.75\")  Wt 96.6 kg (213 lb)  SpO2 99%  BMI 31.68 kg/m2  Wt Readings from Last 10 Encounters:   10/22/18 96.6 kg (213 lb)   18 94.8 kg (209 lb)   09/10/18 95.3 kg (210 lb)   18 93.4 kg (206 lb)   18 95.3 kg (210 lb)   18 95.8 kg (211 lb 3.2 oz)   18 98.7 kg (217 lb 9.6 oz)   18 98.5 kg (217 lb 3.2 oz)   18 97.1 kg (214 lb)   10/03/17 96.6 kg (213 lb)        Exam:  Constitutional: healthy, alert and no distress  Head: Normocephalic. No masses,  Neck: Neck supple. No adenopathy.  ENT: ENT exam normal, no neck nodes or sinus tenderness  Cardiovascular:  RRR.   Respiratory: Lungs clear  Gastrointestinal: Abdomen soft, non-tender. BS normal.  Musculoskeletal: extremities normal  Skin: no suspicious lesions or rashes  Neurologic: Gait normal. Sensation grossly WNL.  Psychiatric: mentation appears normal and affect normal/bright  Hematologic/Lymphatic/Immunologic: " Normal cervical lymph nodes        LABORATORY RESULTS:    Recent Labs   Lab Test  08/24/18   1034  08/20/18   1624   NA  134  130*   POTASSIUM  4.4  4.6   CHLORIDE  99  95   BUN  17  22   CR  1.13  1.21   GLC  65*  102*   DORIS  9.2  9.0     Recent Labs   Lab Test  09/10/18   1744  09/10/18   1640  08/20/18   1624   12/07/15   1116   WBC  10.2  10.5  11.4*  Canceled, Test credited   < >  7.7   HGB  12.0*  12.0*  13.1*  13.1*   < >  12.8*   PLT  223  215  167  167   < >  165   MCV  97  98  92  92   < >  95   NEUTROPHIL  64.6   --   68.4   --   57.9    < > = values in this interval not displayed.     Recent Labs   Lab Test  09/10/18   1640  03/08/18   1203  09/01/17   1140   10/14/15   1419   BILITOTAL   --    --    --    --   0.4   ALKPHOS   --    --    --    --   92   ALT  24  14  19   < >  18   AST   --    --    --    --   19   ALBUMIN   --    --    --    --   3.9    < > = values in this interval not displayed.     Component      Latest Ref Rng & Units 9/10/2018 9/25/2018   Iron      35 - 180 ug/dL 55    Iron Binding Cap      240 - 430 ug/dL 293    Iron Saturation Index      15 - 46 % 19    % Retic      0.5 - 2.0 % 2.3 (H)    Absolute Retic      25 - 95 10e9/L 90.3    Erythropoietin      4 - 27 mU/mL 17    Ferritin      26 - 388 ng/mL 102    Methylmalonic Acid      0.00 - 0.40 umol/L 0.20    Vitamin B12      193 - 986 pg/mL 656    Folate      >5.4 ng/mL 15.2    TSH      0.40 - 4.00 mU/L  4.32 (H)   T4 Free      0.76 - 1.46 ng/dL  1.18       TEST(S):   Peripheral Smear Morphology     FINAL DIAGNOSIS:   Peripheral Smear Morphology:   - Mild normochromic, normocytic anemia without evidence of hemolysis or increased red cell regeneration.     COMMENT:   Recent lab results show normal iron studies, normal erythropoietin, normal folate, normal vitamin B12 level   and a normal absolute reticulocyte count as well as recent normal renal studies. The blood smear morphology does not provide any specific features to explain  the etiology for the patient's anemia. Etiologies to exclude include occult blood loss, chronic disease, hypothyroidism, and marrow suppression from medications or infection. The normal platelet count and leukocyte count without evidence of dysplasia suggest a primary disorder of myelopoiesis, such as a myelodysplastic syndrome, is less likely, although this is a diagnosis of exclusion and at times is not associated with morphologic evidence of dysplasia. Further clinical correlation will likely assist in further narrowing the etiology for the patient's anemia.       ASSESSMENT AND PLAN:      89-year-old male with past medical history significant for hypertension, hypothyroidism, prostate cancer status post radiation who was referred to hematology clinic today for further evaluation and recommendations on mild normocytic anemia.     Normocytic anemia    Workup done so far includes Iron studies, B12/folic acid, reticulocyte count, creatinine, erythropoietin, TSH / free T4 and smear morphology- essentially unremarkable for any obvious etiology.  Clinically doing okay overall with no active complaints/ symptoms from underlying anemia.  No concerns for an ongoing bleed on history.  One possibility could be underlying bone marrow dysfunction/MDS. Patient was informed that only way to rule that out would be to do a bone marrow aspiration and biopsy. However given his advanced age along with mild anemia, discussed with him the utility of an invasive procedure and the fact that it is likely not going to make any difference with regards to management plan  which would be continued observation at this point with consideration of supportive care/transfusion if further labs indicated worsening anemia causing symptoms.    He understands the above discussion and is agreeable not to consider bone marrow biopsy at this point. We will repeat a CBC today and if counts stable have him discharged from our clinic and continue follow-up  with her primary care provider with serial CBC monitoring    The patient is ready to learn, no apparent learning barriers were identified, Diagnosis and treatment plans were explained to the patient. The patient expressed understanding of the content. The patient questions were answered to his satisfaction.    Chart documentation with Dragon Voice recognition Software. Although reviewed after completion, some words and grammatical errors may remain.    Miguelito Cortes MD  Attending Physician   Hematology/Medical Oncology        Again, thank you for allowing me to participate in the care of your patient.        Sincerely,        Miguelito Cortes MD

## 2018-10-22 NOTE — MR AVS SNAPSHOT
"              After Visit Summary   10/22/2018    James Lama    MRN: [id number]           Patient Information     Date Of Birth          6/28/1927        Visit Information        Provider Department      10/22/2018 12:45 PM Miguelito Cortes MD [facility]        Today's Diagnoses     Normocytic anemia    -  1       Follow-ups after your visit        Your next 10 appointments already scheduled     Mar 08, 2019 11:00 AM CST   Return Visit with Catia Loredo MD   [facility]    [address] [place] [address] [phone]              Who to contact     If you have questions or need follow up information about today's clinic visit or your schedule please contact [facility] directly at [phone].  Normal or non-critical lab and imaging results will be communicated to you by MyChart, letter or phone within 4 business days after the clinic has received the results. If you do not hear from us within 7 days, please contact the clinic through MyChart or phone. If you have a critical or abnormal lab result, we will notify you by phone as soon as possible.  Submit refill requests through [employer] or call your pharmacy and they will forward the refill request to us. Please allow 3 business days for your refill to be completed.          Additional Information About Your Visit        Care EveryWhere ID     This is your Care EveryWhere ID. This could be used by other organizations to access your [facility] medical records  [id number]        Your Vitals Were     Pulse Temperature Respirations Height Pulse Oximetry BMI (Body Mass Index)    68 97.5  F (36.4  C) 18 1.746 m (5' 8.75\") 99% 31.68 kg/m2       Blood Pressure from Last 3 Encounters:   No data found for BP    Weight from Last 3 Encounters:   No data found for Wt              Today, you had the following     No orders found for display       Primary Care Provider Office " Phone # Fax #    Juan Carlos Jansen [phone] [address]  [facility] [address]        Equal Access to Services     PROSPER RAZO : Hadjaci laurent rodriguez carmeno Soshane, waaxda luqadaha, qaybta kaalmada adereena, danae vizcaino rafi jones. So [facility] [phone].    ATENCIÓN: Si habla español, tiene a londono disposición servicios gratuitos de asistencia lingüística. Llame al [phone].    We comply with applicable federal civil rights laws and [place] laws. We do not discriminate on the basis of race, color, national origin, age, disability, sex, sexual orientation, or gender identity.            Thank you!     Thank you for choosing [facility]  for your care. Our goal is always to provide you with excellent care. Hearing back from our patients is one way we can continue to improve our services. Please take a few minutes to complete the written survey that you may receive in the mail after your visit with us. Thank you!             Your Updated Medication List - Protect others around you: Learn how to safely use, store and throw away your medicines at www.disposemymeds.org.          This list is accurate as of 10/22/18 11:59 PM.  Always use your most recent med list.                   Brand Name Dispense Instructions for use Diagnosis    aspirin 81 MG tablet      1 tablet daily*        cyanocobalamin 1000 MCG Tabs      1 tablet daily for foot neuropathy (vitamin B12).        fluticasone 50 MCG/ACT spray    FLONASE    1 Bottle    USE ONE TO TWO SPRAY(S) IN EACH NOSTRIL ONCE DAILY FOR  ALLERGY  PREVENTION    Perennial allergic rhinitis       indomethacin 50 MG capsule    INDOCIN    40 capsule    Take 1 capsule (50 mg) by mouth 3 times daily (with meals) as needed for gouty attack.    Gout       levothyroxine 88 MCG tablet    SYNTHROID/LEVOTHROID    90 tablet    Take 1 tablet (88 mcg) by mouth daily for thyroid.    Hypothyroidism due to acquired atrophy of  thyroid       lisinopril 40 MG tablet    PRINIVIL/ZESTRIL    90 tablet    Take 1 tablet (40 mg) by mouth daily for blood pressure.    Benign hypertension with chronic kidney disease, stage III (H)       lovastatin 20 MG tablet    MEVACOR    90 tablet    Take 1 tablet (20 mg) by mouth every evening for cholesterol.    Hyperlipidemia LDL goal <130       metoprolol succinate 25 MG 24 hr tablet    TOPROL-XL    90 tablet    Take 1 tablet daily for blood pressure.    Benign hypertension with chronic kidney disease, stage III (H)       omeprazole 20 MG CR capsule    priLOSEC    180 capsule    Take 2 capsules (40 mg) by mouth daily for reflux. Take 30-60 minutes before a meal.    Gastroesophageal reflux disease without esophagitis       polyethylene glycol powder    MIRALAX    510 g    Take 17 g (1 capful) by mouth daily    Constipation, unspecified constipation type       PRESERVISION AREDS PO      Take 1 capsule by mouth 2 times daily (with meals) (PreserVision AREDS 2)        TYLENOL 500 MG tablet   Generic drug:  acetaminophen      Take 2 tablets by mouth 2 times daily as needed for pain (knee).        VITAMIN D (CHOLECALCIFEROL) PO      Take 1,000 Units by mouth daily

## 2018-10-22 NOTE — NURSING NOTE
"Oncology Rooming Note    October 22, 2018 12:56 PM   James Lama is a 89 year old male who presents for:    Chief Complaint   Patient presents with     Oncology Clinic Visit     New patient      Initial Vitals: /79  Pulse 68  Temp 97.5  F (36.4  C)  Resp 18  Ht 1.746 m (5' 8.75\")  Wt 96.6 kg (213 lb)  SpO2 99%  BMI 31.68 kg/m2 Estimated body mass index is 31.68 kg/(m^2) as calculated from the following:    Height as of this encounter: 1.746 m (5' 8.75\").    Weight as of this encounter: 96.6 kg (213 lb). Body surface area is 2.16 meters squared.  No Pain (0) Comment: Data Unavailable   No LMP for male patient.  Allergies reviewed: Yes  Medications reviewed: Yes    Medications: Medication refills not needed today.  Pharmacy name entered into [employer]:    [facility] - [place], [place] - [address] [facility] - [facility] [address]         5 minutes for nursing intake (face to face time)     Abbie Doan LPN              "

## 2018-10-22 NOTE — PROGRESS NOTES
DATE OF VISIT: Oct 22, 2018    REASON FOR REFERRAL:   Mild normocytic anemia      HISTORY OF PRESENT ILLNESS:     89-year-old male with past medical history significant for hypertension, hypothyroidism, prostate cancer status post radiation who was referred to hematology clinic today for further evaluation and recommendations on mild normocytic anemia.    Patient had recent laboratory done by primary care provider in office last month which showed mild normocytic anemia with hemoglobin at 12. Further workup was ordered including iron studies, B12/folic acid, reticulocyte count, erythropoietin, TSH was free T4 and smear morphology which all came back essentially unremarkable for any obvious etiology.    Presents to the hematology clinic today accompanied by his wife. Complains of mild generalized fatigue. Denies in the stool/black stools, hematuria, dyspnea on exertion, abdominal pain, dizziness/lightheadedness, or weight loss, bone pain or any other complaints.. Stable appetite . Independent in activities of daily living    REVIEW OF SYSTEMS:      ROS: 14 point ROS neg other than the symptoms noted above in the HPI.    PAST MEDICAL HISTORY:   Past Medical History:   Diagnosis Date     Actinic keratosis      Arthritis      Basal cell carcinoma Pre-2009    nose, forehead, back     BPH      Diverticulosis 1/02     Erectile dysfunction      Essential hypertension, benign      Gout      Hyperlipidemia LDL goal <130      Hypothyroidism      KESHA (obstructive sleep apnoea)      Perennial allergic rhinitis     dust, mold     Prostate cancer (H) 1/04    s/p XRT 2005, Dr. Mcmanus     Recurrent BCC (basal cell carcinoma)        PAST SURGICAL HISTORY:   Past Surgical History:   Procedure Laterality Date     EXC SKIN MALIG 0.5CM OR LESS,FACIAL  5/09    BCC nasal tip     EXC SKIN MALIG 0.6-1CM FACE,FACIAL  5/09    BCC left ala     EXC SKIN MALIG 1.1-2CM FACE,FACIAL  10/09    BCC forehead     EXC SKIN MALIG 1.1-2CM TRUNK,ARM,LEG  8/06     BCC upper back     HERNIA REPAIR, INGUINAL RT/LT  ~    LT       ALLERGIES:   Allergies as of 10/22/2018 - Omar as Reviewed 10/22/2018   Allergen Reaction Noted     Acrylate copolymer Rash 10/17/2016       MEDICATIONS:   Current Outpatient Prescriptions   Medication Sig Dispense Refill     acetaminophen (TYLENOL) 500 MG tablet Take 2 tablets by mouth 2 times daily as needed for pain (knee).  0     ASPIRIN 81 MG OR TABS 1 tablet daily*       Cyanocobalamin 1000 MCG TABS 1 tablet daily for foot neuropathy (vitamin B12).       fluticasone (FLONASE) 50 MCG/ACT spray USE ONE TO TWO SPRAY(S) IN EACH NOSTRIL ONCE DAILY FOR  ALLERGY  PREVENTION 1 Bottle 1     indomethacin (INDOCIN) 50 MG capsule Take 1 capsule (50 mg) by mouth 3 times daily (with meals) as needed for gouty attack. 40 capsule 1     levothyroxine (SYNTHROID/LEVOTHROID) 88 MCG tablet Take 1 tablet (88 mcg) by mouth daily for thyroid. 90 tablet 1     lisinopril (PRINIVIL/ZESTRIL) 40 MG tablet Take 1 tablet (40 mg) by mouth daily for blood pressure. 90 tablet 1     lovastatin (MEVACOR) 20 MG tablet Take 1 tablet (20 mg) by mouth every evening for cholesterol. 90 tablet 1     metoprolol succinate (TOPROL-XL) 25 MG 24 hr tablet Take 1 tablet daily for blood pressure. 90 tablet 1     Multiple Vitamins-Minerals (PRESERVISION AREDS PO) Take 1 capsule by mouth 2 times daily (with meals) (PreserVision AREDS 2)       omeprazole (PRILOSEC) 20 MG CR capsule Take 2 capsules (40 mg) by mouth daily for reflux. Take 30-60 minutes before a meal. 180 capsule 3     VITAMIN D, CHOLECALCIFEROL, PO Take 1,000 Units by mouth daily        polyethylene glycol (MIRALAX) powder Take 17 g (1 capful) by mouth daily (Patient not taking: Reported on 10/22/2018) 510 g 1        FAMILY HISTORY:   Family History   Problem Relation Age of Onset     Cancer Father      , unk type     Myocardial Infarction Sister      Thyroid Disease Sister      Diabetes No family hx of      Hypertension  "No family hx of      Cerebrovascular Disease No family hx of      Glaucoma No family hx of      Macular Degeneration No family hx of        SOCIAL HISTORY:   Social History     Social History     Marital status:      Spouse name: Lesia     Number of children: 6     Years of education: 16.5     Occupational History      Retired     1999     Social History Main Topics     Smoking status: Never Smoker     Smokeless tobacco: Never Used     Alcohol use No     Drug use: No     Sexual activity: Yes     Partners: Female     Birth control/ protection: None     Other Topics Concern     None     Social History Narrative    Wife has lymphoma       PHYSICAL EXAMINATION:   /79  Pulse 68  Temp 97.5  F (36.4  C)  Resp 18  Ht 1.746 m (5' 8.75\")  Wt 96.6 kg (213 lb)  SpO2 99%  BMI 31.68 kg/m2  Wt Readings from Last 10 Encounters:   10/22/18 96.6 kg (213 lb)   09/25/18 94.8 kg (209 lb)   09/10/18 95.3 kg (210 lb)   08/20/18 93.4 kg (206 lb)   08/07/18 95.3 kg (210 lb)   08/06/18 95.8 kg (211 lb 3.2 oz)   07/25/18 98.7 kg (217 lb 9.6 oz)   07/16/18 98.5 kg (217 lb 3.2 oz)   03/08/18 97.1 kg (214 lb)   10/03/17 96.6 kg (213 lb)        Exam:  Constitutional: healthy, alert and no distress  Head: Normocephalic. No masses,  Neck: Neck supple. No adenopathy.  ENT: ENT exam normal, no neck nodes or sinus tenderness  Cardiovascular:  RRR.   Respiratory: Lungs clear  Gastrointestinal: Abdomen soft, non-tender. BS normal.  Musculoskeletal: extremities normal  Skin: no suspicious lesions or rashes  Neurologic: Gait normal. Sensation grossly WNL.  Psychiatric: mentation appears normal and affect normal/bright  Hematologic/Lymphatic/Immunologic: Normal cervical lymph nodes        LABORATORY RESULTS:    Recent Labs   Lab Test  08/24/18   1034  08/20/18   1624   NA  134  130*   POTASSIUM  4.4  4.6   CHLORIDE  99  95   BUN  17  22   CR  1.13  1.21   GLC  65*  102*   DORIS  9.2  9.0     Recent Labs   Lab Test  " 09/10/18   1744  09/10/18   1640  08/20/18   1624   12/07/15   1116   WBC  10.2  10.5  11.4*  Canceled, Test credited   < >  7.7   HGB  12.0*  12.0*  13.1*  13.1*   < >  12.8*   PLT  223  215  167  167   < >  165   MCV  97  98  92  92   < >  95   NEUTROPHIL  64.6   --   68.4   --   57.9    < > = values in this interval not displayed.     Recent Labs   Lab Test  09/10/18   1640  03/08/18   1203  09/01/17   1140   10/14/15   1419   BILITOTAL   --    --    --    --   0.4   ALKPHOS   --    --    --    --   92   ALT  24  14  19   < >  18   AST   --    --    --    --   19   ALBUMIN   --    --    --    --   3.9    < > = values in this interval not displayed.     Component      Latest Ref Rng & Units 9/10/2018 9/25/2018   Iron      35 - 180 ug/dL 55    Iron Binding Cap      240 - 430 ug/dL 293    Iron Saturation Index      15 - 46 % 19    % Retic      0.5 - 2.0 % 2.3 (H)    Absolute Retic      25 - 95 10e9/L 90.3    Erythropoietin      4 - 27 mU/mL 17    Ferritin      26 - 388 ng/mL 102    Methylmalonic Acid      0.00 - 0.40 umol/L 0.20    Vitamin B12      193 - 986 pg/mL 656    Folate      >5.4 ng/mL 15.2    TSH      0.40 - 4.00 mU/L  4.32 (H)   T4 Free      0.76 - 1.46 ng/dL  1.18       TEST(S):   Peripheral Smear Morphology     FINAL DIAGNOSIS:   Peripheral Smear Morphology:   - Mild normochromic, normocytic anemia without evidence of hemolysis or increased red cell regeneration.     COMMENT:   Recent lab results show normal iron studies, normal erythropoietin, normal folate, normal vitamin B12 level   and a normal absolute reticulocyte count as well as recent normal renal studies. The blood smear morphology does not provide any specific features to explain the etiology for the patient's anemia. Etiologies to exclude include occult blood loss, chronic disease, hypothyroidism, and marrow suppression from medications or infection. The normal platelet count and leukocyte count without evidence of dysplasia suggest a  primary disorder of myelopoiesis, such as a myelodysplastic syndrome, is less likely, although this is a diagnosis of exclusion and at times is not associated with morphologic evidence of dysplasia. Further clinical correlation will likely assist in further narrowing the etiology for the patient's anemia.       ASSESSMENT AND PLAN:      89-year-old male with past medical history significant for hypertension, hypothyroidism, prostate cancer status post radiation who was referred to hematology clinic today for further evaluation and recommendations on mild normocytic anemia.     Normocytic anemia    Workup done so far includes Iron studies, B12/folic acid, reticulocyte count, creatinine, erythropoietin, TSH / free T4 and smear morphology- essentially unremarkable for any obvious etiology.  Clinically doing okay overall with no active complaints/ symptoms from underlying anemia.  No concerns for an ongoing bleed on history.  One possibility could be underlying bone marrow dysfunction/MDS. Patient was informed that only way to rule that out would be to do a bone marrow aspiration and biopsy. However given his advanced age along with mild anemia, discussed with him the utility of an invasive procedure and the fact that it is likely not going to make any difference with regards to management plan  which would be continued observation at this point with consideration of supportive care/transfusion if further labs indicated worsening anemia causing symptoms.    He understands the above discussion and is agreeable not to consider bone marrow biopsy at this point. We will repeat a CBC today and if counts stable have him discharged from our clinic and continue follow-up with her primary care provider with serial CBC monitoring    The patient is ready to learn, no apparent learning barriers were identified, Diagnosis and treatment plans were explained to the patient. The patient expressed understanding of the content. The  patient questions were answered to his satisfaction.    Chart documentation with Dragon Voice recognition Software. Although reviewed after completion, some words and grammatical errors may remain.    Miguelito Cortes MD  Attending Physician   Hematology/Medical Oncology

## 2019-02-15 ENCOUNTER — OFFICE VISIT (OUTPATIENT)
Dept: FAMILY MEDICINE | Facility: CLINIC | Age: 84
End: 2019-02-15
Payer: COMMERCIAL

## 2019-02-15 VITALS
WEIGHT: 218 LBS | HEART RATE: 65 BPM | DIASTOLIC BLOOD PRESSURE: 72 MMHG | OXYGEN SATURATION: 98 % | HEIGHT: 69 IN | SYSTOLIC BLOOD PRESSURE: 146 MMHG | BODY MASS INDEX: 32.29 KG/M2

## 2019-02-15 DIAGNOSIS — E03.4 HYPOTHYROIDISM DUE TO ACQUIRED ATROPHY OF THYROID: ICD-10-CM

## 2019-02-15 DIAGNOSIS — I10 HYPERTENSION GOAL BP (BLOOD PRESSURE) < 140/90: ICD-10-CM

## 2019-02-15 DIAGNOSIS — C61 PROSTATE CANCER (H): ICD-10-CM

## 2019-02-15 DIAGNOSIS — I12.9 BENIGN HYPERTENSION WITH CHRONIC KIDNEY DISEASE, STAGE III (H): ICD-10-CM

## 2019-02-15 DIAGNOSIS — D63.1 ANEMIA IN STAGE 3 CHRONIC KIDNEY DISEASE (H): Primary | ICD-10-CM

## 2019-02-15 DIAGNOSIS — N18.30 BENIGN HYPERTENSION WITH CHRONIC KIDNEY DISEASE, STAGE III (H): ICD-10-CM

## 2019-02-15 DIAGNOSIS — N18.30 ANEMIA IN STAGE 3 CHRONIC KIDNEY DISEASE (H): Primary | ICD-10-CM

## 2019-02-15 LAB
ALBUMIN SERPL-MCNC: 3.5 G/DL (ref 3.4–5)
ANION GAP SERPL CALCULATED.3IONS-SCNC: 6 MMOL/L (ref 3–14)
BASOPHILS # BLD AUTO: 0 10E9/L (ref 0–0.2)
BASOPHILS NFR BLD AUTO: 0.2 %
BUN SERPL-MCNC: 21 MG/DL (ref 7–30)
CALCIUM SERPL-MCNC: 9.2 MG/DL (ref 8.5–10.1)
CHLORIDE SERPL-SCNC: 108 MMOL/L (ref 94–109)
CO2 SERPL-SCNC: 29 MMOL/L (ref 20–32)
CREAT SERPL-MCNC: 1.23 MG/DL (ref 0.66–1.25)
DIFFERENTIAL METHOD BLD: NORMAL
EOSINOPHIL # BLD AUTO: 0.3 10E9/L (ref 0–0.7)
EOSINOPHIL NFR BLD AUTO: 3.1 %
ERYTHROCYTE [DISTWIDTH] IN BLOOD BY AUTOMATED COUNT: 13 % (ref 10–15)
GFR SERPL CREATININE-BSD FRML MDRD: 51 ML/MIN/{1.73_M2}
GLUCOSE SERPL-MCNC: 153 MG/DL (ref 70–99)
HCT VFR BLD AUTO: 41.2 % (ref 40–53)
HGB BLD-MCNC: 13.5 G/DL (ref 13.3–17.7)
LYMPHOCYTES # BLD AUTO: 2.4 10E9/L (ref 0.8–5.3)
LYMPHOCYTES NFR BLD AUTO: 27.7 %
MCH RBC QN AUTO: 30.7 PG (ref 26.5–33)
MCHC RBC AUTO-ENTMCNC: 32.8 G/DL (ref 31.5–36.5)
MCV RBC AUTO: 94 FL (ref 78–100)
MONOCYTES # BLD AUTO: 0.6 10E9/L (ref 0–1.3)
MONOCYTES NFR BLD AUTO: 7.1 %
NEUTROPHILS # BLD AUTO: 5.3 10E9/L (ref 1.6–8.3)
NEUTROPHILS NFR BLD AUTO: 61.9 %
PHOSPHATE SERPL-MCNC: 3.1 MG/DL (ref 2.5–4.5)
PLATELET # BLD AUTO: 175 10E9/L (ref 150–450)
POTASSIUM SERPL-SCNC: 4.2 MMOL/L (ref 3.4–5.3)
RBC # BLD AUTO: 4.4 10E12/L (ref 4.4–5.9)
SODIUM SERPL-SCNC: 143 MMOL/L (ref 133–144)
T4 FREE SERPL-MCNC: 1.07 NG/DL (ref 0.76–1.46)
TSH SERPL DL<=0.005 MIU/L-ACNC: 4.45 MU/L (ref 0.4–4)
WBC # BLD AUTO: 8.5 10E9/L (ref 4–11)

## 2019-02-15 PROCEDURE — 85025 COMPLETE CBC W/AUTO DIFF WBC: CPT | Performed by: FAMILY MEDICINE

## 2019-02-15 PROCEDURE — 84439 ASSAY OF FREE THYROXINE: CPT | Performed by: FAMILY MEDICINE

## 2019-02-15 PROCEDURE — 36415 COLL VENOUS BLD VENIPUNCTURE: CPT | Performed by: FAMILY MEDICINE

## 2019-02-15 PROCEDURE — 80061 LIPID PANEL: CPT | Performed by: FAMILY MEDICINE

## 2019-02-15 PROCEDURE — 80069 RENAL FUNCTION PANEL: CPT | Performed by: FAMILY MEDICINE

## 2019-02-15 PROCEDURE — 84460 ALANINE AMINO (ALT) (SGPT): CPT | Performed by: FAMILY MEDICINE

## 2019-02-15 PROCEDURE — 84443 ASSAY THYROID STIM HORMONE: CPT | Performed by: FAMILY MEDICINE

## 2019-02-15 PROCEDURE — 99214 OFFICE O/P EST MOD 30 MIN: CPT | Performed by: FAMILY MEDICINE

## 2019-02-15 ASSESSMENT — PAIN SCALES - GENERAL: PAINLEVEL: NO PAIN (0)

## 2019-02-15 ASSESSMENT — MIFFLIN-ST. JEOR: SCORE: 1630.25

## 2019-02-15 NOTE — PATIENT INSTRUCTIONS
================================================================================  Normal Values   Blood pressure  <140/90 for most adults    <130/80 for some chronic diseases (ask your care team about yours)    BMI (body mass index)  18.5-25 kg/m2 (based on height and weight)     Thank you for visiting [facility]    Normal or non-critical lab and imaging results will be communicated to you by MyChart, letter or phone within 7 days.  If you do not hear from us within 10 days, please call the clinic. If you have a critical or abnormal lab result, we will notify you by phone as soon as possible.     If you have any questions regarding your visit please contact:     Team Comfort:   Clinic Hours Telephone Number   Dr. Juan Carlos Rolon [address] [place] 7am-5pm  Monday - Friday [phone]  Eusebio Bal RN   Pharmacy 8:00am-8pm Monday-Friday    9am-5pm Saturday-Sunday [phone]   Urgent Care 11am-9pm Monday-Friday        9am-5pm Saturday-Sunday [phone]     After hours, weekend or if you need to make an appointment with your primary provider please call [phone].   After Hours nurse advise: call [facility]: [phone]    Medication Refills:  Call your pharmacy and they will forward the refill to us. Please allow 3 business days for your refills to be completed.          Patient Education     Anemia  Anemia is a condition that occurs when your body does not have enough healthy red blood cells (RBCs). RBCs are the parts of your blood that carry oxygen throughout your body. A protein called hemoglobin allows your RBCs to absorb and release oxygen. Without enough RBCs or hemoglobin, your body doesn't get enough oxygen. Symptoms of anemia may then occur.    What are the symptoms of anemia?  Some people with anemia have no symptoms. But most people have symptoms that range from mild to severe. These can include:    Tiredness  (fatigue)    Weakness    Pale skin    Shortness of breath    Dizziness or fainting    Rapid heartbeat    Trouble doing normal amounts of activity    Jaundice (yellowing of your eyes, skin, or mouth; dark urine)  What causes anemia?  Anemia can occur when your body:    Loses too much blood    Does not make enough RBCs    Destroys your RBCs at a faster rate than it can replace them    Does not make a normal amount of hemoglobin in your RBCs  These problems can occur for many reasons, including:    A condition that you are born with (congenital or inherited), such as sickle cell disease or thalassemia    Heavy bleeding for any reason, including injury, surgery, childbirth, or even heavy menstrual periods    Being low in certain nutrients, such as iron, folate, or vitamin B12, possibly from a poor diet or a condition like celiac disease or Crohn's disease    Certain chronic conditions like diabetes, arthritis, or kidney disease    Certain chronic infections like tuberculosis or HIV    Exposure to certain medicines, such as those used for chemotherapy  There are different types of anemia. Your healthcare provider can tell you more about the type of anemia you have and what may have caused it.  How is anemia diagnosed?  To diagnose anemia, your healthcare provider orders blood tests. These can include:    Complete blood cell count (CBC). This test measures the amounts of the different types of blood cells.    Blood smear. This test checks the size and shape of your blood cells. To do the test, a drop of your blood is viewed under a microscope. A stain is used to make the blood cells easier to see.    Iron studies. These tests measure the amount of iron in your blood. Your body needs iron to make hemoglobin in your RBCs.    Vitamin B12 and folate studies. These tests check for some of the components that help give RBCs a normal size and shape.    Reticulocyte count. This test measures the amount of new RBCs that your bone  marrow makes.    Hemoglobin electrophoresis. This test checks for problems with your hemoglobin in RBCs.  How is anemia treated?  Treatment for anemia is based on the type of anemia, its cause, and the severity of your symptoms. Treatments may include:    Diet changes. This involves increasing the amount of certain nutrients in your diet, such as iron, vitamin B12, or folate. Your healthcare provider may also prescribe nutrient supplements.    Medicines. Certain medicines treat the cause of your anemia. Others help build new RBCs or relieve symptoms. If a medicine is the cause of your anemia, you may need to stop or change it.    Blood transfusions. Replacing some of your blood can increase the number of healthy RBCs in your body.    Surgery. In some cases, your doctor may do surgery to treat the underlying cause of anemia. If you need surgery, your healthcare provider will explain the procedure and outline the risks and benefits for you.  What are the long-term concerns?  If you have a certain type of anemia, you can expect a full recovery after treatment. If you have other types of anemia (especially a type you're born with), you will need to manage it for life. Your doctor can tell you more.  Date Last Reviewed: 12/1/2016 2000-2018 [employer]. [address] [place] [address]. All rights reserved. This information is not intended as a substitute for professional medical care. Always follow your healthcare professional's instructions.           Patient Education     Healthy Kidneys    Healthy kidneys clean the blood. Two normal kidneys can filter wastes and excess fluid from hundreds of pints of blood each day. This helps maintain the chemical balance the body needs to stay healthy and alive. The kidneys also carry out many other vital functions. This includes controlling blood pressure, maintaining healthy bones, and signaling the bone marrow to make red blood cells. The kidneys are  bean-shaped, about the size of a fist, and located just below the rib cage near the midline of the back.   If both kidneys fail, it's called chronic kidney disease. Waste made during normal cell functions builds up in the blood (uremia). Over time, this can threaten your health. Some people are born with 1 kidney, or donate a kidney during their lifetime. People can function normally with just 1 healthy kidney, or even a part of one kidney. Some conditions that cause kidney damage include:    Diabetes    High blood pressure    Smoking    Injury    Immune system diseases    Hereditary conditions    Certain medicines   Removing wastes  The kidneys are part of a system that removes wastes from your body. For this system to work, the kidneys and urinary tract must do their jobs.  Vessels carry blood  Tiny blood vessels inside the kidneys carry blood to the filtering units (nephrons). These vessels also shrink or expand to control the pressure inside the kidneys.  Filters clean blood  Blood is cleaned as it passes through the nephrons. Each kidney has about 1 million nephrons. Wastes and excess fluid are taken and removed from the body in urine .The proper amounts of clean fluid and vital chemicals (salts and enzymes) are returned to the blood.  Urinary tract removes wastes  Two tubes, called the ureters, connect the kidneys with the bladder (where urine collects). When the bladder is full, the urine is passed out of the body through a tube (urethra) during urination.  Date Last Reviewed: 12/1/2016 2000-2018 [employer]. [address] [place] [address], [place] [address]. All rights reserved. This information is not intended as a substitute for professional medical care. Always follow your healthcare professional's instructions.

## 2019-02-15 NOTE — PROGRESS NOTES
SUBJECTIVE:   James Lama is a 89 year old male who presents to clinic today for the following health issues:      Hypertension Follow-up      Outpatient blood pressures are being checked at home.  Results are 180's over 70 .    Low Salt Diet: no added salt      Amount of exercise or physical activity: 2-3 days/week for an average of less than 15 minutes    Problems taking medications regularly: No    Medication side effects: none    Diet: regular (no restrictions)        Hypertension Follow-up      Outpatient blood pressures are not being checked.    Low Salt Diet: no added salt    Chronic Kidney Disease Follow-up      Current NSAID use?  No    Hypothyroidism Follow-up      Since last visit, patient describes the following symptoms: Weight stable, no hair loss, no skin changes, no constipation, no loose stools    History of prostate cancer. No current symptoms but is feeling more tired than usual. Some increased weakness but nothing specific.     Bump on left ear that is not healing. Scheduled with dermatology next month.    Problem list and histories reviewed & adjusted, as indicated.  Additional history: as documented    Patient Active Problem List   Diagnosis     Prostate cancer (H)     Hyperlipidemia LDL goal <130     Benign hypertension with chronic kidney disease, stage III (H)     KESHA (obstructive sleep apnea)     Gout     Diverticulosis     Perennial allergic rhinitis     GERD (gastroesophageal reflux disease)     Hypothyroidism due to acquired atrophy of thyroid     Erectile dysfunction     Hypertension goal BP (blood pressure) < 140/90     Right inguinal hernia     History of basal cell carcinoma     Advance care planning     Paresthesias/numbness, both feet     Vitamin B12 deficiency (non anemic)     Health Care Home     Actinic keratosis     Obesity, Class I, BMI 30-34.9     Recurrent falls     Cortical cataract of both eyes     Nuclear sclerosis of both eyes     Chronic kidney disease, stage 3  (moderate) (H)     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 (H)     Perennial allergic rhinitis, unspecified allergic rhinitis trigger     Cognitive impairment, mild, so stated     Hyponatremia     Past Surgical History:   Procedure Laterality Date     EXC SKIN MALIG 0.5CM OR LESS,FACIAL      BCC nasal tip     EXC SKIN MALIG 0.6-1CM FACE,FACIAL      BCC left ala     EXC SKIN MALIG 1.1-2CM FACE,FACIAL  10/09    BCC forehead     EXC SKIN MALIG 1.1-2CM TRUNK,ARM,LEG      BCC upper back     HERNIA REPAIR, INGUINAL RT/LT  ~    LT       Social History     Tobacco Use     Smoking status: Never Smoker     Smokeless tobacco: Never Used   Substance Use Topics     Alcohol use: No     Family History   Problem Relation Age of Onset     Cancer Father         , unk type     Myocardial Infarction Sister      Thyroid Disease Sister      Diabetes No family hx of      Hypertension No family hx of      Cerebrovascular Disease No family hx of      Glaucoma No family hx of      Macular Degeneration No family hx of          Current Outpatient Medications   Medication Sig Dispense Refill     acetaminophen (TYLENOL) 500 MG tablet Take 2 tablets by mouth 2 times daily as needed for pain (knee).  0     ASPIRIN 81 MG OR TABS 1 tablet daily*       Cyanocobalamin 1000 MCG TABS 1 tablet daily for foot neuropathy (vitamin B12).       fluticasone (FLONASE) 50 MCG/ACT spray USE ONE TO TWO SPRAY(S) IN EACH NOSTRIL ONCE DAILY FOR  ALLERGY  PREVENTION 1 Bottle 1     indomethacin (INDOCIN) 50 MG capsule Take 1 capsule (50 mg) by mouth 3 times daily (with meals) as needed for gouty attack. 40 capsule 1     levothyroxine (SYNTHROID/LEVOTHROID) 88 MCG tablet Take 1 tablet (88 mcg) by mouth daily for thyroid. 90 tablet 1     lisinopril (PRINIVIL/ZESTRIL) 40 MG tablet Take 1 tablet (40 mg) by mouth daily for blood pressure. 90 tablet 1     lovastatin (MEVACOR) 20 MG tablet Take 1 tablet (20 mg) by mouth every evening for cholesterol. 90  "tablet 1     metoprolol succinate (TOPROL-XL) 25 MG 24 hr tablet Take 1 tablet daily for blood pressure. 90 tablet 1     Multiple Vitamins-Minerals (PRESERVISION AREDS PO) Take 1 capsule by mouth 2 times daily (with meals) (PreserVision AREDS 2)       omeprazole (PRILOSEC) 20 MG CR capsule Take 2 capsules (40 mg) by mouth daily for reflux. Take 30-60 minutes before a meal. 180 capsule 3     polyethylene glycol (MIRALAX) powder Take 17 g (1 capful) by mouth daily 510 g 1     VITAMIN D, CHOLECALCIFEROL, PO Take 1,000 Units by mouth daily        Allergies   Allergen Reactions     Acrylate Copolymer Rash     topical     Recent Labs   Lab Test 02/15/19  1439 09/25/18  1139 09/10/18  1640 08/24/18  1034  03/08/18  1203 09/01/17  1140  10/14/15  1419   A1C  --   --   --   --   --   --   --   --  6.0   LDL  --   --  63  --   --  67 73   < >  --    HDL  --   --  45  --   --  38* 41   < >  --    TRIG  --   --  272*  --   --  216* 257*   < >  --    ALT  --   --  24  --   --  14 19   < > 18   CR 1.23  --   --  1.13   < > 1.13 1.25   < > 1.39*   GFRESTIMATED 51*  --   --  61   < > 61 54*   < > 48*   GFRESTBLACK 59*  --   --  74   < > 74 66   < > 58*   POTASSIUM 4.2  --   --  4.4   < > 4.2 4.4   < > 4.5   TSH 4.45* 4.32*  --   --   --  3.62 2.74   < >  --     < > = values in this interval not displayed.      BP Readings from Last 3 Encounters:   02/15/19 146/72   10/22/18 144/79   09/25/18 136/68    Wt Readings from Last 3 Encounters:   02/15/19 98.9 kg (218 lb)   10/22/18 96.6 kg (213 lb)   09/25/18 94.8 kg (209 lb)                  Labs reviewed in EPIC    Reviewed and updated as needed this visit by clinical staff  Tobacco  Allergies  Meds  Med Hx  Surg Hx  Fam Hx  Soc Hx      Reviewed and updated as needed this visit by Provider         ROS:  Constitutional, HEENT, cardiovascular, pulmonary, gi and gu systems are negative, except as otherwise noted.    OBJECTIVE:     /72   Pulse 65   Ht 1.746 m (5' 8.75\")   Wt " 98.9 kg (218 lb)   SpO2 98%   BMI 32.43 kg/m    Body mass index is 32.43 kg/m .  GENERAL: elderly, alert, well nourished, well hydrated, no distress  HENT: ear canals- normal; TMs- normal; Nose- normal; Mouth- no ulcers, no lesions, missing dentition  NECK: no tenderness, no adenopathy, no asymmetry, no masses, no stiffness; thyroid- normal to palpation  RESP: lungs clear to auscultation - no rales, no rhonchi, no wheezes  CV: regular rates and rhythm, normal S1 S2, no S3 or S4 and no murmur, no click or rub, normal pulses  ABDOMEN: soft, no tenderness, no  hepatosplenomegaly, no masses, normal bowel sounds  MS: extremities- no gross deformities noted, no edema  SKIN: rough area on left ear consistent with actinic keratosis or early basal cell, no rashes, age related skin changes with seborrheic keratosis.   NEURO: strength and tone- decreased, sensory exam- grossly normal, reflexes- symmetric  BACK: no CVA tenderness, no paralumbar tenderness  MENTAL STATUS EXAM:  Appearance/Behavior: no apparent distress, neatly groomed, dressed appropriately for weather, appears stated age and is frail-appearing  Speech: normal  Mood/Affect: normal affect  Insight: Fair     Diagnostic Test Results:  Results for orders placed or performed in visit on 02/15/19 (from the past 24 hour(s))   Renal panel   Result Value Ref Range    Sodium 143 133 - 144 mmol/L    Potassium 4.2 3.4 - 5.3 mmol/L    Chloride 108 94 - 109 mmol/L    Carbon Dioxide 29 20 - 32 mmol/L    Anion Gap 6 3 - 14 mmol/L    Glucose 153 (H) 70 - 99 mg/dL    Urea Nitrogen 21 7 - 30 mg/dL    Creatinine 1.23 0.66 - 1.25 mg/dL    GFR Estimate 51 (L) >60 mL/min/[1.73_m2]    GFR Estimate If Black 59 (L) >60 mL/min/[1.73_m2]    Calcium 9.2 8.5 - 10.1 mg/dL    Phosphorus 3.1 2.5 - 4.5 mg/dL    Albumin 3.5 3.4 - 5.0 g/dL   TSH with free T4 reflex   Result Value Ref Range    TSH 4.45 (H) 0.40 - 4.00 mU/L   CBC with platelets differential   Result Value Ref Range    WBC 8.5 4.0  "- 11.0 10e9/L    RBC Count 4.40 4.4 - 5.9 10e12/L    Hemoglobin 13.5 13.3 - 17.7 g/dL    Hematocrit 41.2 40.0 - 53.0 %    MCV 94 78 - 100 fl    MCH 30.7 26.5 - 33.0 pg    MCHC 32.8 31.5 - 36.5 g/dL    RDW 13.0 10.0 - 15.0 %    Platelet Count 175 150 - 450 10e9/L    % Neutrophils 61.9 %    % Lymphocytes 27.7 %    % Monocytes 7.1 %    % Eosinophils 3.1 %    % Basophils 0.2 %    Absolute Neutrophil 5.3 1.6 - 8.3 10e9/L    Absolute Lymphocytes 2.4 0.8 - 5.3 10e9/L    Absolute Monocytes 0.6 0.0 - 1.3 10e9/L    Absolute Eosinophils 0.3 0.0 - 0.7 10e9/L    Absolute Basophils 0.0 0.0 - 0.2 10e9/L    Diff Method Automated Method    T4 free   Result Value Ref Range    T4 Free 1.07 0.76 - 1.46 ng/dL       ASSESSMENT/PLAN:         Tobacco Cessation:   reports that  has never smoked. he has never used smokeless tobacco.      BMI:   Estimated body mass index is 32.43 kg/m  as calculated from the following:    Height as of this encounter: 1.746 m (5' 8.75\").    Weight as of this encounter: 98.9 kg (218 lb).   Weight management plan: Discussed healthy diet and exercise guidelines        ICD-10-CM    1. 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 (H)- hemoglobin improved N18.3 CBC with platelets differential    D63.1 T4 free     T4 free   2. Prostate cancer (H) C61 No sign of urine or kidney problems   3. Hypertension goal BP (blood pressure) < 140/90 I10 Renal panel- blood pressure a little elevated but lower at home. Will keep medication the same for now and follow up with primary care provider    4. Benign hypertension with chronic kidney disease, stage III (H) I12.9 Doing well    N18.3    5. Hypothyroidism due to acquired atrophy of thyroid E03.4 TSH with free T4 reflex- stable        CONSULTATION/REFERRAL to hematology and urology as needed  FUTURE LABS:       - Schedule fasting labs in 3 months  FUTURE APPOINTMENTS:       - Follow-up visit in 3 months or sooner if any questions or concerns.   See Patient Instructions    Lou " Tricia Zacarias MD  [facility]

## 2019-02-15 NOTE — LETTER
February 18, 2019        James Lama  [address]  [place] [address]        Dear James,    Your test results are attached. I am happy to let you know that they are stable and your medications can stay the same.    The thyroid is normal. The kidneys are stable. The blood sugar is a little high but you were not fasting. We can recheck labs in 6 months.     Please call me if you have any questions about these test results or about your care.    Sincerely,      Lou Zacarias MD/tomas      Resulted Orders   Renal panel   Result Value Ref Range    Sodium 143 133 - 144 mmol/L    Potassium 4.2 3.4 - 5.3 mmol/L    Chloride 108 94 - 109 mmol/L    Carbon Dioxide 29 20 - 32 mmol/L    Anion Gap 6 3 - 14 mmol/L    Glucose 153 (H) 70 - 99 mg/dL    Urea Nitrogen 21 7 - 30 mg/dL    Creatinine 1.23 0.66 - 1.25 mg/dL    GFR Estimate 51 (L) >60 mL/min/[1.73_m2]      Comment:      Non  GFR Calc  Starting 12/18/2018, serum creatinine based estimated GFR (eGFR) will be   calculated using the Chronic Kidney Disease Epidemiology Collaboration   (CKD-EPI) equation.      GFR Estimate If Black 59 (L) >60 mL/min/[1.73_m2]      Comment:       GFR Calc  Starting 12/18/2018, serum creatinine based estimated GFR (eGFR) will be   calculated using the Chronic Kidney Disease Epidemiology Collaboration   (CKD-EPI) equation.      Calcium 9.2 8.5 - 10.1 mg/dL    Phosphorus 3.1 2.5 - 4.5 mg/dL    Albumin 3.5 3.4 - 5.0 g/dL   TSH with free T4 reflex   Result Value Ref Range    TSH 4.45 (H) 0.40 - 4.00 mU/L   CBC with platelets differential   Result Value Ref Range    WBC 8.5 4.0 - 11.0 10e9/L    RBC Count 4.40 4.4 - 5.9 10e12/L    Hemoglobin 13.5 13.3 - 17.7 g/dL    Hematocrit 41.2 40.0 - 53.0 %    MCV 94 78 - 100 fl    MCH 30.7 26.5 - 33.0 pg    MCHC 32.8 31.5 - 36.5 g/dL    RDW 13.0 10.0 - 15.0 %    Platelet Count 175 150 - 450 10e9/L    % Neutrophils 61.9 %    % Lymphocytes 27.7 %    %  Monocytes 7.1 %    % Eosinophils 3.1 %    % Basophils 0.2 %    Absolute Neutrophil 5.3 1.6 - 8.3 10e9/L    Absolute Lymphocytes 2.4 0.8 - 5.3 10e9/L    Absolute Monocytes 0.6 0.0 - 1.3 10e9/L    Absolute Eosinophils 0.3 0.0 - 0.7 10e9/L    Absolute Basophils 0.0 0.0 - 0.2 10e9/L    Diff Method Automated Method    T4 free   Result Value Ref Range    T4 Free 1.07 0.76 - 1.46 ng/dL

## 2019-02-16 NOTE — RESULT ENCOUNTER NOTE
Dear James Lama,    Your test results are attached. I am happy to let you know that they are stable and your medications can stay the same.    The thyroid is normal. The kidneys are stable. The blood sugar is a little high but you were not fasting. We can recheck labs in 6 months.     Please call me if you have any questions about these test results or about your care.    Sincerely,    Lou Zacarias MD

## 2019-02-21 ENCOUNTER — OFFICE VISIT (OUTPATIENT)
Dept: FAMILY MEDICINE | Facility: CLINIC | Age: 84
End: 2019-02-21
Payer: COMMERCIAL

## 2019-02-21 VITALS
TEMPERATURE: 97.5 F | HEIGHT: 69 IN | HEART RATE: 72 BPM | OXYGEN SATURATION: 98 % | WEIGHT: 217 LBS | DIASTOLIC BLOOD PRESSURE: 60 MMHG | SYSTOLIC BLOOD PRESSURE: 138 MMHG | BODY MASS INDEX: 32.14 KG/M2

## 2019-02-21 DIAGNOSIS — I12.9 BENIGN HYPERTENSION WITH CHRONIC KIDNEY DISEASE, STAGE III (H): Primary | ICD-10-CM

## 2019-02-21 DIAGNOSIS — N18.30 BENIGN HYPERTENSION WITH CHRONIC KIDNEY DISEASE, STAGE III (H): Primary | ICD-10-CM

## 2019-02-21 DIAGNOSIS — E03.4 HYPOTHYROIDISM DUE TO ACQUIRED ATROPHY OF THYROID: ICD-10-CM

## 2019-02-21 DIAGNOSIS — E78.5 HYPERLIPIDEMIA LDL GOAL <130: ICD-10-CM

## 2019-02-21 LAB
ALT SERPL W P-5'-P-CCNC: 16 U/L (ref 0–70)
CHOLEST SERPL-MCNC: 170 MG/DL
HDLC SERPL-MCNC: 35 MG/DL
LDLC SERPL CALC-MCNC: 79 MG/DL
NONHDLC SERPL-MCNC: 135 MG/DL
TRIGL SERPL-MCNC: 281 MG/DL

## 2019-02-21 PROCEDURE — 99214 OFFICE O/P EST MOD 30 MIN: CPT | Performed by: FAMILY MEDICINE

## 2019-02-21 RX ORDER — METOPROLOL SUCCINATE 50 MG/1
50 TABLET, EXTENDED RELEASE ORAL EVERY EVENING
Qty: 90 TABLET | Refills: 1 | Status: SHIPPED | OUTPATIENT
Start: 2019-02-21 | End: 2019-09-09

## 2019-02-21 RX ORDER — LEVOTHYROXINE SODIUM 88 UG/1
88 TABLET ORAL DAILY
Qty: 90 TABLET | Refills: 1 | Status: SHIPPED | OUTPATIENT
Start: 2019-02-21 | End: 2019-09-09

## 2019-02-21 RX ORDER — LISINOPRIL 40 MG/1
40 TABLET ORAL DAILY
Qty: 90 TABLET | Refills: 1 | Status: SHIPPED | OUTPATIENT
Start: 2019-02-21 | End: 2019-09-09

## 2019-02-21 RX ORDER — LOVASTATIN 20 MG
20 TABLET ORAL EVERY EVENING
Qty: 90 TABLET | Refills: 1 | Status: SHIPPED | OUTPATIENT
Start: 2019-02-21 | End: 2019-09-09

## 2019-02-21 ASSESSMENT — PAIN SCALES - GENERAL: PAINLEVEL: NO PAIN (0)

## 2019-02-21 ASSESSMENT — MIFFLIN-ST. JEOR: SCORE: 1625.72

## 2019-02-21 NOTE — PROGRESS NOTES
"  SUBJECTIVE:   James Lama is a 89 year old male who presents to clinic today for the following health issues:  He is accompanied by his wife.     Hypertension Follow-up      Outpatient blood pressures are being checked at home.  Results are high.    Low Salt Diet: low salt      Amount of exercise or physical activity: None    Problems taking medications regularly: No    Medication side effects: none    Diet: regular (no restrictions)    Past medical, family, and social histories, medications, and allergies are reviewed and updated in Epic.     ROS:  Constitutional, HEENT, cardiovascular, pulmonary, gi and gu systems are negative, except as otherwise noted. Still gets swelling on tops of feet as day goes on, not as bad as when he was taking amlodipine     OBJECTIVE:     /55   Pulse 63   Temp 97.5  F (36.4  C) (Oral)   Ht 1.746 m (5' 8.75\")   Wt 98.4 kg (217 lb)   SpO2 98%   BMI 32.28 kg/m    Body mass index is 32.28 kg/m .  GENERAL: healthy, alert and no distress  EYES: Eyes grossly normal to inspection, PERRL, EOMI, sclerae white and conjunctivae normal  RESP: lungs clear to auscultation - no crackles or wheezes, no areas of dullness, no tachypnea  CV: Heart regular rate and rhythm without murmur, click or rub.  MS: no gross musculoskeletal defects noted, no edema  SKIN: no suspicious lesions or rashes  NEURO: Normal strength and tone, sensory exam grossly normal, mentation intact, oriented times 3 and cranial nerves 2-12 intact  PSYCH: mentation appears normal, affect normal/bright     Diagnostic Test Results:  Results for orders placed or performed in visit on 02/15/19   Renal panel   Result Value Ref Range    Sodium 143 133 - 144 mmol/L    Potassium 4.2 3.4 - 5.3 mmol/L    Chloride 108 94 - 109 mmol/L    Carbon Dioxide 29 20 - 32 mmol/L    Anion Gap 6 3 - 14 mmol/L    Glucose 153 (H) 70 - 99 mg/dL    Urea Nitrogen 21 7 - 30 mg/dL    Creatinine 1.23 0.66 - 1.25 mg/dL    GFR Estimate 51 (L) >60 " mL/min/[1.73_m2]    GFR Estimate If Black 59 (L) >60 mL/min/[1.73_m2]    Calcium 9.2 8.5 - 10.1 mg/dL    Phosphorus 3.1 2.5 - 4.5 mg/dL    Albumin 3.5 3.4 - 5.0 g/dL   TSH with free T4 reflex   Result Value Ref Range    TSH 4.45 (H) 0.40 - 4.00 mU/L   CBC with platelets differential   Result Value Ref Range    WBC 8.5 4.0 - 11.0 10e9/L    RBC Count 4.40 4.4 - 5.9 10e12/L    Hemoglobin 13.5 13.3 - 17.7 g/dL    Hematocrit 41.2 40.0 - 53.0 %    MCV 94 78 - 100 fl    MCH 30.7 26.5 - 33.0 pg    MCHC 32.8 31.5 - 36.5 g/dL    RDW 13.0 10.0 - 15.0 %    Platelet Count 175 150 - 450 10e9/L    % Neutrophils 61.9 %    % Lymphocytes 27.7 %    % Monocytes 7.1 %    % Eosinophils 3.1 %    % Basophils 0.2 %    Absolute Neutrophil 5.3 1.6 - 8.3 10e9/L    Absolute Lymphocytes 2.4 0.8 - 5.3 10e9/L    Absolute Monocytes 0.6 0.0 - 1.3 10e9/L    Absolute Eosinophils 0.3 0.0 - 0.7 10e9/L    Absolute Basophils 0.0 0.0 - 0.2 10e9/L    Diff Method Automated Method    T4 free   Result Value Ref Range    T4 Free 1.07 0.76 - 1.46 ng/dL       ASSESSMENT/PLAN:     (I12.9,  N18.3) Benign hypertension with chronic kidney disease, stage III (H)  (primary encounter diagnosis)  Comment: borderline controlled  Plan: lisinopril (PRINIVIL/ZESTRIL) 40 MG tablet,         metoprolol succinate ER (TOPROL-XL) 50 MG 24 hr        tablet        Increase metoprolol to 50 mg. Move metoprolol to evening. Return in about 6 months (around 8/21/2019) for full physical, blood pressure check, lab tests, recheck medications.     (E03.4) Hypothyroidism due to acquired atrophy of thyroid  Comment: borderline TSH x 2, but fT4 normal  Plan: levothyroxine (SYNTHROID/LEVOTHROID) 88 MCG         tablet        Recheck 6 mo, consider increasing dose then as needed    (E78.5) Hyperlipidemia LDL goal <130  Comment:   Plan: lovastatin (MEVACOR) 20 MG tablet, Lipid panel         reflex to direct LDL Non-fasting, ALT            Juan Carlos Jansen MD

## 2019-02-21 NOTE — LETTER
February 27, 2019      James Lama  [address]  [place] [address]        Dear ,    We are writing to inform you of your test results. All of your labs were normal or typical for you.  Your LDL (bad cholesterol) was at goal.  Your HDL (good cholesterol) was below my goal for you (>45 in men and > 55 in women).  Genetics and inactivity can contribute to this.  Your triglycerides were above normal.  Poor diet, genetics and being overweight can contribute to this. You need to recheck fasting labs every 6 month.    Please contact the clinic if you have additional questions. Thank you.    Sincerely,  Juan Carlos Jansen MD/simba      Resulted Orders   Lipid panel reflex to direct LDL Non-fasting   Result Value Ref Range    Cholesterol 170 <200 mg/dL    Triglycerides 281 (H) <150 mg/dL      Comment:      Borderline high:  150-199 mg/dl  High:             200-499 mg/dl  Very high:       >499 mg/dl      HDL Cholesterol 35 (L) >39 mg/dL    LDL Cholesterol Calculated 79 <100 mg/dL      Comment:      Desirable:       <100 mg/dl    Non HDL Cholesterol 135 (H) <130 mg/dL      Comment:      Above Desirable:  130-159 mg/dl  Borderline high:  160-189 mg/dl  High:             190-219 mg/dl  Very high:       >219 mg/dl     ALT   Result Value Ref Range    ALT 16 0 - 70 U/L

## 2019-02-21 NOTE — PATIENT INSTRUCTIONS
================================================================================  Normal Values   Blood pressure  <140/90 for most adults    <130/80 for some chronic diseases (ask your care team about yours)    BMI (body mass index)  18.5-25 kg/m2 (based on height and weight)     Thank you for visiting [facility]    Normal or non-critical lab and imaging results will be communicated to you by MyChart, letter or phone within 7 days.  If you do not hear from us within 10 days, please call the clinic. If you have a critical or abnormal lab result, we will notify you by phone as soon as possible.     If you have any questions regarding your visit please contact:     Team Comfort:   Clinic Hours Telephone Number   Dr. Juan Carlos Rolon Dr[address] [place] 7am-5pm  Monday - Friday [phone]  [place] RN  Cyndi RN  Mally RN   Pharmacy 8:00am-8pm Monday-Friday    9am-5pm Saturday-Sunday [phone]   Urgent Care 11am-9pm Monday-Friday        9am-5pm Saturday-Sunday [phone]     After hours, weekend or if you need to make an appointment with your primary provider please call [phone].   After Hours nurse advise: call [facility]: [phone]    Medication Refills:  Call your pharmacy and they will forward the refill to us. Please allow 3 business days for your refills to be completed.

## 2019-02-25 ENCOUNTER — TELEPHONE (OUTPATIENT)
Dept: FAMILY MEDICINE | Facility: CLINIC | Age: 84
End: 2019-02-25

## 2019-02-25 NOTE — TELEPHONE ENCOUNTER
James Lama is a 89 year old male who calls with elevated blood pressure.    NURSING ASSESSMENT:  Description:  Patient reports last evening he had an elevated blood pressure reading of 183/104 when at home. Patient then checked earlier this AM and it was elevated so they went to [facility] since they were concerned their at-home BP machine was not working properly. An hour ago, when at [facility] using their automatic blood pressure cuff, patient's BP was 200/90. During phone call patient was asked to check is BP at home - during telephone call patient's BP was 191/85.   Onset/duration:  Since yesterday evening  Precip. factors:  Recently OV on 2/21/19 for BP follow-up. Patient's Metoprolol was increased. History of HTN diagnosis.   Associated symptoms:  Blood pressure reading of 191/85 while on phone triage.   Patient denies symptoms such as chest pain, severe headache, blurred or double vision, nausea or vomiting, drowsiness or confusion, numbness or tingling of hands or feet, coughing up blood, difficulty breathing/SOB, nosebleeds, severe weakness, current diastolic BP >110 mm Hg, severe weakness, dizziness/lightheadedness, changes to OTC medications, caffeine intake.  Improves/worsens symptoms:  Nothing  Pain scale (0-10)   0/10  LMP/preg/breast feeding:  N/A  Last exam/Treatment:  2/21/19  Allergies:   Allergies   Allergen Reactions     Acrylate Copolymer Rash     topical       MEDICATIONS:   Taking medication(s) as prescribed? Yes  Taking over the counter medication(s?) Yes  Any medication side effects? No significant side effects    Any barriers to taking medication(s) as prescribed?  No  Medication(s) improving/managing symptoms?  Patient has been taking Lisinopril and Metoprolol as recently directed to from OV on 2/21/19.  Medication reconciliation completed: Yes      NURSING PLAN: Routed to provider Yes    RECOMMENDED DISPOSITION:  Home care advice - Avoid caffeine, smoking or alchohol consumption. Avoid  excess salt. Deep breathing and trying to relax. Avoid exerting activities at this time. Patient asked if he could snow blow the driveway but writer advised patient to rest/avoid exertion until provider advises.   Writer advised patient this assessment would be sent to Dr. Jansen to review and advise on - but if patient experiences any of the bolded emergent symptoms listed above, to call 911 immediately to be evaluated in the ED, patient and spouse state understanding.   Will comply with recommendation: Yes  If further questions/concerns or if symptoms do not improve, worsen or new symptoms develop, call your PCP or [facility] as soon as possible.      Guideline used:  Telephone Triage Protocols for Nurses, Fifth Edition, Eva Cody RN

## 2019-02-26 ENCOUNTER — OFFICE VISIT (OUTPATIENT)
Dept: FAMILY MEDICINE | Facility: CLINIC | Age: 84
End: 2019-02-26
Payer: COMMERCIAL

## 2019-02-26 VITALS
HEIGHT: 69 IN | DIASTOLIC BLOOD PRESSURE: 63 MMHG | OXYGEN SATURATION: 98 % | BODY MASS INDEX: 31.99 KG/M2 | RESPIRATION RATE: 18 BRPM | HEART RATE: 59 BPM | SYSTOLIC BLOOD PRESSURE: 140 MMHG | TEMPERATURE: 97.5 F | WEIGHT: 216 LBS

## 2019-02-26 DIAGNOSIS — I10 HYPERTENSION GOAL BP (BLOOD PRESSURE) < 140/90: Primary | ICD-10-CM

## 2019-02-26 PROCEDURE — 99213 OFFICE O/P EST LOW 20 MIN: CPT | Performed by: NURSE PRACTITIONER

## 2019-02-26 ASSESSMENT — PAIN SCALES - GENERAL: PAINLEVEL: NO PAIN (0)

## 2019-02-26 ASSESSMENT — MIFFLIN-ST. JEOR: SCORE: 1621.18

## 2019-02-26 NOTE — TELEPHONE ENCOUNTER
Gave patient providers message and scheduled patient for recheck. He wanted to be seen today.    Next 5 appointments (look out 90 days)    Feb 26, 2019  2:40 PM CST  SHORT with LIANE Patel [facility] ([facility]) [address] [facility] [id number] [phone]   Apr 02, 2019 11:00 AM CDT  Return Visit with Catia Loredo MD  [facility] [address] [place] [id number] [phone]        Yulisa Garza RN, BSN

## 2019-02-26 NOTE — PATIENT INSTRUCTIONS
Check your blood pressure once weekly (more often if you have headaches, etc)  Continue to watch your diet and salt intake    Patient Education     Step-by-Step  Checking Your Blood Pressure    Date Last Reviewed: 4/27/2016 2000-2018 [employer] [address] [place] [address], [place] [address]. All rights reserved. This information is not intended as a substitute for professional medical care. Always follow your healthcare professional's instructions.           Patient Education     Taking Your Blood Pressure  Blood pressure is the force of blood against the artery wall as it moves from the heart through the blood vessels. You can take your own blood pressure reading using a digital monitor. Take your readings the same each time, using the same arm. Take readings as often as your healthcare provider instructs.  About blood pressure monitors  Blood pressure monitors are designed for certain ages and cases. You can find monitors for older adults, for pregnant women, and for children. Make sure the one you choose is the right one for your age and situation.  The American Heart Association recommends an automatic cuff monitor that fits on your upper arm (bicep). The cuff should fit your arm size. A cuff that s too large or too small will not give an accurate reading. Measure around your upper arm to find your size.  Monitors that attach to your finger or wrist are not as accurate as monitors for your upper arm.  Ask your healthcare provider for help in choosing a monitor. Bring your monitor to your next provider visit if you need help in using it the correct way.  The steps below are general instructions for using an automatic digital monitor.  Step 1. Relax      Take your blood pressure at the same time every day, such as in the morning or evening, or at the time your healthcare provider recommends.    Wait at least a half-hour after smoking, eating, or exercising. Don't drink coffee, tea, soda, or other  caffeinated beverages before checking your blood pressure.    Sit comfortably at a table with both feet on the floor. Do not cross your legs or feet. Place the monitor near you.    Rest for a few minutes before you begin.  Step 2. Wrap the cuff      Place your arm on the table, palm up. Your arm should be at the level of your heart. Wrap the cuff around your upper arm, just above your elbow. It s best done on bare skin, not over clothing. Most cuffs will indicate where the brachial artery (the blood vessel in the middle of the arm at the inner side of the elbow) should line up with the cuff. Look in your monitor's instruction booklet for an illustration. You can also bring your cuff to your healthcare provider and have them show you how to correctly place the cuff.  Step 3. Inflate the cuff      Push the button that starts the pump.    The cuff will tighten, then loosen.    The numbers will change. When they stop changing, your blood pressure reading will appear.    Take 2 or 3 readings one minute apart.  Step 4. Write down the results of each reading      Write down your blood pressure numbers for each reading. Note the date and time. Keep your results in one place, such as a notebook. Even if your monitor has a built-in memory, keep a hard copy of the readings.    Remove the cuff from your arm. Turn off the machine.    Bring your blood pressure records with your healthcare providers at each visit.    If you start a new blood pressure medicine, note the day you started the new medicine. Also note the day if you change the dose of your medicine. This information goes on your blood pressure recording sheet. This will help your healthcare provider monitor how well the medicine changes are working.    Ask your healthcare provider what numbers should prompt you to call him or her. Also ask what numbers should prompt you to get help right away.  Date Last Reviewed: 11/1/2016 2000-2018 [employer] [address]  [place] [address]. All rights reserved. This information is not intended as a substitute for professional medical care. Always follow your healthcare professional's instructions.

## 2019-02-26 NOTE — PROGRESS NOTES
SUBJECTIVE:   James Lama is a 89 year old male who presents to clinic today for the following health issues:      Hypertension Follow-up      Outpatient blood pressures are being checked at home and store.  Results are 160s-190s/.    Low Salt Diet: low salt      Amount of exercise or physical activity: None    Problems taking medications regularly: No    Medication side effects: none    Diet: regular (no restrictions)      No chest pain, shortness of breath, LE edema, palpitations, headaches.  No changes in diet.   Home BP cuff in clinic read 163/84 and HR 58.    Problem list and histories reviewed & adjusted, as indicated.  Additional history: as documented    Patient Active Problem List   Diagnosis     Prostate cancer (H)     Hyperlipidemia LDL goal <130     Benign hypertension with chronic kidney disease, stage III (H)     KESHA (obstructive sleep apnea)     Gout     Diverticulosis     Perennial allergic rhinitis     GERD (gastroesophageal reflux disease)     Hypothyroidism due to acquired atrophy of thyroid     Erectile dysfunction     Hypertension goal BP (blood pressure) < 140/90     Right inguinal hernia     History of basal cell carcinoma     Advance care planning     Paresthesias/numbness, both feet     Vitamin B12 deficiency (non anemic)     Health Care Home     Actinic keratosis     Obesity, Class I, BMI 30-34.9     Recurrent falls     Cortical cataract of both eyes     Nuclear sclerosis of both eyes     Chronic kidney disease, stage 3 (moderate) (H)     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 (H)     Perennial allergic rhinitis, unspecified allergic rhinitis trigger     Cognitive impairment, mild, so stated     Hyponatremia     Past Surgical History:   Procedure Laterality Date     EXC SKIN MALIG 0.5CM OR LESS,FACIAL  5/09    BCC nasal tip     EXC SKIN MALIG 0.6-1CM FACE,FACIAL  5/09    BCC left ala     EXC SKIN MALIG 1.1-2CM FACE,FACIAL  10/09    BCC forehead     EXC SKIN MALIG 1.1-2CM  TRUNK,ARM,LEG      BCC upper back     HERNIA REPAIR, INGUINAL RT/LT  ~    LT       Social History     Tobacco Use     Smoking status: Never Smoker     Smokeless tobacco: Never Used   Substance Use Topics     Alcohol use: No     Family History   Problem Relation Age of Onset     Cancer Father         , unk type     Myocardial Infarction Sister      Thyroid Disease Sister      Diabetes No family hx of      Hypertension No family hx of      Cerebrovascular Disease No family hx of      Glaucoma No family hx of      Macular Degeneration No family hx of          Current Outpatient Medications   Medication Sig Dispense Refill     acetaminophen (TYLENOL) 500 MG tablet Take 2 tablets by mouth 2 times daily as needed for pain (knee).  0     ASPIRIN 81 MG OR TABS 1 tablet daily*       Cyanocobalamin 1000 MCG TABS 1 tablet daily for foot neuropathy (vitamin B12).       fluticasone (FLONASE) 50 MCG/ACT spray USE ONE TO TWO SPRAY(S) IN EACH NOSTRIL ONCE DAILY FOR  ALLERGY  PREVENTION 1 Bottle 1     indomethacin (INDOCIN) 50 MG capsule Take 1 capsule (50 mg) by mouth 3 times daily (with meals) as needed for gouty attack. 40 capsule 1     levothyroxine (SYNTHROID/LEVOTHROID) 88 MCG tablet Take 1 tablet (88 mcg) by mouth daily for thyroid. 90 tablet 1     lisinopril (PRINIVIL/ZESTRIL) 40 MG tablet Take 1 tablet (40 mg) by mouth daily for blood pressure. 90 tablet 1     lovastatin (MEVACOR) 20 MG tablet Take 1 tablet (20 mg) by mouth every evening for cholesterol. 90 tablet 1     metoprolol succinate ER (TOPROL-XL) 50 MG 24 hr tablet Take 1 tablet (50 mg) by mouth every evening for blood pressure. 90 tablet 1     Multiple Vitamins-Minerals (PRESERVISION AREDS PO) Take 1 capsule by mouth 2 times daily (with meals) (PreserVision AREDS 2)       omeprazole (PRILOSEC) 20 MG CR capsule Take 2 capsules (40 mg) by mouth daily for reflux. Take 30-60 minutes before a meal. 180 capsule 3     polyethylene glycol (MIRALAX) powder Take  "17 g (1 capful) by mouth daily 510 g 1     VITAMIN D, CHOLECALCIFEROL, PO Take 1,000 Units by mouth daily        Allergies   Allergen Reactions     Acrylate Copolymer Rash     topical     BP Readings from Last 3 Encounters:   02/26/19 140/63   02/21/19 138/60   02/15/19 146/72    Wt Readings from Last 3 Encounters:   02/26/19 98 kg (216 lb)   02/21/19 98.4 kg (217 lb)   02/15/19 98.9 kg (218 lb)                    Reviewed and updated as needed this visit by clinical staff  Tobacco  Allergies  Meds  Problems  Med Hx  Surg Hx  Fam Hx  Soc Hx        Reviewed and updated as needed this visit by Provider  Tobacco  Allergies  Meds  Problems  Med Hx  Surg Hx  Fam Hx         ROS:  Constitutional, HEENT, cardiovascular, pulmonary, GI, , musculoskeletal, neuro, skin, endocrine and psych systems are negative, except as otherwise noted.    OBJECTIVE:     /63 (BP Location: Left arm, Patient Position: Chair, Cuff Size: Adult Large)   Pulse 59   Temp 97.5  F (36.4  C) (Oral)   Resp 18   Ht 1.746 m (5' 8.75\")   Wt 98 kg (216 lb)   SpO2 98%   BMI 32.13 kg/m    Body mass index is 32.13 kg/m .  GENERAL: healthy, alert and no distress  RESP: lungs clear to auscultation - no rales, rhonchi or wheezes  CV: regular rate and rhythm, normal S1 S2, no S3 or S4, no murmur, click or rub, no peripheral edema and peripheral pulses strong  MS: no gross musculoskeletal defects noted, no edema  PSYCH: mentation appears normal, affect normal/bright    Diagnostic Test Results:  none     ASSESSMENT/PLAN:     1. Hypertension goal BP (blood pressure) < 140/90  Asymptomatic. Recommend checking once weekly at home. Can come in for ancillary visit for this as well. He thinks he'll come this Friday. He did return demonstration of BP cuff use with his home monitor. Continue current antihypertensive regimen. Recommend follow up with Dr. Jansen in 4 weeks, sooner as needed.       See Patient Instructions    Return precautions " discussed, including when to seek urgent/emergent care.    Patient verbalizes understanding and agrees with plan of care. Patient stable for discharge.      LIANE Galindo CNP  [facility]

## 2019-03-05 ENCOUNTER — OFFICE VISIT (OUTPATIENT)
Dept: FAMILY MEDICINE | Facility: CLINIC | Age: 84
End: 2019-03-05
Payer: COMMERCIAL

## 2019-03-05 VITALS
OXYGEN SATURATION: 98 % | SYSTOLIC BLOOD PRESSURE: 162 MMHG | BODY MASS INDEX: 32.58 KG/M2 | HEIGHT: 69 IN | HEART RATE: 55 BPM | DIASTOLIC BLOOD PRESSURE: 58 MMHG | RESPIRATION RATE: 20 BRPM | WEIGHT: 220 LBS | TEMPERATURE: 97.5 F

## 2019-03-05 DIAGNOSIS — I10 HYPERTENSION GOAL BP (BLOOD PRESSURE) < 140/90: Primary | ICD-10-CM

## 2019-03-05 DIAGNOSIS — G31.84 COGNITIVE IMPAIRMENT, MILD, SO STATED: ICD-10-CM

## 2019-03-05 DIAGNOSIS — D63.1 ANEMIA IN STAGE 3 CHRONIC KIDNEY DISEASE (H): ICD-10-CM

## 2019-03-05 DIAGNOSIS — N18.30 ANEMIA IN STAGE 3 CHRONIC KIDNEY DISEASE (H): ICD-10-CM

## 2019-03-05 PROCEDURE — 93000 ELECTROCARDIOGRAM COMPLETE: CPT | Performed by: FAMILY MEDICINE

## 2019-03-05 PROCEDURE — 99214 OFFICE O/P EST MOD 30 MIN: CPT | Performed by: FAMILY MEDICINE

## 2019-03-05 ASSESSMENT — MIFFLIN-ST. JEOR: SCORE: 1639.32

## 2019-03-05 ASSESSMENT — PAIN SCALES - GENERAL: PAINLEVEL: NO PAIN (0)

## 2019-03-06 NOTE — PROGRESS NOTES
SUBJECTIVE:   James Lama is a 89 year old male who presents to clinic today for the following health issues:    Hypertension Follow-up      Outpatient blood pressures are being checked at home.  Results are 140-210s/70-110s.    Low Salt Diet: low salt- no added salt but has been eating breakfast sausage and other foods high in sodium.     Diuretics recommended in past but patient had to urinate to often and di not like this.           Chronic Kidney Disease Follow-up      Current NSAID use?  No      Problem list and histories reviewed & adjusted, as indicated.  Additional history: as documented    Patient Active Problem List   Diagnosis     Prostate cancer (H)     Hyperlipidemia LDL goal <130     Benign hypertension with chronic kidney disease, stage III (H)     KESHA (obstructive sleep apnea)     Gout     Diverticulosis     Perennial allergic rhinitis     GERD (gastroesophageal reflux disease)     Hypothyroidism due to acquired atrophy of thyroid     Erectile dysfunction     Hypertension goal BP (blood pressure) < 140/90     Right inguinal hernia     History of basal cell carcinoma     Advance care planning     Paresthesias/numbness, both feet     Vitamin B12 deficiency (non anemic)     Health Care Home     Actinic keratosis     Obesity, Class I, BMI 30-34.9     Recurrent falls     Cortical cataract of both eyes     Nuclear sclerosis of both eyes     Chronic kidney disease, stage 3 (moderate) (H)     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 (H)     Perennial allergic rhinitis, unspecified allergic rhinitis trigger     Cognitive impairment, mild, so stated     Hyponatremia     Past Surgical History:   Procedure Laterality Date     EXC SKIN MALIG 0.5CM OR LESS,FACIAL  5/09    BCC nasal tip     EXC SKIN MALIG 0.6-1CM FACE,FACIAL  5/09    BCC left ala     EXC SKIN MALIG 1.1-2CM FACE,FACIAL  10/09    BCC forehead     EXC SKIN MALIG 1.1-2CM TRUNK,ARM,LEG  8/06    BCC upper back     HERNIA REPAIR, INGUINAL RT/LT   ~    LT       Social History     Tobacco Use     Smoking status: Never Smoker     Smokeless tobacco: Never Used   Substance Use Topics     Alcohol use: No     Family History   Problem Relation Age of Onset     Cancer Father         , unk type     Myocardial Infarction Sister      Thyroid Disease Sister      Diabetes No family hx of      Hypertension No family hx of      Cerebrovascular Disease No family hx of      Glaucoma No family hx of      Macular Degeneration No family hx of          Current Outpatient Medications   Medication Sig Dispense Refill     acetaminophen (TYLENOL) 500 MG tablet Take 2 tablets by mouth 2 times daily as needed for pain (knee).  0     ASPIRIN 81 MG OR TABS 1 tablet daily*       Cyanocobalamin 1000 MCG TABS 1 tablet daily for foot neuropathy (vitamin B12).       fluticasone (FLONASE) 50 MCG/ACT spray USE ONE TO TWO SPRAY(S) IN EACH NOSTRIL ONCE DAILY FOR  ALLERGY  PREVENTION 1 Bottle 1     levothyroxine (SYNTHROID/LEVOTHROID) 88 MCG tablet Take 1 tablet (88 mcg) by mouth daily for thyroid. 90 tablet 1     lisinopril (PRINIVIL/ZESTRIL) 40 MG tablet Take 1 tablet (40 mg) by mouth daily for blood pressure. 90 tablet 1     lovastatin (MEVACOR) 20 MG tablet Take 1 tablet (20 mg) by mouth every evening for cholesterol. 90 tablet 1     metoprolol succinate ER (TOPROL-XL) 50 MG 24 hr tablet Take 1 tablet (50 mg) by mouth every evening for blood pressure. 90 tablet 1     Multiple Vitamins-Minerals (PRESERVISION AREDS PO) Take 1 capsule by mouth 2 times daily (with meals) (PreserVision AREDS 2)       omeprazole (PRILOSEC) 20 MG CR capsule Take 2 capsules (40 mg) by mouth daily for reflux. Take 30-60 minutes before a meal. 180 capsule 3     polyethylene glycol (MIRALAX) powder Take 17 g (1 capful) by mouth daily 510 g 1     VITAMIN D, CHOLECALCIFEROL, PO Take 1,000 Units by mouth daily        Allergies   Allergen Reactions     Acrylate Copolymer Rash     topical     Recent Labs   Lab Test  "02/15/19  1439 09/25/18  1139 09/10/18  1640 08/24/18  1034  03/08/18  1203  10/14/15  1419   A1C  --   --   --   --   --   --   --  6.0   LDL 79  --  63  --   --  67   < >  --    HDL 35*  --  45  --   --  38*   < >  --    TRIG 281*  --  272*  --   --  216*   < >  --    ALT 16  --  24  --   --  14   < > 18   CR 1.23  --   --  1.13   < > 1.13   < > 1.39*   GFRESTIMATED 51*  --   --  61   < > 61   < > 48*   GFRESTBLACK 59*  --   --  74   < > 74   < > 58*   POTASSIUM 4.2  --   --  4.4   < > 4.2   < > 4.5   TSH 4.45* 4.32*  --   --   --  3.62   < >  --     < > = values in this interval not displayed.      BP Readings from Last 3 Encounters:   03/05/19 162/58   02/26/19 140/63   02/21/19 138/60    Wt Readings from Last 3 Encounters:   03/05/19 99.8 kg (220 lb)   02/26/19 98 kg (216 lb)   02/21/19 98.4 kg (217 lb)                  Labs reviewed in EPIC    Reviewed and updated as needed this visit by clinical staff  Tobacco  Allergies  Med Hx  Surg Hx  Fam Hx  Soc Hx      Reviewed and updated as needed this visit by Provider         ROS:  Constitutional, HEENT, cardiovascular, pulmonary, gi and gu systems are negative, except as otherwise noted.    OBJECTIVE:     /58 (BP Location: Left arm, Patient Position: Chair, Cuff Size: Adult Large)   Pulse 55   Temp 97.5  F (36.4  C) (Oral)   Resp 20   Ht 1.746 m (5' 8.75\")   Wt 99.8 kg (220 lb)   SpO2 98%   BMI 32.73 kg/m    Body mass index is 32.73 kg/m .  GENERAL: elderly, alert, well nourished, well hydrated, no distress  HENT: ear canals- normal; TMs- normal; Nose- normal; Mouth- no ulcers, no lesions, missing dentition  NECK: no tenderness, no adenopathy, no asymmetry, no masses, no stiffness; thyroid- normal to palpation  RESP: lungs clear to auscultation - no rales, no rhonchi, no wheezes  CV: regular rates and rhythm, normal S1 S2, no S3 or S4 and no murmur, no click or rub, normal pulses  ABDOMEN: soft, no tenderness, no  hepatosplenomegaly, no masses, " "normal bowel sounds  MS: extremities- no gross deformities noted, no edema  SKIN: no suspicious lesions, no rashes, age related skin changes with seborrheic keratosis and no actinic keratosis.    NEURO: strength and tone- decreased, sensory exam- grossly normal, reflexes- symmetric  BACK: no CVA tenderness, no paralumbar tenderness  MENTAL STATUS EXAM:  Appearance/Behavior: no apparent distress, neatly groomed, dressed appropriately for weather, appears stated age and is frail-appearing  Speech: normal  Mood/Affect: normal affect  Insight: Fair     Diagnostic Test Results:  Results for orders placed or performed in visit on 02/21/19   Lipid panel reflex to direct LDL Non-fasting   Result Value Ref Range    Cholesterol 170 <200 mg/dL    Triglycerides 281 (H) <150 mg/dL    HDL Cholesterol 35 (L) >39 mg/dL    LDL Cholesterol Calculated 79 <100 mg/dL    Non HDL Cholesterol 135 (H) <130 mg/dL   ALT   Result Value Ref Range    ALT 16 0 - 70 U/L       ASSESSMENT/PLAN:         Tobacco Cessation:   reports that  has never smoked. he has never used smokeless tobacco.      BMI:   Estimated body mass index is 32.73 kg/m  as calculated from the following:    Height as of this encounter: 1.746 m (5' 8.75\").    Weight as of this encounter: 99.8 kg (220 lb).   Weight management plan: Discussed healthy diet and exercise guidelines        ICD-10-CM    1. Hypertension goal BP (blood pressure) < 140/90 I10 EKG 12-lead complete w/read - Clinics- no signs of cardiac strain. Heart rate low, so do not want to increase beta blocker. Ideally patient should take diuretic. Discussed lower salt diet and higher potassium foods. List of heart healthy foods given to patient.    2. Cognitive impairment, mild, so stated G31.84 Kept instructions simple and patient will work on this. Discussed signs and symptoms of stroke or MI. Follow up with provider and blood pressure check in 1 month   3. 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 (H) N18.3 Stable  "    D63.1        FUTURE LABS:       - Schedule fasting labs in 3 months  FUTURE APPOINTMENTS:       - Follow-up visit in 4 weeks for blood pressure check   Work on weight loss  Regular exercise  See Patient Instructions    Lou Zacarias MD  [facility]

## 2019-03-06 NOTE — PATIENT INSTRUCTIONS
At [facility], we strive to deliver an exceptional experience to you, every time we see you.  If you receive a survey in the mail, please send us back your thoughts. We really do value your feedback.    Based on your medical history, these are the current health maintenance/preventive care services that you are due for (some may have been done at this visit.)  Health Maintenance Due   Topic Date Due     ZOSTER IMMUNIZATION (2 of 3) 06/21/2012     EYE EXAM Q1 YEAR  11/01/2018     MEDICARE ANNUAL WELLNESS VISIT  03/08/2019     FALL RISK ASSESSMENT  03/08/2019         Suggested websites for health information:  Www.[facility].org : Up to date and easily searchable information on multiple topics.  Www.medlineplus.gov : medication info, interactive tutorials, watch real surgeries online  Www.familydoctor.org : good info from the Academy of Family Physicians  Www.cdc.gov : public health info, travel advisories, epidemics (H1N1)  Www.aap.org : children's health info, normal development, vaccinations  Www.health.[facility].[place].us : [place] dept of health, public health issues in [place], N1N1    Your care team:                            Family Medicine Internal Medicine   MD Trino Lombardo MD Shantel Branch-Fleming, MD Katya Georgiev PA-C Nam Ho, MD Pediatrics   WERO Martinez, SAGAR Collins APREVELIA CNP   MD Chelo Ansari MD Deborah Mielke, MD Kim Thein, APRN [facility]      Clinic hours: Monday - Thursday 7 am-7 pm; Fridays 7 am-5 pm.   Urgent care: Monday - Friday 11 am-9 pm; Saturday and Sunday 9 am-5 pm.  Pharmacy : Monday -Thursday 8 am-8 pm; Friday 8 am-6 pm; Saturday and Sunday 9 am-5 pm.     Clinic: [phone]   Pharmacy: [phone]    Patient Education     Eating Heart-Healthy Foods  Eating has a big impact on your heart health. In fact, eating healthier can improve several of your heart risks at once. For instance, it helps you manage  weight, cholesterol, and blood pressure. Here are ideas to help you make heart-healthy changes without giving up all the foods and flavors you love.  Getting started    Talk with your healthcare provider about eating plans, such as the DASH or Mediterranean diet. You may also be referred to a dietitian.    Change a few things at a time. Give yourself time to get used to a few eating changes before adding more.    Work to create a tasty, healthy eating plan that you can stick to for the rest of your life.    Goals for healthy eating  Below are some tips to improve your eating habits:    Limit saturated fats and trans fats. Saturated fats raise your levels of cholesterol, so keep these fats to a minimum. They are found in foods such as fatty meats, whole milk, cheese, and palm and coconut oils. Avoid trans fats because they lower good cholesterol as well as raise bad cholesterol. Trans fats are most often found in processed foods.    Reduce sodium (salt) intake. Eating too much salt may increase your blood pressure. Limit your sodium intake to 2,300 milligrams (mg) per day (the amount in 1 teaspoon of salt), or less if your healthcare provider recommends it. Dining out less often and eating fewer processed foods are two great ways to decrease the amount of salt you consume.    Managing calories. A calorie is a unit of energy. Your body burns calories for fuel, but if you eat more calories than your body burns, the extras are stored as fat. Your healthcare provider can help you create a diet plan to manage your calories. This will likely include eating healthier foods as well as exercising regularly. To help you track your progress, keep a diary to record what you eat and how often you exercise.  Choose the right foods  Aim to make these foods staples of your diet. If you have diabetes, you may have different recommendations than what is listed here:    Fruits and vegetables provide plenty of nutrients without a lot of  calories. At meals, fill half your plate with these foods. Split the other half of your plate between whole grains and lean protein.    Whole grains are high in fiber and rich in vitamins and nutrients. Good choices include whole-wheat bread, pasta, and brown rice.    Lean proteins give you nutrition with less fat. Good choices include fish, skinless chicken, and beans.    Low-fat or nonfat dairy provides nutrients without a lot of fat. Try low-fat or nonfat milk, cheese, or yogurt.    Healthy fats can be good for you in small amounts. These are unsaturated fats, such as olive oil, nuts, and fish. Try to have at least 2 servings per week of fatty fish, such as salmon, sardines, mackerel, rainbow trout, and albacore tuna. These contain omega-3 fatty acids, which are good for your heart. Flaxseed is another source of a heart-healthy fat.  More on heart-healthy eating  Read food labels  Healthy eating starts at the grocery store. Be sure to pay attention to food labels on packaged foods. Look for products that are high in fiber and protein, and low in saturated fat, cholesterol, and sodium. Avoid products that contain trans fat. And pay close attention to serving size. For instance, if you plan to eat two servings, double all the numbers on the label.  Prepare food right  A key part of healthy cooking is cutting down on added fat and salt. Look on the internet for lower-fat, lower-sodium recipes. Also, try these tips:    Remove fat from meat and skin from poultry before cooking.    Skim fat from the surface of soups and sauces.    Broil, boil, bake, steam, grill, and microwave food without added fats.    Choose ingredients that spice up your food without adding calories, fat, or sodium. Try these items: horseradish, hot sauce, lemon, mustard, nonfat salad dressings, and vinegar. For salt-free herbs and spices, try basil, cilantro, cinnamon, pepper, and rosemary.  Date Last Reviewed: 10/1/2017    8265-2611 [facility]  [employer]. [address] [place] [address]. All rights reserved. This information is not intended as a substitute for professional medical care. Always follow your healthcare professional's instructions.           Patient Education     Potassium-Rich Foods  The normal adult diet usually contains 2,000 mg to 4,000 mg of potassium per day. More potassium is needed when you lose too much potassium from your body. This can happen if you have diarrhea or vomiting. It can also happen if you take a medicine to make you urinate more (diuretic). To increase the amount of potassium in your diet, include these high-potassium foods.     [The (*) indicates foods highest in potassium.]  Vegetables  Artichokes. Cooked 1/2 cup, 200 mg to 300 mg*  Asparagus. Cooked 1/2 cup, 200 mg to 300 mg  Beans. White, red, diop cooked 1/2 cup, 300 mg to 500 mg*  Beets. Cooked 1/2 cup, 200 mg to 300 mg  Broccoli. Cooked or raw 1 cup, 200 mg to 500 mg*  [place] sprouts. Cooked 1/2 cup, 200 mg to 300 mg  Cabbage. Raw 1 cup, 100 mg to 200 mg  Carrots. Raw or cooked 1/2 cup, 100 mg to 200 mg  Celery. Raw 1 cup, 200 mg to 300 mg  Lima beans. Fresh or frozen 1/2 cup, 300 mg to 500 mg*   Mushrooms. Raw or cooked 1/2 cup, 100 mg to 300 mg  Peas. Cooked 1/2 cup, 150 mg to 250 mg   Potatoes. Baked 1 medium, 500 mg to 900 mg*   Spinach. Cooked 1 cup, 800 mg to 900 mg*   Spinach. Raw 2 cups, 300 mg to 400 mg *  Squash, winter. Fresh, frozen, or cooked 1/2 cup, 200 mg to 400 mg   Tomato. Fresh 1 medium, 200 mg to 300 mg   Tomato juice. Canned 1/2 cup, 200 mg to 300 mg   Fruits  Apple juice. Unsweetened 1 cup, 200 mg to 300 mg   Apricots. Canned 1/2 cup, 200 mg to 300 mg   Apricots. Dried 4 pieces, 100 mg to 200 mg   Avocado. Raw 1/2 cup, 300 mg to 400 mg*  Banana. Fresh 1 small, 300 mg to 400 mg*   Cantaloupe. Fresh 1 cup diced, 300 mg to 400 mg*   Grape juice. Unsweetened 1 cup, 200 mg to 300 mg   Honeydew melon. Fresh 1 cup diced, 300 mg to  400 mg*   Orange. Fresh 1 medium, 200 mg to 300 mg    Orange juice. Unsweetened, fresh or frozen 1/2 cup, 200 mg to 300 mg  Pineapple juice. Unsweetened 1 cup, 300 mg to 400 mg   Prune juice. Unsweetened 1/2 cup, 300 mg to 400 mg*   Prunes. Dried 5 pieces, 300 mg to 400 mg*   Strawberries. Fresh or frozen 1 cup, 200 mg to 300 mg  Meat  Red meat. Cooked 3 ounces, 100 mg to 300 mg   Seafood  Cod, flounder, halibut. Cooked 3 ounces, 100 mg to 300 mg*  [place]. Cooked, 3 ounces 300 mg to 400 mg*   Scallops. Cooked 3 ounces, 200 mg to 300 mg*  Shrimp. Cooked 3/4 cup, 100 mg to 200 mg   Tuna. Fresh or canned 3/4 cup, 200 mg to 500 mg   Date Last Reviewed: 10/1/2016    9594-0296 [employer]. [address] [place] [address], [place] [address]. All rights reserved. This information is not intended as a substitute for professional medical care. Always follow your healthcare professional's instructions.

## 2019-03-14 ENCOUNTER — OFFICE VISIT (OUTPATIENT)
Dept: DERMATOLOGY | Facility: CLINIC | Age: 84
End: 2019-03-14
Payer: COMMERCIAL

## 2019-03-14 DIAGNOSIS — L82.1 SEBORRHEIC KERATOSIS: ICD-10-CM

## 2019-03-14 DIAGNOSIS — L81.4 SOLAR LENTIGO: ICD-10-CM

## 2019-03-14 DIAGNOSIS — L70.0 COMEDONE: ICD-10-CM

## 2019-03-14 DIAGNOSIS — R23.8 VENOUS LAKE: ICD-10-CM

## 2019-03-14 DIAGNOSIS — Z86.018 HISTORY OF DYSPLASTIC NEVUS: ICD-10-CM

## 2019-03-14 DIAGNOSIS — L57.8 SUN-DAMAGED SKIN: ICD-10-CM

## 2019-03-14 DIAGNOSIS — Z85.828 HISTORY OF NONMELANOMA SKIN CANCER: Primary | ICD-10-CM

## 2019-03-14 DIAGNOSIS — L57.0 ACTINIC KERATOSIS: ICD-10-CM

## 2019-03-14 DIAGNOSIS — D22.9 MULTIPLE BENIGN NEVI: ICD-10-CM

## 2019-03-14 PROCEDURE — 17000 DESTRUCT PREMALG LESION: CPT | Performed by: DERMATOLOGY

## 2019-03-14 PROCEDURE — 99214 OFFICE O/P EST MOD 30 MIN: CPT | Mod: 25 | Performed by: DERMATOLOGY

## 2019-03-14 PROCEDURE — 17003 DESTRUCT PREMALG LES 2-14: CPT | Performed by: DERMATOLOGY

## 2019-03-14 ASSESSMENT — PAIN SCALES - GENERAL: PAINLEVEL: NO PAIN (0)

## 2019-03-14 NOTE — PROGRESS NOTES
[facility] Dermatology Note      Dermatology Problem List:  1. 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 Hx of DN   -Junctional dysplastic nevus with mild atypia, left lower back, s/p 2/14/2017   3. Actinic keratosis  -s/p cryotherapy     Encounter Date: Mar 14, 2019    CC:  Chief Complaint   Patient presents with     Skin Check     Spot of concern on left ear         History of Present Illness:  Mr. James Lama is a 89 year old male with a history of NMSC and dysplastic nevus who presents as a follow up for skin check. The patient is present with his wife today. The patient was last seen by Dr. Loredo 3/9/2018. Today, he has a spot of concern on his ear. It scabs over and falls off. It is not tender. It has never bled. No other concerns addressed today.         Past Medical History:   Patient Active Problem List   Diagnosis     Prostate cancer (H)     Hyperlipidemia LDL goal <130     Benign hypertension with chronic kidney disease, stage III (H)     KESHA (obstructive sleep apnea)     Gout     Diverticulosis     Perennial allergic rhinitis     GERD (gastroesophageal reflux disease)     Hypothyroidism due to acquired atrophy of thyroid     Erectile dysfunction     Hypertension goal BP (blood pressure) < 140/90     Right inguinal hernia     History of basal cell carcinoma     Advance care planning     Paresthesias/numbness, both feet     Vitamin B12 deficiency (non anemic)     Health Care Home     Actinic keratosis     Obesity, Class I, BMI 30-34.9     Recurrent falls     Cortical cataract of both eyes     Nuclear sclerosis of both eyes     Chronic kidney disease, stage 3 (moderate) (H)     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 (H)     Perennial allergic rhinitis, unspecified allergic rhinitis trigger     Cognitive impairment, mild, so stated     Hyponatremia     Past Medical History:   Diagnosis Date     Actinic  keratosis      Arthritis      Basal cell carcinoma Pre-2009    nose, forehead, back     BPH      Diverticulosis 1/02     Erectile dysfunction      Essential hypertension, benign      Gout      Hyperlipidemia LDL goal <130      Hypothyroidism      KESHA (obstructive sleep apnoea)      Perennial allergic rhinitis     dust, mold     Prostate cancer (H) 1/04    s/p XRT 2005, Dr. 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  0     ASPIRIN 81 MG OR TABS 1 tablet daily*       Cyanocobalamin 1000 MCG TABS 1 tablet daily for foot neuropathy (vitamin B12).       fluticasone (FLONASE) 50 MCG/ACT spray USE ONE TO TWO SPRAY(S) IN EACH NOSTRIL ONCE DAILY FOR  ALLERGY  PREVENTION 1 Bottle 1     levothyroxine (SYNTHROID/LEVOTHROID) 88 MCG tablet Take 1 tablet (88 mcg) by mouth daily for thyroid. 90 tablet 1     lisinopril (PRINIVIL/ZESTRIL) 40 MG tablet Take 1 tablet (40 mg) by mouth daily for blood pressure. 90 tablet 1     lovastatin (MEVACOR) 20 MG tablet Take 1 tablet (20 mg) by mouth every evening for cholesterol. 90 tablet 1     metoprolol succinate ER (TOPROL-XL) 50 MG 24 hr tablet Take 1 tablet (50 mg) by mouth every evening for blood pressure. 90 tablet 1     Multiple Vitamins-Minerals (PRESERVISION AREDS PO) Take 1  capsule by mouth 2 times daily (with meals) (PreserVision AREDS 2)       omeprazole (PRILOSEC) 20 MG CR capsule Take 2 capsules (40 mg) by mouth daily for reflux. Take 30-60 minutes before a meal. 180 capsule 3     polyethylene glycol (MIRALAX) powder Take 17 g (1 capful) by mouth daily 510 g 1     VITAMIN D, CHOLECALCIFEROL, PO Take 1,000 Units by mouth daily          Allergies   Allergen Reactions     Acrylate Copolymer Rash     topical     Review of Systems:  -Constitutional: Patient is otherwise feeling well, in usual state of health.   -Skin: As above in HPI. No additional skin concerns.    Physical exam:  Vitals: There were no vitals taken for this visit.  GEN: This is a well developed, well-nourished male in no acute distress, in a pleasant mood.    SKIN: Total skin excluding the undergarment areas was performed. The exam included the head/face, neck, both arms, chest, back, abdomen, both legs, digits and/or nails and buttocks.    - Virgen Type II  - Scattered brown macules on sun exposed areas.  - Well healed scar on the upper back   - Well healed linear scar at the left lateral neck  - Well healed scar on the nose.  - Gritty pink papule on the left helix, frontal hairline, right forehead, right cheek, nose  - Venous lake on the central lower lip  - slightly blue-lindsey cystic subcutaneous papule on the left medial lower lash line  - scattered open comedones scattered on the face  - Multiple regular brown pigmented macules and papules are identified on the trunk and extremities.   - There are waxy stuck on tan to brown papules on the trunk, and extremities.   - No other lesions of concern on areas examined.     Impression/Plan:  1. History of nonmelanoma skin cancer, no clincial evidence of recurrence:    Recommend sunscreens SPF #30 or greater, protective clothing and avoidance of tanning beds.    2. History of dysplastic nevus.     Recommended yearly skin exams.     3. Sun damaged skin with solar  lentigines    Recommend sunscreens SPF #30 or greater, protective clothing and avoidance of tanning beds.    4. Benign findings included: seborrheic keratoses, venous lake, comedones    No further intervention required. Patient to report changes.     Patient reassured of the benign nature of these lesions.    5. Multiple benign nevi    No further intervention required. Patient to report changes.     Patient reassured of the benign nature of these lesions.    6. Actinic keratosis. Discussed precancerous nature of these lesions. Recommended treatment to prevent progression to a squamous cell carcinoma.   Cryotherapy procedure note: After verbal consent and discussion of risks and benefits including but no limited to dyspigmentation/scar, blister, and pain, 5 was(were) treated with 1-2mm freeze border for 2 cycles with liquid nitrogen. Post cryotherapy instructions were provided.     Follow up in 1 year, earlier for new or changing lesions.     Staff Involved:  Scribe/Staff    Scribe Disclosure  I, Holly Dasilva, am serving as a scribe to document services personally performed by Dr. Yvonne Murry MD, based on data collection and the provider's statements to me.     Provider Disclosure:   The documentation recorded by the scribe accurately reflects the services I personally performed and the decisions made by me.    Yvonne Murry MD    Department of Dermatology  [facility]: Phone: [phone], Fax:[phone]  [facility]: Phone: [phone], Fax: [phone]

## 2019-03-14 NOTE — PATIENT INSTRUCTIONS
Cryotherapy    What is it?    Use of a very cold liquid, such as liquid nitrogen, to freeze and destroy abnormal skin cells that need to be removed    What should I expect?    Tenderness and redness    A small blister that might grow and fill with dark purple blood. There may be crusting.    More than one treatment may be needed if the lesions do not go away.    How do I care for the treated area?    Gently wash the area with your hands when bathing.    Use a thin layer of Vaseline to help with healing. You may use a Band-Aid.     The area should heal within 7-10 days and may leave behind a pink or lighter color.     Do not use an antibiotic or Neosporin ointment.     You may take acetaminophen (Tylenol) for pain.     Call your Doctor if you have:    Severe pain    Signs of infection (warmth, redness, cloudy yellow drainage, and or a bad smell)    Questions or concerns    Who should I call with questions?       [facility]: [phone]       [facility]: [phone]       For urgent needs outside of business hours call the [facility] at [phone]        and ask for the dermatology resident on call

## 2019-03-14 NOTE — NURSING NOTE
James Lama's goals for this visit include:   Chief Complaint   Patient presents with     Skin Check     Spot of concern on left ear       He requests these members of his care team be copied on today's visit information: PCP    PCP: Juan Carlos Jansen    Referring Provider:  No referring provider defined for this encounter.    There were no vitals taken for this visit.    Do you need any medication refills at today's visit? None      Cinthya Mejia CMA

## 2019-03-14 NOTE — LETTER
3/14/2019         RE: James Lama  [address]  [place] [address]        Dear Colleague,    Thank you for referring your patient, James Lama, to the [facility]. Please see a copy of my visit note below.    [facility] Dermatology Note      Dermatology Problem List:  1. 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 Hx of DN   -Junctional dysplastic nevus with mild atypia, left lower back, s/p 2/14/2017   3. Actinic keratosis  -s/p cryotherapy     Encounter Date: Mar 14, 2019    CC:  Chief Complaint   Patient presents with     Skin Check     Spot of concern on left ear         History of Present Illness:  Mr. James Lama is a 89 year old male with a history of NMSC and dysplastic nevus who presents as a follow up for skin check. The patient is present with his wife today. The patient was last seen by Dr. Loredo 3/9/2018. Today, he has a spot of concern on his ear. It scabs over and falls off. It is not tender. It has never bled. No other concerns addressed today.         Past Medical History:   Patient Active Problem List   Diagnosis     Prostate cancer (H)     Hyperlipidemia LDL goal <130     Benign hypertension with chronic kidney disease, stage III (H)     KESHA (obstructive sleep apnea)     Gout     Diverticulosis     Perennial allergic rhinitis     GERD (gastroesophageal reflux disease)     Hypothyroidism due to acquired atrophy of thyroid     Erectile dysfunction     Hypertension goal BP (blood pressure) < 140/90     Right inguinal hernia     History of basal cell carcinoma     Advance care planning     Paresthesias/numbness, both feet     Vitamin B12 deficiency (non anemic)     Health Care Home     Actinic keratosis     Obesity, Class I, BMI 30-34.9     Recurrent falls     Cortical cataract of both eyes     Nuclear sclerosis of both eyes     Chronic kidney disease,  stage 3 (moderate) (H)     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 (H)     Perennial allergic rhinitis, unspecified allergic rhinitis trigger     Cognitive impairment, mild, so stated     Hyponatremia     Past Medical History:   Diagnosis Date     Actinic keratosis      Arthritis      Basal cell carcinoma Pre-2009    nose, forehead, back     BPH      Diverticulosis 1/02     Erectile dysfunction      Essential hypertension, benign      Gout      Hyperlipidemia LDL goal <130      Hypothyroidism      KESHA (obstructive sleep apnoea)      Perennial allergic rhinitis     dust, mold     Prostate cancer (H) 1/04    s/p XRT 2005, Dr. 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  0     ASPIRIN 81 MG OR TABS 1 tablet daily*       Cyanocobalamin 1000 MCG TABS 1 tablet daily for foot neuropathy (vitamin B12).       fluticasone (FLONASE) 50 MCG/ACT spray USE ONE TO TWO SPRAY(S) IN EACH NOSTRIL ONCE DAILY FOR  ALLERGY  PREVENTION 1 Bottle 1     levothyroxine (SYNTHROID/LEVOTHROID) 88 MCG tablet Take 1 tablet (88 mcg) by mouth daily for thyroid. 90 tablet 1     lisinopril (PRINIVIL/ZESTRIL) 40 MG tablet Take 1 tablet (40 mg) by mouth daily for blood pressure. 90 tablet 1     lovastatin (MEVACOR) 20 MG tablet Take 1 tablet (20  mg) by mouth every evening for cholesterol. 90 tablet 1     metoprolol succinate ER (TOPROL-XL) 50 MG 24 hr tablet Take 1 tablet (50 mg) by mouth every evening for blood pressure. 90 tablet 1     Multiple Vitamins-Minerals (PRESERVISION AREDS PO) Take 1 capsule by mouth 2 times daily (with meals) (PreserVision AREDS 2)       omeprazole (PRILOSEC) 20 MG CR capsule Take 2 capsules (40 mg) by mouth daily for reflux. Take 30-60 minutes before a meal. 180 capsule 3     polyethylene glycol (MIRALAX) powder Take 17 g (1 capful) by mouth daily 510 g 1     VITAMIN D, CHOLECALCIFEROL, PO Take 1,000 Units by mouth daily          Allergies   Allergen Reactions     Acrylate Copolymer Rash     topical     Review of Systems:  -Constitutional: Patient is otherwise feeling well, in usual state of health.   -Skin: As above in HPI. No additional skin concerns.    Physical exam:  Vitals: There were no vitals taken for this visit.  GEN: This is a well developed, well-nourished male in no acute distress, in a pleasant mood.    SKIN: Total skin excluding the undergarment areas was performed. The exam included the head/face, neck, both arms, chest, back, abdomen, both legs, digits and/or nails and buttocks.    - Virgen Type II  - Scattered brown macules on sun exposed areas.  - Well healed scar on the upper back   - Well healed linear scar at the left lateral neck  - Well healed scar on the nose.  - Gritty pink papule on the left helix, frontal hairline, right forehead, right cheek, nose  - Venous lake on the central lower lip  - slightly blue-lindsey cystic subcutaneous papule on the left medial lower lash line  - scattered open comedones scattered on the face  - Multiple regular brown pigmented macules and papules are identified on the trunk and extremities.   - There are waxy stuck on tan to brown papules on the trunk, and extremities.   - No other lesions of concern on areas examined.     Impression/Plan:  1. History of nonmelanoma  skin cancer, no clincial evidence of recurrence:    Recommend sunscreens SPF #30 or greater, protective clothing and avoidance of tanning beds.    2. History of dysplastic nevus.     Recommended yearly skin exams.     3. Sun damaged skin with solar lentigines    Recommend sunscreens SPF #30 or greater, protective clothing and avoidance of tanning beds.    4. Benign findings included: seborrheic keratoses, venous lake, comedones    No further intervention required. Patient to report changes.     Patient reassured of the benign nature of these lesions.    5. Multiple benign nevi    No further intervention required. Patient to report changes.     Patient reassured of the benign nature of these lesions.    6. Actinic keratosis. Discussed precancerous nature of these lesions. Recommended treatment to prevent progression to a squamous cell carcinoma.   Cryotherapy procedure note: After verbal consent and discussion of risks and benefits including but no limited to dyspigmentation/scar, blister, and pain, 5 was(were) treated with 1-2mm freeze border for 2 cycles with liquid nitrogen. Post cryotherapy instructions were provided.     Follow up in 1 year, earlier for new or changing lesions.     Staff Involved:  Scribe/Staff    Scribe Disclosure  I, Holly Dasilva, am serving as a scribe to document services personally performed by Dr. Yvonne Murry MD, based on data collection and the provider's statements to me.     Provider Disclosure:   The documentation recorded by the scribe accurately reflects the services I personally performed and the decisions made by me.    Yvonne Murry MD    Department of Dermatology  [facility]: Phone: [phone], Fax:[phone]  [facility]: Phone: [phone], Fax: [phone]              Again, thank you for allowing me to participate in the care of your  patient.        Sincerely,        Yvonne Murry MD

## 2019-04-02 ENCOUNTER — TRANSFERRED RECORDS (OUTPATIENT)
Dept: HEALTH INFORMATION MANAGEMENT | Facility: CLINIC | Age: 84
End: 2019-04-02

## 2019-06-14 ENCOUNTER — OFFICE VISIT (OUTPATIENT)
Dept: FAMILY MEDICINE | Facility: CLINIC | Age: 84
End: 2019-06-14
Payer: COMMERCIAL

## 2019-06-14 VITALS
OXYGEN SATURATION: 98 % | HEIGHT: 69 IN | WEIGHT: 208.4 LBS | HEART RATE: 71 BPM | BODY MASS INDEX: 30.87 KG/M2 | DIASTOLIC BLOOD PRESSURE: 60 MMHG | SYSTOLIC BLOOD PRESSURE: 130 MMHG | TEMPERATURE: 98.9 F

## 2019-06-14 DIAGNOSIS — J06.9 VIRAL UPPER RESPIRATORY TRACT INFECTION: Primary | ICD-10-CM

## 2019-06-14 DIAGNOSIS — N18.30 CHRONIC KIDNEY DISEASE, STAGE 3 (MODERATE): ICD-10-CM

## 2019-06-14 DIAGNOSIS — I10 HYPERTENSION GOAL BP (BLOOD PRESSURE) < 140/90: ICD-10-CM

## 2019-06-14 DIAGNOSIS — E66.811 OBESITY, CLASS I, BMI 30-34.9: ICD-10-CM

## 2019-06-14 PROCEDURE — 99214 OFFICE O/P EST MOD 30 MIN: CPT | Performed by: NURSE PRACTITIONER

## 2019-06-14 ASSESSMENT — MIFFLIN-ST. JEOR: SCORE: 1586.71

## 2019-06-14 NOTE — PATIENT INSTRUCTIONS
At [facility], we strive to deliver an exceptional experience to you, every time we see you.  If you receive a survey in the mail, please send us back your thoughts. We really do value your feedback.    Your care team:                            Family Medicine Internal Medicine   MD Trino Lombardo MD Shantel Branch-Fleming, MD Katya Georgiev PA-C Megan Hill, APRN CNP    Sergo Rolon MD Pediatrics   Yimi Post, WERO Schwarz, MD Yolie Pagan APRN CNP   MD Chelo Ansari MD Deborah Mielke, MD Tricia Walker, APRN [facility]      Clinic hours: Monday - Thursday 7 am-7 pm; Fridays 7 am-5 pm.   Urgent care: Monday - Friday 11 am-9 pm; Saturday and Sunday 9 am-5 pm.  Pharmacy : Monday -Thursday 8 am-8 pm; Friday 8 am-6 pm; Saturday and Sunday 9 am-5 pm.     Clinic: [phone]   Pharmacy: [phone]        Patient Education     Viral Upper Respiratory Illness (Adult)    You have a viral upper respiratory illness (URI), which is another term for the common cold. This illness is contagious during the first few days. It is spread through the air by coughing and sneezing. It may also be spread by direct contact (touching the sick person and then touching your own eyes, nose, or mouth). Frequent handwashing will decrease risk of spread. Most viral illnesses go away within 7 to 10 days with rest and simple home remedies. Sometimes the illness may last for several weeks. Antibiotics will not kill a virus, and they are generally not prescribed for this condition.  Home care    If symptoms are severe, rest at home for the first 2 to 3 days. When you resume activity, don't let yourself get too tired.    Don't smoke. If you need help stopping, talk with your healthcare provider.    Avoid being exposed to cigarette smoke (yours or others ).    You may use acetaminophen or ibuprofen to control pain and fever, unless another medicine was  prescribed. If you have chronic liver or kidney disease, have ever had a stomach ulcer or gastrointestinal bleeding, or are taking blood-thinning medicines, talk with your healthcare provider before using these medicines. Aspirin should never be given to anyone under 18 years of age who is ill with a viral infection or fever. It may cause severe liver or brain damage.    Your appetite may be poor, so a light diet is fine. Stay well hydrated by drinking 6 to 8 glasses of fluids per day (water, soft drinks, juices, tea, or soup). Extra fluids will help loosen secretions in the nose and lungs.    Over-the-counter cold medicines will not shorten the length of time you re sick, but they may be helpful for the following symptoms: cough, sore throat, and nasal and sinus congestion. If you take prescription medicines, ask your healthcare provider or pharmacist which over-the-counter medicines are safe to use. (Note: Don't use decongestants if you have high blood pressure.)  Follow-up care  Follow up with your healthcare provider, or as advised.  When to seek medical advice  Call your healthcare provider right away if any of these occur:    Cough with lots of colored sputum (mucus)    Severe headache; face, neck, or ear pain    Difficulty swallowing due to throat pain    Fever of 100.4 F (38 C) or higher, or as directed by your healthcare provider  Call 911  Call 911 if any of these occur:    Chest pain, shortness of breath, wheezing, or difficulty breathing    Coughing up blood    Very severe pain with swallowing, especially if it goes along with a muffled voice   Date Last Reviewed: 6/1/2018 2000-2018 [employer]. [address] [place] [address], [place] [address]. All rights reserved. This information is not intended as a substitute for professional medical care. Always follow your healthcare professional's instructions.

## 2019-06-14 NOTE — PROGRESS NOTES
Subjective     James Lama is a 89 year old male who presents to clinic today for the following health issues:    HPI   RESPIRATORY SYMPTOMS      Duration: Since before Monday    Description  nasal congestion, rhinorrhea, cough,subjctive  fever, chills, fatigue/malaise and stomach ache- swallowing nasal congestion    Severity: moderate    Accompanying signs and symptoms: None    History (predisposing factors):  none    Precipitating or alleviating factors: None    Therapies tried and outcome:  Robitussin and tussin cough    Hypertension Follow-up      Do you check your blood pressure regularly outside of the clinic? Yes     Are you following a low salt diet? Yes    Are your blood pressures ever more than 140 on the top number (systolic) OR more   than 90 on the bottom number (diastolic), for example 140/90? No    Patient Active Problem List   Diagnosis     Prostate cancer (H)     Hyperlipidemia LDL goal <130     Benign hypertension with chronic kidney disease, stage III (H)     KESHA (obstructive sleep apnea)     Gout     Diverticulosis     Perennial allergic rhinitis     GERD (gastroesophageal reflux disease)     Hypothyroidism due to acquired atrophy of thyroid     Erectile dysfunction     Hypertension goal BP (blood pressure) < 140/90     Right inguinal hernia     History of basal cell carcinoma     Advance care planning     Paresthesias/numbness, both feet     Vitamin B12 deficiency (non anemic)     Health Care Home     Actinic keratosis     Obesity, Class I, BMI 30-34.9     Recurrent falls     Cortical cataract of both eyes     Nuclear sclerosis of both eyes     Chronic kidney disease, stage 3 (moderate) (H)     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 (H)     Perennial allergic rhinitis, unspecified allergic rhinitis trigger     Cognitive impairment, mild, so stated     Hyponatremia     Past Surgical History:   Procedure Laterality Date     EXC SKIN MALIG 0.5CM OR LESS,FACIAL  5/09    BCC nasal tip     EXC  SKIN MALIG 0.6-1CM FACE,FACIAL      BCC left ala     EXC SKIN MALIG 1.1-2CM FACE,FACIAL  10/09    BCC forehead     EXC SKIN MALIG 1.1-2CM TRUNK,ARM,LEG      BCC upper back     HERNIA REPAIR, INGUINAL RT/LT  ~    LT       Social History     Tobacco Use     Smoking status: Never Smoker     Smokeless tobacco: Never Used   Substance Use Topics     Alcohol use: No     Family History   Problem Relation Age of Onset     Cancer Father         , unk type     Myocardial Infarction Sister      Thyroid Disease Sister      Diabetes No family hx of      Hypertension No family hx of      Cerebrovascular Disease No family hx of      Glaucoma No family hx of      Macular Degeneration No family hx of          Current Outpatient Medications   Medication Sig Dispense Refill     acetaminophen (TYLENOL) 500 MG tablet Take 2 tablets by mouth 2 times daily as needed for pain (knee).  0     ASPIRIN 81 MG OR TABS 1 tablet daily*       Cyanocobalamin 1000 MCG TABS 1 tablet daily for foot neuropathy (vitamin B12).       fluticasone (FLONASE) 50 MCG/ACT spray USE ONE TO TWO SPRAY(S) IN EACH NOSTRIL ONCE DAILY FOR  ALLERGY  PREVENTION 1 Bottle 1     levothyroxine (SYNTHROID/LEVOTHROID) 88 MCG tablet Take 1 tablet (88 mcg) by mouth daily for thyroid. 90 tablet 1     lisinopril (PRINIVIL/ZESTRIL) 40 MG tablet Take 1 tablet (40 mg) by mouth daily for blood pressure. 90 tablet 1     lovastatin (MEVACOR) 20 MG tablet Take 1 tablet (20 mg) by mouth every evening for cholesterol. 90 tablet 1     metoprolol succinate ER (TOPROL-XL) 50 MG 24 hr tablet Take 1 tablet (50 mg) by mouth every evening for blood pressure. 90 tablet 1     Multiple Vitamins-Minerals (PRESERVISION AREDS PO) Take 1 capsule by mouth 2 times daily (with meals) (PreserVision AREDS 2)       omeprazole (PRILOSEC) 20 MG CR capsule Take 2 capsules (40 mg) by mouth daily for reflux. Take 30-60 minutes before a meal. 180 capsule 3     polyethylene glycol (MIRALAX) powder  "Take 17 g (1 capful) by mouth daily 510 g 1     VITAMIN D, CHOLECALCIFEROL, PO Take 1,000 Units by mouth daily        BP Readings from Last 3 Encounters:   06/14/19 130/60   03/05/19 162/58   02/26/19 140/63    Wt Readings from Last 3 Encounters:   06/14/19 94.5 kg (208 lb 6.4 oz)   03/05/19 99.8 kg (220 lb)   02/26/19 98 kg (216 lb)             Reviewed and updated as needed this visit by Provider         Review of Systems   ROS COMP: Constitutional, HEENT, cardiovascular, pulmonary, gi and gu systems are negative, except as otherwise noted.      Objective    /81   Pulse 71   Temp 98.9  F (37.2  C) (Oral)   Ht 1.746 m (5' 8.75\")   Wt 94.5 kg (208 lb 6.4 oz)   SpO2 98%   BMI 31.00 kg/m    Body mass index is 31 kg/m .  Physical Exam   GENERAL: healthy, alert and no distress  EYES: Eyes grossly normal to inspection, PERRL and conjunctivae and sclerae normal  HENT: ear canals and TM's normal, nose and mouth without ulcers or lesions  NECK: no adenopathy, no asymmetry, masses, or scars and thyroid normal to palpation  RESP: lungs clear to auscultation - no rales, rhonchi or wheezes  CV: regular rate and rhythm, normal S1 S2, no S3 or S4, no murmur, click or rub, no peripheral edema and peripheral pulses strong  ABDOMEN: soft, nontender, no hepatosplenomegaly, no masses and bowel sounds normal  MS: no gross musculoskeletal defects noted, no edema  SKIN: no suspicious lesions or rashes  NEURO: Normal strength and tone, mentation intact and speech normal  BACK: no CVA tenderness, no paralumbar tenderness  PSYCH: mentation appears normal, affect normal/bright  LYMPH: normal ant/post cervical, supraclavicular nodes    Diagnostic Test Results:  Labs reviewed in Epic        Assessment & Plan     1. Viral upper respiratory tract infection  Lungs are clear, reassured patient.  Puush fluids, rest, symptomatic treatment as needed- humidified air, nasal saline.  Reviewed indications for return to clinic/ " "visit.      2. Hypertension goal BP (blood pressure) < 140/90  BP elevated today but he's been coughing.No issues with medication compliance    3. Chronic kidney disease, stage 3 (moderate) (H)  Avoid decongestants    4. Obesity, Class I, BMI 30-34.9  Benefits of weight loss reviewed in detail, encouraged him to cut back on the carbohydrates in the diet, consume more fruits and vegetables, drink plenty of water, avoid fruit juices, sodas, exercise daily.  Recheck weight in 6 months.         BMI:   Estimated body mass index is 31 kg/m  as calculated from the following:    Height as of this encounter: 1.746 m (5' 8.75\").    Weight as of this encounter: 94.5 kg (208 lb 6.4 oz).   Weight management plan: Discussed healthy diet and exercise guidelines        See Patient Instructions    Return in about 7 years (around 6/14/2026), or if symptoms worsen or fail to improve, for Routine Visit.    LIANE Daniel [facility]      "

## 2019-09-02 ENCOUNTER — TELEPHONE (OUTPATIENT)
Dept: FAMILY MEDICINE | Facility: CLINIC | Age: 84
End: 2019-09-02

## 2019-09-02 NOTE — TELEPHONE ENCOUNTER
Reason for call:  Other   Patient called regarding (reason for call): call back  Additional comments: in regards to back pain    Phone number to reach patient:  Home number on file [phone] (home)    Best Time:  anytime    Can we leave a detailed message on this number?  Not Applicable

## 2019-09-03 NOTE — TELEPHONE ENCOUNTER
This writer attempted to contact Conor on 09/03/19      Reason for call triage back pain and left message.      If patient calls back:   Registered Nurse called. Follow Triage Call workflow        Cyndi Beyer RN

## 2019-09-04 NOTE — TELEPHONE ENCOUNTER
"Spoke with patient on the phone and he reports low mid back pain. The pain is rated at a 3 with activity. At rest he mentions that he does not experience back pain. The pain radiates down his legs into his knees and hips. The pain started \"a long time ago\", he thinks months ago. He is able to ambulate and get around as needed.     He denies following symptoms: loss of bladder/bowel, changes with urination pattern, inability to ambulate.     After triage this RN determined he can wait for his scheduled for appointment. Writer did check and Monday is the first available day patient can get in with chosen provider.    Next 5 appointments (look out 90 days)    Sep 09, 2019  1:40 PM CDT  [facility] [address] [place] [id number] [phone]        "

## 2019-09-06 DIAGNOSIS — N18.30 BENIGN HYPERTENSION WITH CHRONIC KIDNEY DISEASE, STAGE III (H): ICD-10-CM

## 2019-09-06 DIAGNOSIS — I12.9 BENIGN HYPERTENSION WITH CHRONIC KIDNEY DISEASE, STAGE III (H): ICD-10-CM

## 2019-09-06 NOTE — TELEPHONE ENCOUNTER
"Requested Prescriptions   Pending Prescriptions Disp Refills     lisinopril (PRINIVIL/ZESTRIL) 40 MG tablet [Pharmacy Med Name: LISINOPRIL 40MG     TAB]  Last Written Prescription Date:  02/21/19  Last Fill Quantity: 90,  # refills: 1   Last Office Visit with [facility] or [facility] prescribing provider:  06/14/19-Thein   Future Office Visit:    Next 5 appointments (look out 90 days)    Sep 09, 2019  1:40 PM CDT  SHORT with Juan Carlos Jansen MD  [facility] [address] [place] [id number]  [phone]        90 tablet 1     Sig: TAKE 1 TABLET BY MOUTH ONCE DAILY FOR BLOOD PRESSURE       ACE Inhibitors (Including Combos) Protocol Passed - 9/6/2019  1:47 PM        Passed - Blood pressure under 140/90 in past 12 months     BP Readings from Last 3 Encounters:   06/14/19 130/60   03/05/19 162/58   02/26/19 140/63                 Passed - Recent (12 mo) or future (30 days) visit within the authorizing provider's specialty     Patient had office visit in the last 12 months or has a visit in the next 30 days with authorizing provider or within the authorizing provider's specialty.  See \"Patient Info\" tab in inbasket, or \"Choose Columns\" in Meds & Orders section of the refill encounter.              Passed - Medication is active on med list        Passed - Patient is age 18 or older        Passed - Normal serum creatinine on file in past 12 months     Recent Labs   Lab Test 02/15/19  1439   CR 1.23             Passed - Normal serum potassium on file in past 12 months     Recent Labs   Lab Test 02/15/19  1439   POTASSIUM 4.2             metoprolol succinate ER (TOPROL-XL) 50 MG 24 hr tablet [Pharmacy Med Name: METOPROLOL ER 50MG  TAB]  Last Written Prescription Date:  02/21/19  Last Fill Quantity: 90,  # refills: 1   Last Office Visit with [facility] or [facility] prescribing provider:  06/14/19-Thein   Future Office Visit:    Next 5 appointments (look out 90 " "days)    Sep 09, 2019  1:40 PM CDT  SHORT with Juan Carlos Jansen MD  [facility]) [address] [place] [id number] [phone]        90 tablet 1     Sig: TAKE 1 TABLET BY MOUTH ONCE DAILY IN THE EVENING FOR BLOOD PRESSURE       Beta-Blockers Protocol Passed - 9/6/2019  1:47 PM        Passed - Blood pressure under 140/90 in past 12 months     BP Readings from Last 3 Encounters:   06/14/19 130/60   03/05/19 162/58   02/26/19 140/63                 Passed - Patient is age 6 or older        Passed - Recent (12 mo) or future (30 days) visit within the authorizing provider's specialty     Patient had office visit in the last 12 months or has a visit in the next 30 days with authorizing provider or within the authorizing provider's specialty.  See \"Patient Info\" tab in inbasket, or \"Choose Columns\" in Meds & Orders section of the refill encounter.              Passed - Medication is active on med list          "

## 2019-09-09 ENCOUNTER — OFFICE VISIT (OUTPATIENT)
Dept: FAMILY MEDICINE | Facility: CLINIC | Age: 84
End: 2019-09-09
Payer: COMMERCIAL

## 2019-09-09 ENCOUNTER — ANCILLARY PROCEDURE (OUTPATIENT)
Dept: GENERAL RADIOLOGY | Facility: CLINIC | Age: 84
End: 2019-09-09
Attending: FAMILY MEDICINE
Payer: COMMERCIAL

## 2019-09-09 VITALS
WEIGHT: 212 LBS | TEMPERATURE: 97.7 F | DIASTOLIC BLOOD PRESSURE: 79 MMHG | RESPIRATION RATE: 16 BRPM | HEIGHT: 69 IN | HEART RATE: 53 BPM | OXYGEN SATURATION: 98 % | SYSTOLIC BLOOD PRESSURE: 181 MMHG | BODY MASS INDEX: 31.4 KG/M2

## 2019-09-09 DIAGNOSIS — M25.50 PAIN IN JOINT, MULTIPLE SITES: ICD-10-CM

## 2019-09-09 DIAGNOSIS — I12.9 BENIGN HYPERTENSION WITH CHRONIC KIDNEY DISEASE, STAGE III (H): ICD-10-CM

## 2019-09-09 DIAGNOSIS — M25.50 PAIN IN JOINT, MULTIPLE SITES: Primary | ICD-10-CM

## 2019-09-09 DIAGNOSIS — N18.30 BENIGN HYPERTENSION WITH CHRONIC KIDNEY DISEASE, STAGE III (H): ICD-10-CM

## 2019-09-09 DIAGNOSIS — E03.4 HYPOTHYROIDISM DUE TO ACQUIRED ATROPHY OF THYROID: ICD-10-CM

## 2019-09-09 DIAGNOSIS — Z23 NEED FOR PROPHYLACTIC VACCINATION AND INOCULATION AGAINST INFLUENZA: ICD-10-CM

## 2019-09-09 DIAGNOSIS — M25.562 ACUTE PAIN OF BOTH KNEES: ICD-10-CM

## 2019-09-09 DIAGNOSIS — M25.561 ACUTE PAIN OF BOTH KNEES: ICD-10-CM

## 2019-09-09 DIAGNOSIS — M70.61 TROCHANTERIC BURSITIS OF BOTH HIPS: ICD-10-CM

## 2019-09-09 DIAGNOSIS — E78.5 HYPERLIPIDEMIA LDL GOAL <130: ICD-10-CM

## 2019-09-09 DIAGNOSIS — M70.62 TROCHANTERIC BURSITIS OF BOTH HIPS: ICD-10-CM

## 2019-09-09 DIAGNOSIS — M54.50 LUMBOSACRAL PAIN: ICD-10-CM

## 2019-09-09 DIAGNOSIS — K21.9 GASTROESOPHAGEAL REFLUX DISEASE WITHOUT ESOPHAGITIS: ICD-10-CM

## 2019-09-09 LAB
ALT SERPL W P-5'-P-CCNC: 15 U/L (ref 0–70)
CHOLEST SERPL-MCNC: 168 MG/DL
CREAT SERPL-MCNC: 1.18 MG/DL (ref 0.66–1.25)
CRP SERPL-MCNC: <2.9 MG/L (ref 0–8)
ERYTHROCYTE [DISTWIDTH] IN BLOOD BY AUTOMATED COUNT: 13.3 % (ref 10–15)
ERYTHROCYTE [SEDIMENTATION RATE] IN BLOOD BY WESTERGREN METHOD: 18 MM/H (ref 0–20)
GFR SERPL CREATININE-BSD FRML MDRD: 53 ML/MIN/{1.73_M2}
HCT VFR BLD AUTO: 40.7 % (ref 40–53)
HDLC SERPL-MCNC: 39 MG/DL
HGB BLD-MCNC: 13.1 G/DL (ref 13.3–17.7)
LDLC SERPL CALC-MCNC: 90 MG/DL
MCH RBC QN AUTO: 30.9 PG (ref 26.5–33)
MCHC RBC AUTO-ENTMCNC: 32.2 G/DL (ref 31.5–36.5)
MCV RBC AUTO: 96 FL (ref 78–100)
NONHDLC SERPL-MCNC: 129 MG/DL
PLATELET # BLD AUTO: 165 10E9/L (ref 150–450)
POTASSIUM SERPL-SCNC: 4.8 MMOL/L (ref 3.4–5.3)
RBC # BLD AUTO: 4.24 10E12/L (ref 4.4–5.9)
T4 FREE SERPL-MCNC: 1.02 NG/DL (ref 0.76–1.46)
TRIGL SERPL-MCNC: 193 MG/DL
TSH SERPL DL<=0.005 MIU/L-ACNC: 4.24 MU/L (ref 0.4–4)
WBC # BLD AUTO: 9.4 10E9/L (ref 4–11)

## 2019-09-09 PROCEDURE — 86140 C-REACTIVE PROTEIN: CPT | Performed by: FAMILY MEDICINE

## 2019-09-09 PROCEDURE — 85652 RBC SED RATE AUTOMATED: CPT | Performed by: FAMILY MEDICINE

## 2019-09-09 PROCEDURE — 73560 X-RAY EXAM OF KNEE 1 OR 2: CPT | Mod: 59

## 2019-09-09 PROCEDURE — 90662 IIV NO PRSV INCREASED AG IM: CPT | Performed by: FAMILY MEDICINE

## 2019-09-09 PROCEDURE — 80061 LIPID PANEL: CPT | Performed by: FAMILY MEDICINE

## 2019-09-09 PROCEDURE — 82565 ASSAY OF CREATININE: CPT | Performed by: FAMILY MEDICINE

## 2019-09-09 PROCEDURE — G0008 ADMIN INFLUENZA VIRUS VAC: HCPCS | Performed by: FAMILY MEDICINE

## 2019-09-09 PROCEDURE — 84439 ASSAY OF FREE THYROXINE: CPT | Performed by: FAMILY MEDICINE

## 2019-09-09 PROCEDURE — 84460 ALANINE AMINO (ALT) (SGPT): CPT | Performed by: FAMILY MEDICINE

## 2019-09-09 PROCEDURE — 85027 COMPLETE CBC AUTOMATED: CPT | Performed by: FAMILY MEDICINE

## 2019-09-09 PROCEDURE — 84132 ASSAY OF SERUM POTASSIUM: CPT | Performed by: FAMILY MEDICINE

## 2019-09-09 PROCEDURE — 84443 ASSAY THYROID STIM HORMONE: CPT | Performed by: FAMILY MEDICINE

## 2019-09-09 PROCEDURE — 36415 COLL VENOUS BLD VENIPUNCTURE: CPT | Performed by: FAMILY MEDICINE

## 2019-09-09 PROCEDURE — 72100 X-RAY EXAM L-S SPINE 2/3 VWS: CPT

## 2019-09-09 PROCEDURE — 99214 OFFICE O/P EST MOD 30 MIN: CPT | Mod: 25 | Performed by: FAMILY MEDICINE

## 2019-09-09 RX ORDER — LISINOPRIL 40 MG/1
40 TABLET ORAL DAILY
Qty: 90 TABLET | Refills: 1 | Status: SHIPPED | OUTPATIENT
Start: 2019-09-09 | End: 2020-03-16

## 2019-09-09 RX ORDER — METOPROLOL SUCCINATE 50 MG/1
50 TABLET, EXTENDED RELEASE ORAL EVERY EVENING
Qty: 90 TABLET | Refills: 1 | Status: SHIPPED | OUTPATIENT
Start: 2019-09-09 | End: 2020-04-02

## 2019-09-09 RX ORDER — LEVOTHYROXINE SODIUM 88 UG/1
88 TABLET ORAL DAILY
Qty: 90 TABLET | Refills: 1 | Status: SHIPPED | OUTPATIENT
Start: 2019-09-09 | End: 2019-09-15

## 2019-09-09 RX ORDER — LOVASTATIN 20 MG
20 TABLET ORAL EVERY EVENING
Qty: 90 TABLET | Refills: 1 | Status: SHIPPED | OUTPATIENT
Start: 2019-09-09 | End: 2020-05-20

## 2019-09-09 ASSESSMENT — ANXIETY QUESTIONNAIRES
GAD7 TOTAL SCORE: 0
2. NOT BEING ABLE TO STOP OR CONTROL WORRYING: NOT AT ALL
1. FEELING NERVOUS, ANXIOUS, OR ON EDGE: NOT AT ALL
3. WORRYING TOO MUCH ABOUT DIFFERENT THINGS: NOT AT ALL
6. BECOMING EASILY ANNOYED OR IRRITABLE: NOT AT ALL
5. BEING SO RESTLESS THAT IT IS HARD TO SIT STILL: NOT AT ALL
IF YOU CHECKED OFF ANY PROBLEMS ON THIS QUESTIONNAIRE, HOW DIFFICULT HAVE THESE PROBLEMS MADE IT FOR YOU TO DO YOUR WORK, TAKE CARE OF THINGS AT HOME, OR GET ALONG WITH OTHER PEOPLE: NOT DIFFICULT AT ALL
7. FEELING AFRAID AS IF SOMETHING AWFUL MIGHT HAPPEN: NOT AT ALL

## 2019-09-09 ASSESSMENT — PATIENT HEALTH QUESTIONNAIRE - PHQ9
5. POOR APPETITE OR OVEREATING: NOT AT ALL
SUM OF ALL RESPONSES TO PHQ QUESTIONS 1-9: 1

## 2019-09-09 ASSESSMENT — MIFFLIN-ST. JEOR: SCORE: 1598.04

## 2019-09-09 ASSESSMENT — PAIN SCALES - GENERAL: PAINLEVEL: EXTREME PAIN (8)

## 2019-09-09 NOTE — PROGRESS NOTES
"Subjective     James Lama is a 89 year old male who presents to clinic today for the following health issues. He is accompanied by his wife.     HPI   Joint Pain    Onset: 3 weeks ago    Description:   Location: left knee, right knee, left hip, right hip and low back (not so much today)  Character: Dull ache    Intensity: severe    Progression of Symptoms: intermittent    Accompanying Signs & Symptoms:  Other symptoms: notes that the hips are tender    History:   Previous similar pain: no       Precipitating factors:   Trauma or overuse: maybe, had put in a large flagpole just before onset, moving heavy objects in past 1-2 weeks     Alleviating factors:  Improved by: acetaminophen and Aspirin, and sitting relieves the hips and back, but left knee still hurts (usually he considers his RT knee to be the worst)    Therapies Tried and outcome: Aspirin and Tylenol: temporary relief      Hypertension Follow-up      Do you check your blood pressure regularly outside of the clinic? Yes normally at goal    Are you following a low salt diet? No    Are your blood pressures ever more than 140 on the top number (systolic) OR more   than 90 on the bottom number (diastolic), for example 140/90? Yes, but rarely.  Pt missed today's BP meds    Past medical, family, and social histories, medications, and allergies are reviewed and updated in [facility].     Review of Systems   ROS COMP: Constitutional, HEENT, cardiovascular, pulmonary, gi and gu systems are negative, except as otherwise noted.      Objective    BP (!) 181/79   Pulse 53   Temp 97.7  F (36.5  C) (Oral)   Resp 16   Ht 1.746 m (5' 8.75\")   Wt 96.2 kg (212 lb)   SpO2 98%   BMI 31.54 kg/m    Body mass index is 31.54 kg/m .  Physical Exam   GENERAL: healthy, alert and no distress  EYES: Eyes grossly normal to inspection, PERRL, EOMI, sclerae white and conjunctivae normal  RESP: lungs clear to auscultation - no crackles or wheezes, no areas of dullness, no tachypnea  CV: " Heart regular rate and rhythm without murmur, click or rub. No peripheral edema and peripheral pulses strong  MS: no gross musculoskeletal defects noted, no edema, no focal tenderness of knees, no laxity of knees with ligamentous testing, no lumbar spinous or paraspinous tenderness. He does have tenderness over the greater trochanters R>L which reproduces his hip pain symptoms   SKIN: no suspicious lesions or rashes to visible skin  NEURO: Normal strength and tone, sensory exam grossly normal, mentation intact, oriented times 3 and cranial nerves 2-12 intact  PSYCH: mentation appears normal, affect normal/bright     Diagnostic Test Results:  Results for orders placed or performed in visit on 09/09/19 (from the past 24 hour(s))   CBC with platelets   Result Value Ref Range    WBC 9.4 4.0 - 11.0 10e9/L    RBC Count 4.24 (L) 4.4 - 5.9 10e12/L    Hemoglobin 13.1 (L) 13.3 - 17.7 g/dL    Hematocrit 40.7 40.0 - 53.0 %    MCV 96 78 - 100 fl    MCH 30.9 26.5 - 33.0 pg    MCHC 32.2 31.5 - 36.5 g/dL    RDW 13.3 10.0 - 15.0 %    Platelet Count 165 150 - 450 10e9/L   ESR: Erythrocyte sedimentation rate   Result Value Ref Range    Sed Rate 18 0 - 20 mm/h       A lumbar X-Ray was ordered. My reading of this film is degenerative changes at multiple levels incl osteophyte formation. Scoliosis. (No comparison films available: pending review by Radiologist.)   A bilat knee X-Ray was ordered. My reading of this film is no significant DJD. (No comparison films available: pending review by Radiologist.)         Assessment & Plan     ASSESSMENT/PLAN:  (M25.50) Pain in joint, multiple sites  (primary encounter diagnosis)  Comment:   Plan: CBC with platelets, ESR: Erythrocyte         sedimentation rate, CRP, inflammation, JUST IN         CASE,          (M25.561,  M25.562) Acute pain of both knees  Comment: on chronic, but x-ray not significantly abnormal  Plan: XR Knee Bilateral 1/2 Views, ORTHO          REFERRAL, [facility], AND  CHIROPRACTIC         REFERRAL          (M70.61,  M70.62) Trochanteric bursitis of both hips  Comment:   Plan: ORTHO  REFERRAL, [facility], AND         CHIROPRACTIC REFERRAL        Handouts provided. 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  Plan: XR Lumbar Spine 2/3 Views            (I12.9,  N18.3) Benign hypertension with chronic kidney disease, stage III (H)  Comment: uncontrolled, not typical for him  Plan: lisinopril (PRINIVIL/ZESTRIL) 40 MG tablet,         metoprolol succinate ER (TOPROL-XL) 50 MG 24 hr        tablet, Potassium, Creatinine        Recheck at next visit (9/19 Optometry), f/u with me if still high    (E03.4) Hypothyroidism due to acquired atrophy of thyroid  Comment: most recent TSH high  Plan: levothyroxine (SYNTHROID/LEVOTHROID) 88 MCG         tablet, TSH with free T4 reflex        Increase levothyroxine to 100mcg if high again.    (E78.5) Hyperlipidemia LDL goal <130  Comment: refill request  Plan: lovastatin (MEVACOR) 20 MG tablet, Lipid panel         reflex to direct LDL Non-fasting, ALT        F/u 6 mo @ [facility]    (K21.9) Gastroesophageal reflux disease without esophagitis  Comment:   Plan: omeprazole (PRILOSEC) 20 MG DR capsule          (Z23) Need for prophylactic vaccination and inoculation against influenza  Comment: Influenza vaccine offered and accepted by patient. He has received it before without problems.   Plan: VACCINE ADMINISTRATION, INITIAL, HC FLU         VACCINE, INCREASED ANTIGEN, PRESV FREE             Return in about 6 months (around 3/9/2020) for full physical, lab tests, recheck medications.    Juan Carlos Jansen MD  [facility]

## 2019-09-09 NOTE — LETTER
2019      James Lama  [address]  [place] [address]              Dear James Lama        Your follow-up TSH is abnormal suggesting you are currently under-treated.  So I have increased your levothyroxine dose to 100 micrograms per day. You should have your TSH rechecked in 3 months.     Enclosed is a copy of the results.  It was a pleasure to see you at your last appointment.    If you have any questions or concerns, please call myself or my nurse at [phone].      Sincerely,      Juan Carlos Jansen MD/DELYM Maier MA  [employer]      Results for orders placed or performed in visit on 19   Lipid panel reflex to direct LDL Non-fasting   Result Value Ref Range    Cholesterol 168 <200 mg/dL    Triglycerides 193 (H) <150 mg/dL    HDL Cholesterol 39 (L) >39 mg/dL    LDL Cholesterol Calculated 90 <100 mg/dL    Non HDL Cholesterol 129 <130 mg/dL   ALT   Result Value Ref Range    ALT 15 0 - 70 U/L   Potassium   Result Value Ref Range    Potassium 4.8 3.4 - 5.3 mmol/L   Creatinine   Result Value Ref Range    Creatinine 1.18 0.66 - 1.25 mg/dL    GFR Estimate 53 (L) >60 mL/min/[1.73_m2]    GFR Estimate If Black 62 >60 mL/min/[1.73_m2]   TSH with free T4 reflex   Result Value Ref Range    TSH 4.24 (H) 0.40 - 4.00 mU/L   CBC with platelets   Result Value Ref Range    WBC 9.4 4.0 - 11.0 10e9/L    RBC Count 4.24 (L) 4.4 - 5.9 10e12/L    Hemoglobin 13.1 (L) 13.3 - 17.7 g/dL    Hematocrit 40.7 40.0 - 53.0 %    MCV 96 78 - 100 fl    MCH 30.9 26.5 - 33.0 pg    MCHC 32.2 31.5 - 36.5 g/dL    RDW 13.3 10.0 - 15.0 %    Platelet Count 165 150 - 450 10e9/L   ESR: Erythrocyte sedimentation rate   Result Value Ref Range    Sed Rate 18 0 - 20 mm/h   CRP, inflammation   Result Value Ref Range    CRP Inflammation <2.9 0.0 - 8.0 mg/L   T4 free   Result Value Ref Range    T4 Free 1.02 0.76 - 1.46 ng/dL                 RE: James Lama   MRN: [id number]  : 1927  ENC DATE: Sep 9, 2019

## 2019-09-09 NOTE — PATIENT INSTRUCTIONS
At [facility], we strive to deliver an exceptional experience to you, every time we see you.  If you receive a survey in the mail, please send us back your thoughts. We really do value your feedback.    Based on your medical history, these are the current health maintenance/preventive care services that you are due for (some may have been done at this visit.)  Health Maintenance Due   Topic Date Due     ZOSTER IMMUNIZATION (2 of 3) 06/21/2012     EYE EXAM  11/01/2018     MEDICARE ANNUAL WELLNESS VISIT  03/08/2019     FALL RISK ASSESSMENT  03/08/2019     MARJ ASSESSMENT  08/06/2019     PHQ-9  08/06/2019     INFLUENZA VACCINE (1) 09/01/2019         Suggested websites for health information:  Www.[employer].org : Up to date and easily searchable information on multiple topics.  Www.medlineplus.gov : medication info, interactive tutorials, watch real surgeries online  Www.familydoctor.org : good info from the Academy of Family Physicians  Www.cdc.gov : public health info, travel advisories, epidemics (H1N1)  Www.aap.org : children's health info, normal development, vaccinations  Www.health.state.[place].us : [place] dept of health, public health issues in [place], N1N1    Your care team:                            Family Medicine Internal Medicine   MD Trino Lombardo MD Shantel Branch-Fleming, MD Katya Georgiev PA-C Nam Ho, MD Pediatrics   WERO Martinez, SAGAR Collins APRN MD Chelo Gilliam MD Deborah Mielke, MD Kim Thein, APRN [employer]      Clinic hours: Monday - Thursday 7 am-7 pm; Fridays 7 am-5 pm.   Urgent care: Monday - Friday 11 am-9 pm; Saturday and Sunday 9 am-5 pm.  Pharmacy : Monday -Thursday 8 am-8 pm; Friday 8 am-6 pm; Saturday and Sunday 9 am-5 pm.     Clinic: [phone]   Pharmacy: [phone]    Patient Education     What Is Bursitis?  A bursa is a fluid-filled sac. It helps cushion the muscles, tendons, and bones around  a joint. When a bursa becomes inflamed, it s called bursitis. Common symptoms are pain, tenderness, and swelling that limits movement of the joint.  What causes bursitis?  Bursitis is most often caused by overuse of a joint. The repeated movements bother the bursa and may cause it to swell. When that happens, other nearby tissues may become inflamed or have less space to move. Bursitis is most common in large joints such as the knee, shoulder, and hip.       Nonsurgical treatment involves both rest and exercise.      How is bursitis treated?  To help lessen pain and swelling, you may need one or more of these treatments:     Rest gives the bursa time to heal. This means limiting activities that put stress on the joint.    Ice may help. It's put on the area for 15 to 20 minutes several times a day, or as directed.    Anti-inflammatory medicines help with painful swelling. In some cases, this can be shots of cortisone or other steroid medicines into the bursa.    Splints and supportive bandages improve your comfort. They also allow the bursa to heal.    Physical therapy may be used to gain flexibility and build up muscles that support the joint.    Aspiration removes extra fluid from the bursa using a needle. This can help your healthcare provider find out what is causing your bursitis. It might be an infection or overuse.     Surgery can be used to remove an inflamed or infected bursa. This is rarely needed.  Date Last Reviewed: 1/1/2018 2000-2018 [employer]. [address] [place] [address]. All rights reserved. This information is not intended as a substitute for professional medical care. Always follow your healthcare professional's instructions.         https://orthoinfo.aaos.org/en/diseases--conditions/hip-bursitis

## 2019-09-09 NOTE — NURSING NOTE
"Injectable Influenza Immunization Documentation    1.  Has the patient received the information for the injectable influenza vaccine? YES     2. Is the patient 6 months of age or older? YES     3. Does the patient have any of the following contraindications?         Severe allergy to eggs? No     Severe allergic reaction to previous influenza vaccines? No   Severe allergy to latex? No       History of Guillain-Philadelphia syndrome? No     Currently have a temperature greater than 100.4F? No        4.  Severely egg allergic patients should have flu vaccine eligibility assessed by an MD, RN, or pharmacist, and those who received flu vaccine should be observed for 15 min by an MD, RN, Pharmacist, Medical Technician, or member of clinic staff.\": YES    5. Latex-allergic patients should be given latex-free influenza vaccine Yes. Please reference the Vaccine latex table to determine if your clinic s product is latex-containing.       Vaccination given by Matt Hilliard CMA          "

## 2019-09-10 RX ORDER — METOPROLOL SUCCINATE 50 MG/1
TABLET, EXTENDED RELEASE ORAL
Qty: 90 TABLET | Refills: 1 | OUTPATIENT
Start: 2019-09-10

## 2019-09-10 RX ORDER — LISINOPRIL 40 MG/1
TABLET ORAL
Qty: 90 TABLET | Refills: 1 | OUTPATIENT
Start: 2019-09-10

## 2019-09-10 ASSESSMENT — ANXIETY QUESTIONNAIRES: GAD7 TOTAL SCORE: 0

## 2019-09-11 ENCOUNTER — ANCILLARY PROCEDURE (OUTPATIENT)
Dept: GENERAL RADIOLOGY | Facility: CLINIC | Age: 84
End: 2019-09-11
Attending: PHYSICIAN ASSISTANT
Payer: COMMERCIAL

## 2019-09-11 ENCOUNTER — OFFICE VISIT (OUTPATIENT)
Dept: ORTHOPEDICS | Facility: CLINIC | Age: 84
End: 2019-09-11
Payer: COMMERCIAL

## 2019-09-11 VITALS
RESPIRATION RATE: 16 BRPM | WEIGHT: 212 LBS | DIASTOLIC BLOOD PRESSURE: 80 MMHG | HEIGHT: 69 IN | BODY MASS INDEX: 31.4 KG/M2 | SYSTOLIC BLOOD PRESSURE: 172 MMHG

## 2019-09-11 DIAGNOSIS — M25.562 ACUTE BILATERAL KNEE PAIN: Primary | ICD-10-CM

## 2019-09-11 DIAGNOSIS — M25.561 ACUTE BILATERAL KNEE PAIN: Primary | ICD-10-CM

## 2019-09-11 DIAGNOSIS — G89.29 BILATERAL CHRONIC KNEE PAIN: ICD-10-CM

## 2019-09-11 DIAGNOSIS — M17.0 PRIMARY OSTEOARTHRITIS OF BOTH KNEES: ICD-10-CM

## 2019-09-11 DIAGNOSIS — M25.562 BILATERAL CHRONIC KNEE PAIN: ICD-10-CM

## 2019-09-11 DIAGNOSIS — M25.561 BILATERAL CHRONIC KNEE PAIN: ICD-10-CM

## 2019-09-11 PROCEDURE — 99213 OFFICE O/P EST LOW 20 MIN: CPT | Performed by: ORTHOPAEDIC SURGERY

## 2019-09-11 PROCEDURE — 73560 X-RAY EXAM OF KNEE 1 OR 2: CPT | Mod: RT

## 2019-09-11 ASSESSMENT — PAIN SCALES - GENERAL: PAINLEVEL: MILD PAIN (3)

## 2019-09-11 ASSESSMENT — MIFFLIN-ST. JEOR: SCORE: 1598.04

## 2019-09-11 NOTE — PROGRESS NOTES
CHIEF COMPLAINT:   Chief Complaint   Patient presents with     Knee Pain     Bilateral knee pain. Onset: 1 week. R>L, mainly anterior pain with stairs. Just Ibuprofen and Tylenol for treatment.    .    James Lama is seen today in the [facility] for evaluation of bilateral knee pain at the request of Dr. Juan Carlos Jansen MD.    HISTORY OF PRESENT ILLNESS    James Lama is a 89 year old male seen for evaluation of ongoing bilateral knee pain with no known injury. More right knee than left knee.  Pain has been present for 1 week. Using tylenol, ibuprofen. Pain mostly in front of the knee with stairs. Has pain with walking but worse with stairs. Ok at rest. Denies numbness and tingling. Has problems with hips, low back pain. Most recently left low back the past day. Has had pain in knees in the past, not as bad as it was a week ago. Pain has been improving since then.    Present symptoms: pain anteriorly, pain dull/achy , mild pain.    Pain severity: 3/10  Frequency of symptoms: frequently  Exacerbating Factors: weight bearing, stairs, [id number]  Relieving Factors: rest, sitting, positional changes, medications: tylenol, aspirin, ibuprofen  Night Pain: No  Pain while at rest: No   Numbness or tingling: No , but has neuropathy in his feet.  Patient has tried:     NSAIDS: Yes      Physical Therapy: No      Activity modification: Yes      Bracing: No      Injections: No     Ice: No      Assistive device:  No     Other: tylenol.      Other PMH:  has a past medical history of Actinic keratosis, Arthritis, Basal cell carcinoma (Pre-2009), BPH, Diverticulosis (1/02), Erectile dysfunction, Essential hypertension, benign, Gout, Hyperlipidemia LDL goal <130, Hypothyroidism, KESHA (obstructive sleep apnoea), Perennial allergic rhinitis, Prostate cancer (H) (1/04), and Recurrent BCC (basal cell carcinoma). He also has no past medical history of Acne vulgaris, Allergies, Amblyopia, Cataract,  Diabetes mellitus (H), Diabetic retinopathy (H), Eczema, Glaucoma (increased eye pressure), Heart valve disorder, Malignant melanoma nos, Pacemaker, Photosensitive contact dermatitis, Psoriasis, Retinal detachment, Senile macular degeneration, Squamous cell carcinoma, Strabismus, Type II or unspecified type diabetes mellitus without mention of complication, not stated as uncontrolled, Unspecified asthma(493.90), Urticaria, or Uveitis.  Patient Active Problem List   Diagnosis     Prostate cancer (H)     Hyperlipidemia LDL goal <130     Benign hypertension with chronic kidney disease, stage III (H)     KESHA (obstructive sleep apnea)     Gout     Diverticulosis     Perennial allergic rhinitis     GERD (gastroesophageal reflux disease)     Hypothyroidism due to acquired atrophy of thyroid     Erectile dysfunction     Hypertension goal BP (blood pressure) < 140/90     Right inguinal hernia     History of basal cell carcinoma     Advance care planning     Paresthesias/numbness, both feet     Vitamin B12 deficiency (non anemic)     Health Care Home     Actinic keratosis     Obesity, Class I, BMI 30-34.9     Recurrent falls     Cortical cataract of both eyes     Nuclear sclerosis of both eyes     Chronic kidney disease, stage 3 (moderate) (H)     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    Medications:   Current Outpatient Medications:      acetaminophen (TYLENOL) 500 MG tablet, Take 2 tablets by mouth 2 times daily as needed for pain (knee)., Disp: , Rfl: 0     ASPIRIN 81 MG OR TABS, 1 tablet daily*, Disp: , Rfl:       Cyanocobalamin 1000 MCG TABS, 1 tablet daily for foot neuropathy (vitamin B12)., Disp: , Rfl:      fluticasone (FLONASE) 50 MCG/ACT spray, USE ONE TO TWO SPRAY(S) IN EACH NOSTRIL ONCE DAILY FOR  ALLERGY  PREVENTION, Disp: 1 Bottle, Rfl: 1     levothyroxine (SYNTHROID/LEVOTHROID) 88 MCG tablet, Take 1 tablet (88 mcg) by mouth daily for thyroid., Disp: 90 tablet, Rfl: 1     lisinopril (PRINIVIL/ZESTRIL) 40 MG tablet, Take 1 tablet (40 mg) by mouth daily for blood pressure., Disp: 90 tablet, Rfl: 1     lovastatin (MEVACOR) 20 MG tablet, Take 1 tablet (20 mg) by mouth every evening for cholesterol., Disp: 90 tablet, Rfl: 1     metoprolol succinate ER (TOPROL-XL) 50 MG 24 hr tablet, Take 1 tablet (50 mg) by mouth every evening for blood pressure., Disp: 90 tablet, Rfl: 1     Multiple Vitamins-Minerals (PRESERVISION AREDS PO), Take 1 capsule by mouth 2 times daily (with meals) (PreserVision AREDS 2), Disp: , Rfl:      omeprazole (PRILOSEC) 20 MG DR capsule, Take 2 capsules (40 mg) by mouth daily for reflux. Take 30-60 minutes before a meal., Disp: 180 capsule, Rfl: 3     polyethylene glycol (MIRALAX) powder, Take 17 g (1 capful) by mouth daily, Disp: 510 g, Rfl: 1     VITAMIN D, CHOLECALCIFEROL, PO, Take 1,000 Units by mouth daily , Disp: , Rfl:     Allergies:   Allergies   Allergen Reactions     Acrylate Copolymer Rash     topical       Social Hx: retired. [employer] vet.   reports that he has never smoked. He has never used smokeless tobacco. He reports that he does not drink alcohol or use drugs.    Family Hx: family history includes Cancer in his father; Myocardial Infarction in his sister; Thyroid Disease in his sister.    REVIEW OF SYSTEMS: 10 point ROS neg other than the symptoms noted above in the HPI and PMH. Notables include  CONSTITUTIONAL:NEGATIVE for fever, chills, change in weight  INTEGUMENTARY/SKIN: NEGATIVE for worrisome rashes, moles or lesions  MUSCULOSKELETAL:See HPI above  NEURO: NEGATIVE for weakness,  "dizziness or paresthesias    PHYSICAL EXAM:  BP (!) 172/80   Resp 16   Ht 1.746 m (5' 8.75\")   Wt 96.2 kg (212 lb)   BMI 31.54 kg/m     GENERAL APPEARANCE: elderly, alert, no distress  SKIN: no suspicious lesions or rashes  NEURO: Normal strength and tone, mentation intact and speech normal  PSYCH:  mentation appears normal and affect normal, not anxious  RESPIRATORY: No increased work of breathing.  HANDS: no clubbing, nail pitting  LYMPH: no palpable popliteal lymphadenopathy.    BILATERAL LOWER EXTREMITIES:  Gait: normal  Alignment: varus  No gross deformities or masses.  Bilateral Quad atrophy, strength weak  Intact sensation deep peroneal nerve, superficial peroneal nerve, med/lat tibial nerve, sural nerve, saphenous nerve  Intact EHL, EDL, TA, FHL, GS, quadriceps hamstrings and hip flexors  Toes warm and well perfused, brisk capillary refill. Palpable 2+ dp pulses.  Bilateral calf soft and nttp or squeeze.  Edema: 1+, Pitting, bilateral.    LEFT KNEE EXAM:    Skin: intact, no ecchymosis or erythema  Squat: 100 %, not limited by pain.     ROM: full extension to 125 flexion  Tight hamstrings on straight leg raise.  Effusion: trace  Tender: medial joint line, pes   nontender to palpation lat joint line, anterior or posterior knee  McMurrays: negative    MCL: stable, and non-painful at both 0 and 30 degrees knee flexion  Varus stress: stable, and non-painful at both 0 and 30 degrees knee flexion  Lachmans: neg, firm endpoint  Posterior Drawer stable  Patellofemoral joint:                Apprehension: negative              Crepitations: mild   Grind: positive.    RIGHT KNEE EXAM:    Skin: intact, no ecchymosis or erythema  Squat: 100 %, not limited by pain.     ROM: full extension to 125 flexion  Tight hamstrings on straight leg raise.  Effusion: trace  Tender: medial joint line, pes, anterior   nontender to palpation lateral joint line, posterior knee  McMurrays: negative    MCL: stable, and non-painful at " "both 0 and 30 degrees knee flexion  Varus stress: stable, and non-painful at both 0 and 30 degrees knee flexion  Lachmans: neg, firm endpoint  Posterior Drawer stable  Patellofemoral joint:                Apprehension: negative              Crepitations: mild-moderate   Grind: positive.    X-RAY:  2 views bilateral knee from 9/10/2019, bilateral sunrise views 9/11/2019 were reviewed in clinic today. On my review, no obvious fractures or dislocations. Mild bilateral medial narrowing, right more than left. Small tricompartmental osteophytes. On sunrise view, near bone on bone medial patello-femoral space right knee.       ASSESSMENT/PLAN: James Lama is a 89 year old male with acute bilateral knee pain, primary osteoarthritis.     * reviewed imaging studies with patient, showing arthritic changes, or wearing of the cartilage in the knee. This can be caused by normal \"wear and tear\" over the years or following prior injury to the knee.  * most arthritis in right knee, under the knee cap. This would coincide with more pain with stairs, etc.    Non-surgical treatment for knee arthritis includes:    * rest, sitting  * Activity modification - avoid impact activities or activities that aggravate symptoms.  * NSAIDS (non-steroidal anti-inflammatory medications; e.g. Aleve, advil, motrin, ibuprofen) - regular use for inflammation ( twice daily or three times daily), with food, as long as no contra-indications Please discuss with primary care doctor if needed  * ice, 15-20 minutes at a time several times a day or as needed.  * Strengthening of quadriceps muscles  * Physical Therapy for strengthening, stretching and range of motion exercises of legs  * Tylenol as needed for pain, consider Tylenol arthritis or similar  * Weight loss: Weight loss:  Body mass index is 31.54 kg/m .. weight loss benefits, not only for the current pain symptoms, but also overall health. Recommend a good diet plan that works for the patient, with " "the assistance of a dietician or primary care doctor as needed. Also, a good, low-impact exercise program for at least 20 minutes per day, 3 times per week, such as exercise bike, elliptical , or pool.  * Exercise: low impact such as stationary bike, elliptical, pool.  * Injections: cortisone versus viscosupplementation (hyaluronic acid, \"rooster comb\", \"gel shots\"); risks and perceived benefits discussed today. Patient elects NOT to proceed. He states the knees are feeling better so will hold off today.  * Bracing: bracing the knee may offer some relief of symptoms when worn and provide some stability.  * over the counter supplements such as glucosamine and chondroitin sulfate may help with joint pain.  * topical ointments may help as well    * return to clinic as needed.          Rico Valladares M.D., M.S.  Dept. of Orthopaedic Surgery  [facility]      "

## 2019-09-11 NOTE — LETTER
9/11/2019         RE: James Lama  [address]  [facility] [address]        Dear Colleague,    Thank you for referring your patient, James Lama, to the [facility]. Please see a copy of my visit note below.    CHIEF COMPLAINT:   Chief Complaint   Patient presents with     Knee Pain     Bilateral knee pain. Onset: 1 week. R>L, mainly anterior pain with stairs. Just Ibuprofen and Tylenol for treatment.    .    James Lama is seen today in the [facility] for evaluation of bilateral knee pain at the request of Dr. Juan Carlos Jansen MD.    HISTORY OF PRESENT ILLNESS    James Lama is a 89 year old male seen for evaluation of ongoing bilateral knee pain with no known injury. More right knee than left knee.  Pain has been present for 1 week. Using tylenol, ibuprofen. Pain mostly in front of the knee with stairs. Has pain with walking but worse with stairs. Ok at rest. Denies numbness and tingling. Has problems with hips, low back pain. Most recently left low back the past day. Has had pain in knees in the past, not as bad as it was a week ago. Pain has been improving since then.    Present symptoms: pain anteriorly, pain dull/achy , mild pain.    Pain severity: 3/10  Frequency of symptoms: frequently  Exacerbating Factors: weight bearing, stairs, [id number]  Relieving Factors: rest, sitting, positional changes, medications: tylenol, aspirin, ibuprofen  Night Pain: No  Pain while at rest: No   Numbness or tingling: No , but has neuropathy in his feet.  Patient has tried:     NSAIDS: Yes      Physical Therapy: No      Activity modification: Yes      Bracing: No      Injections: No     Ice: No      Assistive device:  No     Other: tylenol.      Other PMH:  has a past medical history of Actinic keratosis, Arthritis, Basal cell carcinoma (Pre-2009), BPH, Diverticulosis (1/02), Erectile dysfunction, Essential hypertension, benign, Gout,  Hyperlipidemia LDL goal <130, Hypothyroidism, KESHA (obstructive sleep apnoea), Perennial allergic rhinitis, Prostate cancer (H) (1/04), and Recurrent BCC (basal cell carcinoma). He also has no past medical history of Acne vulgaris, Allergies, Amblyopia, Cataract, Diabetes mellitus (H), Diabetic retinopathy (H), Eczema, Glaucoma (increased eye pressure), Heart valve disorder, Malignant melanoma nos, Pacemaker, Photosensitive contact dermatitis, Psoriasis, Retinal detachment, Senile macular degeneration, Squamous cell carcinoma, Strabismus, Type II or unspecified type diabetes mellitus without mention of complication, not stated as uncontrolled, Unspecified asthma(493.90), Urticaria, or Uveitis.  Patient Active Problem List   Diagnosis     Prostate cancer (H)     Hyperlipidemia LDL goal <130     Benign hypertension with chronic kidney disease, stage III (H)     KESHA (obstructive sleep apnea)     Gout     Diverticulosis     Perennial allergic rhinitis     GERD (gastroesophageal reflux disease)     Hypothyroidism due to acquired atrophy of thyroid     Erectile dysfunction     Hypertension goal BP (blood pressure) < 140/90     Right inguinal hernia     History of basal cell carcinoma     Advance care planning     Paresthesias/numbness, both feet     Vitamin B12 deficiency (non anemic)     Health Care Home     Actinic keratosis     Obesity, Class I, BMI 30-34.9     Recurrent falls     Cortical cataract of both eyes     Nuclear sclerosis of both eyes     Chronic kidney disease, stage 3 (moderate) (H)     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 (H)     Perennial allergic rhinitis, unspecified allergic rhinitis trigger     Cognitive impairment, mild, so stated     Hyponatremia       Surgical Hx:  has a past surgical history that includes hernia repair, inguinal rt/lt (~1988); exc skin malig 1.1-2cm trunk,arm,leg (8/06); exc skin malig 0.6-1cm face,facial (5/09); exc skin malig 0.5cm or less,facial (5/09); and exc skin malig  1.1-2cm face,facial (10/09).    Medications:   Current Outpatient Medications:      acetaminophen (TYLENOL) 500 MG tablet, Take 2 tablets by mouth 2 times daily as needed for pain (knee)., Disp: , Rfl: 0     ASPIRIN 81 MG OR TABS, 1 tablet daily*, Disp: , Rfl:      Cyanocobalamin 1000 MCG TABS, 1 tablet daily for foot neuropathy (vitamin B12)., Disp: , Rfl:      fluticasone (FLONASE) 50 MCG/ACT spray, USE ONE TO TWO SPRAY(S) IN EACH NOSTRIL ONCE DAILY FOR  ALLERGY  PREVENTION, Disp: 1 Bottle, Rfl: 1     levothyroxine (SYNTHROID/LEVOTHROID) 88 MCG tablet, Take 1 tablet (88 mcg) by mouth daily for thyroid., Disp: 90 tablet, Rfl: 1     lisinopril (PRINIVIL/ZESTRIL) 40 MG tablet, Take 1 tablet (40 mg) by mouth daily for blood pressure., Disp: 90 tablet, Rfl: 1     lovastatin (MEVACOR) 20 MG tablet, Take 1 tablet (20 mg) by mouth every evening for cholesterol., Disp: 90 tablet, Rfl: 1     metoprolol succinate ER (TOPROL-XL) 50 MG 24 hr tablet, Take 1 tablet (50 mg) by mouth every evening for blood pressure., Disp: 90 tablet, Rfl: 1     Multiple Vitamins-Minerals (PRESERVISION AREDS PO), Take 1 capsule by mouth 2 times daily (with meals) (PreserVision AREDS 2), Disp: , Rfl:      omeprazole (PRILOSEC) 20 MG DR capsule, Take 2 capsules (40 mg) by mouth daily for reflux. Take 30-60 minutes before a meal., Disp: 180 capsule, Rfl: 3     polyethylene glycol (MIRALAX) powder, Take 17 g (1 capful) by mouth daily, Disp: 510 g, Rfl: 1     VITAMIN D, CHOLECALCIFEROL, PO, Take 1,000 Units by mouth daily , Disp: , Rfl:     Allergies:   Allergies   Allergen Reactions     Acrylate Copolymer Rash     topical       Social Hx: retired. [employer] vet.   reports that he has never smoked. He has never used smokeless tobacco. He reports that he does not drink alcohol or use drugs.    Family Hx: family history includes Cancer in his father; Myocardial Infarction in his sister; Thyroid Disease in his sister.    REVIEW OF SYSTEMS: 10 point ROS neg  "other than the symptoms noted above in the HPI and PMH. Notables include  CONSTITUTIONAL:NEGATIVE for fever, chills, change in weight  INTEGUMENTARY/SKIN: NEGATIVE for worrisome rashes, moles or lesions  MUSCULOSKELETAL:See HPI above  NEURO: NEGATIVE for weakness, dizziness or paresthesias    PHYSICAL EXAM:  BP (!) 172/80   Resp 16   Ht 1.746 m (5' 8.75\")   Wt 96.2 kg (212 lb)   BMI 31.54 kg/m      GENERAL APPEARANCE: elderly, alert, no distress  SKIN: no suspicious lesions or rashes  NEURO: Normal strength and tone, mentation intact and speech normal  PSYCH:  mentation appears normal and affect normal, not anxious  RESPIRATORY: No increased work of breathing.  HANDS: no clubbing, nail pitting  LYMPH: no palpable popliteal lymphadenopathy.    BILATERAL LOWER EXTREMITIES:  Gait: normal  Alignment: varus  No gross deformities or masses.  Bilateral Quad atrophy, strength weak  Intact sensation deep peroneal nerve, superficial peroneal nerve, med/lat tibial nerve, sural nerve, saphenous nerve  Intact EHL, EDL, TA, FHL, GS, quadriceps hamstrings and hip flexors  Toes warm and well perfused, brisk capillary refill. Palpable 2+ dp pulses.  Bilateral calf soft and nttp or squeeze.  Edema: 1+, Pitting, bilateral.    LEFT KNEE EXAM:    Skin: intact, no ecchymosis or erythema  Squat: 100 %, not limited by pain.     ROM: full extension to 125 flexion  Tight hamstrings on straight leg raise.  Effusion: trace  Tender: medial joint line, pes   nontender to palpation lat joint line, anterior or posterior knee  McMurrays: negative    MCL: stable, and non-painful at both 0 and 30 degrees knee flexion  Varus stress: stable, and non-painful at both 0 and 30 degrees knee flexion  Lachmans: neg, firm endpoint  Posterior Drawer stable  Patellofemoral joint:                Apprehension: negative              Crepitations: mild   Grind: positive.    RIGHT KNEE EXAM:    Skin: intact, no ecchymosis or erythema  Squat: 100 %, not limited " "by pain.     ROM: full extension to 125 flexion  Tight hamstrings on straight leg raise.  Effusion: trace  Tender: medial joint line, pes, anterior   nontender to palpation lateral joint line, posterior knee  McMurrays: negative    MCL: stable, and non-painful at both 0 and 30 degrees knee flexion  Varus stress: stable, and non-painful at both 0 and 30 degrees knee flexion  Lachmans: neg, firm endpoint  Posterior Drawer stable  Patellofemoral joint:                Apprehension: negative              Crepitations: mild-moderate   Grind: positive.    X-RAY:  2 views bilateral knee from 9/10/2019, bilateral sunrise views 9/11/2019 were reviewed in clinic today. On my review, no obvious fractures or dislocations. Mild bilateral medial narrowing, right more than left. Small tricompartmental osteophytes. On sunrise view, near bone on bone medial patello-femoral space right knee.       ASSESSMENT/PLAN: James Lama is a 89 year old male with acute bilateral knee pain, primary osteoarthritis.     * reviewed imaging studies with patient, showing arthritic changes, or wearing of the cartilage in the knee. This can be caused by normal \"wear and tear\" over the years or following prior injury to the knee.  * most arthritis in right knee, under the knee cap. This would coincide with more pain with stairs, etc.    Non-surgical treatment for knee arthritis includes:    * rest, sitting  * Activity modification - avoid impact activities or activities that aggravate symptoms.  * NSAIDS (non-steroidal anti-inflammatory medications; e.g. Aleve, advil, motrin, ibuprofen) - regular use for inflammation ( twice daily or three times daily), with food, as long as no contra-indications Please discuss with primary care doctor if needed  * ice, 15-20 minutes at a time several times a day or as needed.  * Strengthening of quadriceps muscles  * Physical Therapy for strengthening, stretching and range of motion exercises of legs  * Tylenol as " "needed for pain, consider Tylenol arthritis or similar  * Weight loss: Weight loss:  Body mass index is 31.54 kg/m .. weight loss benefits, not only for the current pain symptoms, but also overall health. Recommend a good diet plan that works for the patient, with the assistance of a dietician or primary care doctor as needed. Also, a good, low-impact exercise program for at least 20 minutes per day, 3 times per week, such as exercise bike, elliptical , or pool.  * Exercise: low impact such as stationary bike, elliptical, pool.  * Injections: cortisone versus viscosupplementation (hyaluronic acid, \"rooster comb\", \"gel shots\"); risks and perceived benefits discussed today. Patient elects NOT to proceed. He states the knees are feeling better so will hold off today.  * Bracing: bracing the knee may offer some relief of symptoms when worn and provide some stability.  * over the counter supplements such as glucosamine and chondroitin sulfate may help with joint pain.  * topical ointments may help as well    * return to clinic as needed.          Rico Valladares M.D., M.S.  Dept. of Orthopaedic Surgery  [facility]        Again, thank you for allowing me to participate in the care of your patient.        Sincerely,        Rico Valladares MD    "

## 2019-09-15 RX ORDER — LEVOTHYROXINE SODIUM 100 UG/1
100 TABLET ORAL DAILY
Qty: 90 TABLET | Refills: 1 | Status: SHIPPED | OUTPATIENT
Start: 2019-09-15 | End: 2020-03-16

## 2019-09-19 ENCOUNTER — THERAPY VISIT (OUTPATIENT)
Dept: PHYSICAL THERAPY | Facility: CLINIC | Age: 84
End: 2019-09-19
Payer: COMMERCIAL

## 2019-09-19 ENCOUNTER — OFFICE VISIT (OUTPATIENT)
Dept: OPTOMETRY | Facility: CLINIC | Age: 84
End: 2019-09-19
Payer: COMMERCIAL

## 2019-09-19 DIAGNOSIS — M25.561 ACUTE PAIN OF BOTH KNEES: ICD-10-CM

## 2019-09-19 DIAGNOSIS — M70.61 TROCHANTERIC BURSITIS OF BOTH HIPS: ICD-10-CM

## 2019-09-19 DIAGNOSIS — H25.813 COMBINED FORMS OF AGE-RELATED CATARACT OF BOTH EYES: Primary | ICD-10-CM

## 2019-09-19 DIAGNOSIS — M25.552 BILATERAL HIP PAIN: ICD-10-CM

## 2019-09-19 DIAGNOSIS — H04.123 DRY EYE SYNDROME OF BOTH EYES: ICD-10-CM

## 2019-09-19 DIAGNOSIS — H52.4 PRESBYOPIA: ICD-10-CM

## 2019-09-19 DIAGNOSIS — G89.29 CHRONIC PAIN OF BOTH KNEES: ICD-10-CM

## 2019-09-19 DIAGNOSIS — M54.50 CHRONIC BILATERAL LOW BACK PAIN WITHOUT SCIATICA: ICD-10-CM

## 2019-09-19 DIAGNOSIS — H02.839 DERMATOCHALASIS OF EYELID: ICD-10-CM

## 2019-09-19 DIAGNOSIS — M25.551 BILATERAL HIP PAIN: ICD-10-CM

## 2019-09-19 DIAGNOSIS — M70.62 TROCHANTERIC BURSITIS OF BOTH HIPS: ICD-10-CM

## 2019-09-19 DIAGNOSIS — M25.562 ACUTE PAIN OF BOTH KNEES: ICD-10-CM

## 2019-09-19 DIAGNOSIS — G89.29 CHRONIC BILATERAL LOW BACK PAIN WITHOUT SCIATICA: ICD-10-CM

## 2019-09-19 DIAGNOSIS — M25.561 CHRONIC PAIN OF BOTH KNEES: ICD-10-CM

## 2019-09-19 DIAGNOSIS — M25.562 CHRONIC PAIN OF BOTH KNEES: ICD-10-CM

## 2019-09-19 PROCEDURE — 92004 COMPRE OPH EXAM NEW PT 1/>: CPT | Performed by: OPTOMETRIST

## 2019-09-19 PROCEDURE — 97161 PT EVAL LOW COMPLEX 20 MIN: CPT | Mod: GP | Performed by: PHYSICAL THERAPIST

## 2019-09-19 PROCEDURE — 97110 THERAPEUTIC EXERCISES: CPT | Mod: GP | Performed by: PHYSICAL THERAPIST

## 2019-09-19 PROCEDURE — 92015 DETERMINE REFRACTIVE STATE: CPT | Performed by: OPTOMETRIST

## 2019-09-19 PROCEDURE — 97112 NEUROMUSCULAR REEDUCATION: CPT | Mod: GP | Performed by: PHYSICAL THERAPIST

## 2019-09-19 ASSESSMENT — REFRACTION_MANIFEST
OD_CYLINDER: +1.00
OD_ADD: +2.75
OD_AXIS: 005
OS_CYLINDER: +0.75
OS_SPHERE: +0.50
OS_AXIS: 015
OD_SPHERE: -0.25
OS_ADD: +2.75

## 2019-09-19 ASSESSMENT — ACTIVITIES OF DAILY LIVING (ADL)
STAND: ACTIVITY IS MINIMALLY DIFFICULT
HOS_ADL_HIGHEST_POTENTIAL_SCORE: 68
WEAKNESS: I HAVE THE SYMPTOM BUT IT DOES NOT AFFECT MY ACTIVITY
GETTING_INTO_AND_OUT_OF_AN_AVERAGE_CAR: SLIGHT DIFFICULTY
WALKING_APPROXIMATELY_10_MINUTES: SLIGHT DIFFICULTY
STEPPING_UP_AND_DOWN_CURBS: SLIGHT DIFFICULTY
ROLLING_OVER_IN_BED: SLIGHT DIFFICULTY
GOING_UP_1_FLIGHT_OF_STAIRS: SLIGHT DIFFICULTY
HOS_ADL_ITEM_SCORE_TOTAL: 44
GETTING_INTO_AND_OUT_OF_A_BATHTUB: SLIGHT DIFFICULTY
PUTTING_ON_SOCKS_AND_SHOES: SLIGHT DIFFICULTY
WALKING_15_MINUTES_OR_GREATER: MODERATE DIFFICULTY
HOW_WOULD_YOU_RATE_THE_OVERALL_FUNCTION_OF_YOUR_KNEE_DURING_YOUR_USUAL_DAILY_ACTIVITIES?: ABNORMAL
KNEE_ACTIVITY_OF_DAILY_LIVING_SCORE: 70
STIFFNESS: I HAVE THE SYMPTOM BUT IT DOES NOT AFFECT MY ACTIVITY
RISE FROM A CHAIR: ACTIVITY IS MINIMALLY DIFFICULT
PAIN: THE SYMPTOM AFFECTS MY ACTIVITY SLIGHTLY
HOW_WOULD_YOU_RATE_THE_CURRENT_FUNCTION_OF_YOUR_KNEE_DURING_YOUR_USUAL_DAILY_ACTIVITIES_ON_A_SCALE_FROM_0_TO_100_WITH_100_BEING_YOUR_LEVEL_OF_KNEE_FUNCTION_PRIOR_TO_YOUR_INJURY_AND_0_BEING_THE_INABILITY_TO_PERFORM_ANY_OF_YOUR_USUAL_DAILY_ACTIVITIES?: 75
SWELLING: I HAVE THE SYMPTOM BUT IT DOES NOT AFFECT MY ACTIVITY
GIVING WAY, BUCKLING OR SHIFTING OF KNEE: I HAVE THE SYMPTOM BUT IT DOES NOT AFFECT MY ACTIVITY
GO UP STAIRS: ACTIVITY IS SOMEWHAT DIFFICULT
AS_A_RESULT_OF_YOUR_KNEE_INJURY,_HOW_WOULD_YOU_RATE_YOUR_CURRENT_LEVEL_OF_DAILY_ACTIVITY?: ABNORMAL
WALK: ACTIVITY IS SOMEWHAT DIFFICULT
GO DOWN STAIRS: ACTIVITY IS FAIRLY DIFFICULT
LIMPING: I HAVE THE SYMPTOM BUT IT DOES NOT AFFECT MY ACTIVITY
WALKING_UP_STEEP_HILLS: MODERATE DIFFICULTY
HOS_ADL_COUNT: 17
RECREATIONAL_ACTIVITIES: MODERATE DIFFICULTY
HEAVY_WORK: MODERATE DIFFICULTY
DEEP_SQUATTING: MODERATE DIFFICULTY
GOING_DOWN_1_FLIGHT_OF_STAIRS: MODERATE DIFFICULTY
LIGHT_TO_MODERATE_WORK: SLIGHT DIFFICULTY
KNEEL ON THE FRONT OF YOUR KNEE: ACTIVITY IS SOMEWHAT DIFFICULT
TWISTING/PIVOTING_ON_INVOLVED_LEG: MODERATE DIFFICULTY
STANDING_FOR_15_MINUTES: SLIGHT DIFFICULTY
HOW_WOULD_YOU_RATE_YOUR_CURRENT_LEVEL_OF_FUNCTION_DURING_YOUR_USUAL_ACTIVITIES_OF_DAILY_LIVING_FROM_0_TO_100_WITH_100_BEING_YOUR_LEVEL_OF_FUNCTION_PRIOR_TO_YOUR_HIP_PROBLEM_AND_0_BEING_THE_INABILITY_TO_PERFORM_ANY_OF_YOUR_USUAL_DAILY_ACTIVITIES?: 75
WALKING_DOWN_STEEP_HILLS: SLIGHT DIFFICULTY
RAW_SCORE: 49
SIT WITH YOUR KNEE BENT: ACTIVITY IS MINIMALLY DIFFICULT
HOS_ADL_SCORE(%): 64.71
KNEE_ACTIVITY_OF_DAILY_LIVING_SUM: 49
SQUAT: ACTIVITY IS SOMEWHAT DIFFICULT
WALKING_INITIALLY: SLIGHT DIFFICULTY
SITTING_FOR_15_MINUTES: SLIGHT DIFFICULTY

## 2019-09-19 ASSESSMENT — REFRACTION_WEARINGRX
OD_CYLINDER: +1.25
OS_SPHERE: +0.50
OD_ADD: +2.25
OS_CYLINDER: +0.50
OD_SPHERE: -0.25
OS_ADD: +2.25
OD_AXIS: 005
OS_AXIS: 15

## 2019-09-19 ASSESSMENT — EXTERNAL EXAM - RIGHT EYE: OD_EXAM: NORMAL

## 2019-09-19 ASSESSMENT — TONOMETRY
OD_IOP_MMHG: 21
IOP_METHOD: TONOPEN
OS_IOP_MMHG: 19

## 2019-09-19 ASSESSMENT — CONF VISUAL FIELD
OD_NORMAL: 1
OS_NORMAL: 1
METHOD: COUNTING FINGERS

## 2019-09-19 ASSESSMENT — VISUAL ACUITY
OS_SC: 20/70-1
OD_PH_SC: 20/40
OS_PH_SC+: -2
OD_SC+: -1
OD_SC: 20/40
OS_SC+: -1
OS_SC: 20/40
OS_PH_SC: 20/30
OD_PH_SC+: -2
OD_SC: 20/60
METHOD: SNELLEN - LINEAR

## 2019-09-19 ASSESSMENT — KERATOMETRY
OD_K2POWER_DIOPTERS: 47.00
OS_K2POWER_DIOPTERS: 46.00
OD_K1POWER_DIOPTERS: 46.00
OS_K1POWER_DIOPTERS: 45.50
OD_AXISANGLE2_DEGREES: 132
OS_AXISANGLE2_DEGREES: 47

## 2019-09-19 ASSESSMENT — EXTERNAL EXAM - LEFT EYE: OS_EXAM: NORMAL

## 2019-09-19 ASSESSMENT — CUP TO DISC RATIO
OS_RATIO: 0.2
OD_RATIO: 0.2

## 2019-09-19 NOTE — PROGRESS NOTES
[facility] Initial Evaluation  Subjective:  The history is provided by the patient. History limited by: pts wife here and helped also. No  was used.   James Lama being seen for back, Bilateral knee and hip pain.   Problem began 9/9/2019. Problem occurred: unknown  and reported as 3/10 (average pain) on pain scale. General health as reported by patient is good. Pertinent medical history includes:  Cancer, high blood pressure, osteoarthritis, overweight and thyroid problems.  Medical allergies: see epic.  Surgeries include:  Cancer surgery. Other surgery history details: Prostate.  Current medications:  Pain medication, high blood pressure medication and thyroid medication.   Primary job tasks include:  Repetitive tasks.  Pain is described as aching and is intermittent. Pain is worse during the night. Since onset symptoms are gradually improving. Special tests:  X-ray (knees).     Patient is Retired- loves woodworking.   Barriers include:  Stairs.  Red flags:  None as reported by patient.  Type of problem:  Lumbar   Condition occurred with:  Insidious onset. This is a chronic condition    Patient reports pain:  Central lumbar spine.  Associated symptoms:  Loss of motion/stiffness. Symptoms are exacerbated by bending, walking and standing and relieved by rest.    Type of problem:  Bilateral hips   Condition occurred with:  Insidious onset. This is a chronic condition    Patient reports pain:  Joint.  Associated symptoms:  Loss of motion/stiffness and loss of strength. Symptoms are exacerbated by descending stairs, ascending stairs, bending/squatting, transfers, standing and walking and relieved by rest.    Type of problem:  Bilateral knees   Condition occurred with:  Insidious onset. This is a chronic condition    Patient reports pain:  In the joint.  Associated symptoms:  Buckling/giving out, loss of motion/stiffness and loss of strength. Symptoms are exacerbated by  bending/squatting, activity, ascending stairs, descending stairs, transfers, standing and walking and relieved by rest and ice.                      Objective:  Standing Alignment:    Cervical/Thoracic:  Forward head, Dowager's hump and thoracic kyphosis increased  Shoulder/UE:  Rounded shoulders  Lumbar:  Lordosis decr                           Lumbar/SI Evaluation  ROM:    AROM Lumbar:   Flexion:          Knee level  Ext:                    Mod loss   Side Bend:        Left:  Mid thigh    Right:  Mid thigh  Rotation:           Left:     Right:   Side Glide:        Left:     Right:                                                               Hip Evaluation  Hip PROM:  Hip PROM:  Left Hip:    Normal  Right Hip:  Normal                          Hip Strength:    Flexion:   Left: 4/5   Pain:  Right: 4/5   Pain:                      Abduction:  Left: 4-/5     Pain:Right: 4-/5    Pain:                           Knee Evaluation:  ROM:    AROM      Extension:  Left: 2    Right:  3  Flexion: Left: 116    Right: 114        Strength:     Extension:  Left: 4/5   Pain:      Right: 4/5   Pain:  Flexion:  Left: 4/5   Pain:      Right: 4/5   Pain:                        General     ROS    Assessment/Plan:    Patient is a 89 year old male with lumbar, both sides hip and both sides knee complaints.    Patient has the following significant findings with corresponding treatment plan.                Diagnosis 1:  Back, Dave knee, and Dave hip pain  Pain -  hot/cold therapy and manual therapy  Decreased ROM/flexibility - manual therapy, therapeutic exercise and therapeutic activity  Impaired muscle performance - neuro re-education  Decreased function - therapeutic activities and home program  Impaired posture - neuro re-education and therapeutic activities    Therapy Evaluation Codes:   1) History comprised of:   Personal factors that impact the plan of care:      Age and Past/current experiences.    Comorbidity factors that impact the  plan of care are:      Cancer, High blood pressure, Osteoarthritis and thyroids.     Medications impacting care: Anti-inflammatory, High blood pressure and Pain.  2) Examination of Body Systems comprised of:   Body structures and functions that impact the plan of care:      Hip, Knee and Lumbar spine.   Activity limitations that impact the plan of care are:      Bending, Lifting, Squatting/kneeling, Stairs, Standing and Walking.  3) Clinical presentation characteristics are:   Stable/Uncomplicated.  4) Decision-Making    Low complexity using standardized patient assessment instrument and/or measureable assessment of functional outcome.  Cumulative Therapy Evaluation is: Low complexity.    Previous and current functional limitations:  (See Goal Flow Sheet for this information)    Short term and Long term goals: (See Goal Flow Sheet for this information)     Communication ability:  Patient appears to be able to clearly communicate and understand verbal and written communication and follow directions correctly.  Treatment Explanation - The following has been discussed with the patient:   RX ordered/plan of care  Anticipated outcomes  Possible risks and side effects  This patient would benefit from PT intervention to resume normal activities.   Rehab potential is good.    Frequency:  1 X week, once daily  Duration:  for 8 weeks  Discharge Plan:  Achieve all LTG.  Independent in home treatment program.  Reach maximal therapeutic benefit.    Please refer to the daily flowsheet for treatment today, total treatment time and time spent performing 1:1 timed codes.

## 2019-09-19 NOTE — PROGRESS NOTES
Chief Complaint   Patient presents with     Annual Eye Exam      Accompanied by wife    Last Eye Exam: 8/5/2015  Dilated Previously: Yes    What are you currently using to see?  Readers, wears them for near tasks, did not bring them to this appointment. He thinks that he has the strongest ones that he could get in the drugstore.        Distance Vision Acuity: Satisfied with vision, thinks that he sees ok in the distance. Only complaint is that his eyes water when he drives     Near Vision Acuity: Satisfied with vision while reading  with readers    Eye Comfort: good and watery  Do you use eye drops? : No  Occupation or Hobbies: Retired     [employer] Assistant           Medical, surgical and family histories reviewed and updated 9/19/2019.       OBJECTIVE: See Ophthalmology exam    ASSESSMENT:    ICD-10-CM    1. Combined forms of age-related cataract of both eyes H25.813 EYE EXAM (SIMPLE-NONBILLABLE)   2. Dry eye syndrome of both eyes H04.123 EYE EXAM (SIMPLE-NONBILLABLE)   3. Dermatochalasis of eyelid H02.839 EYE EXAM (SIMPLE-NONBILLABLE)   4. Presbyopia H52.4 REFRACTION      PLAN:     Patient Instructions   You have cataracts which are affecting your vision.  A change in glasses will not give you much improvement.  A cataract evaluation can be scheduled with the eye surgeon to discuss with you the option of cataract surgery.     Refresh tears 1 drop 4x daily.    Referral to Dr. Maulik Winslow at the [facility] [place] for tearing evaluation- [phone] if drops do not help tearing after 6 weeks.    Eyeglass prescription given.       Anson Adams, OD

## 2019-09-19 NOTE — LETTER
9/19/2019         RE: James Lama  [address]  [facility] [address]        Dear Colleague,    Thank you for referring your patient, James Lama, to the [facility]. Please see a copy of my visit note below.    Chief Complaint   Patient presents with     Annual Eye Exam      Accompanied by wife    Last Eye Exam: 8/5/2015  Dilated Previously: Yes    What are you currently using to see?  Readers, wears them for near tasks, did not bring them to this appointment. He thinks that he has the strongest ones that he could get in the drugstore.        Distance Vision Acuity: Satisfied with vision, thinks that he sees ok in the distance. Only complaint is that his eyes water when he drives     Near Vision Acuity: Satisfied with vision while reading  with readers    Eye Comfort: good and watery  Do you use eye drops? 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       OBJECTIVE: See Ophthalmology exam    ASSESSMENT:    ICD-10-CM    1. Combined forms of age-related cataract of both eyes H25.813 EYE EXAM (SIMPLE-NONBILLABLE)   2. Dry eye syndrome of both eyes H04.123 EYE EXAM (SIMPLE-NONBILLABLE)   3. Dermatochalasis of eyelid H02.839 EYE EXAM (SIMPLE-NONBILLABLE)   4. Presbyopia H52.4 REFRACTION      PLAN:     Patient Instructions   You have cataracts which are affecting your vision.  A change in glasses will not give you much improvement.  A cataract evaluation can be scheduled with the eye surgeon to discuss with you the option of cataract surgery.     Refresh tears 1 drop 4x daily.    Referral to Dr. Maulik Winslow at the [facility] for tearing evaluation- [phone] if drops do not help tearing after 6 weeks.    Eyeglass prescription given.       Anson Adams, OD             Again, thank you for allowing me to participate in the care of your patient.         Sincerely,        Anson Adams, OD

## 2019-09-19 NOTE — PATIENT INSTRUCTIONS
You have cataracts which are affecting your vision.  A change in glasses will not give you much improvement.  A cataract evaluation can be scheduled with the eye surgeon to discuss with you the option of cataract surgery.     Refresh tears 1 drop 4x daily.    Referral to Dr. Maulik Winslow at the [facility] for tearing evaluation- [phone] if drops do not help tearing after 6 weeks.    Eyeglass prescription given.       Anson Adams, OD    The affects of the dilating drops last for 4- 6 hours.  You will be more sensitive to light and vision will be blurry up close.  Mydriatic sunglasses were given if needed.    Use one drop of artificial tears both eyes 3-4 x daily.  Continue to use the drops regardless if your eyes are comfortable.  Artificial tears work best as a preventative and not as well after your eyes are starting to bother you.  It may take 4- 6 weeks of using the drops before you notice improvement.  If after that time you are still having problems schedule an appointment for an evaluation and discussion of different treatments such as Restasis or Xiidra.  Dry eyes are a chronic condition and you may have more symptoms at certain times of the year.    Excess tearing can be due to the right tears not working properly or a blockage in the tear drainage system.  You can try using artificial tears 1 drop right eye 3-4 x day.  If the excess tearing is bothersome after 3-4 weeks and that doesn't help we can send you for further testing on the puncta which allows the tears to drain.  This would entail a referral to our oculo plastic specialist at the [facility].    Brands of drops that are recommended are:    Systane Complete  Systane Ultra  Systane Balance  Refresh Advanced Optive  Blink    If you are using drops more than 4 x day or have sensitivities to preservatives I recommend non preserved artificial tears.  These come in 1 use vials.  They can be used every 1-2 hours.    Brands of  drops that are recommended are:    Systane- preservative free  Refresh-  preservative free  Blink- preservative free    Gels or ointment can be used at night.    Brands recommended are:    Systane Gel  Refresh Gel  Blink Gel  Genteal Gel    Systane night time (ointment)  Refresh Celluvisc  Refresh PM (ointment)      Visine, Clear Eyes or Murine (drops that get the red out) can irritate the eyes and cause a rebound effect where the eyes become more red and you end up using more drops.  Avoid drops containing tetrahydrozoline, naphazoline, phenylephrine, oxymetazoline.      OTC Lumify is a newer product that gives immediate redness relief with out the rebound effect.  Use as needed to take the redness out.    Artificial tears may be used with other drops (such as allergy, glaucoma, antibiotics) around the same time.  Be sure to wait 5 minutes in between drops.    Heat to the eyelids can also improve your symptoms of dry eyes.  Shahram heat masks can be purchased at [employer] to be used daily for 10-15 minutes.  Other options are gel masks that can be put in the microwave and purchased at most pharmacies.

## 2019-09-25 ENCOUNTER — THERAPY VISIT (OUTPATIENT)
Dept: PHYSICAL THERAPY | Facility: CLINIC | Age: 84
End: 2019-09-25
Payer: COMMERCIAL

## 2019-09-25 DIAGNOSIS — M25.562 CHRONIC PAIN OF BOTH KNEES: ICD-10-CM

## 2019-09-25 DIAGNOSIS — M25.551 BILATERAL HIP PAIN: ICD-10-CM

## 2019-09-25 DIAGNOSIS — G89.29 CHRONIC PAIN OF BOTH KNEES: ICD-10-CM

## 2019-09-25 DIAGNOSIS — G89.29 CHRONIC BILATERAL LOW BACK PAIN WITHOUT SCIATICA: ICD-10-CM

## 2019-09-25 DIAGNOSIS — M25.552 BILATERAL HIP PAIN: ICD-10-CM

## 2019-09-25 DIAGNOSIS — M25.561 CHRONIC PAIN OF BOTH KNEES: ICD-10-CM

## 2019-09-25 DIAGNOSIS — M54.50 CHRONIC BILATERAL LOW BACK PAIN WITHOUT SCIATICA: ICD-10-CM

## 2019-09-25 PROCEDURE — 97110 THERAPEUTIC EXERCISES: CPT | Mod: GP | Performed by: PHYSICAL THERAPIST

## 2019-09-25 PROCEDURE — 97112 NEUROMUSCULAR REEDUCATION: CPT | Mod: GP | Performed by: PHYSICAL THERAPIST

## 2019-10-03 ENCOUNTER — THERAPY VISIT (OUTPATIENT)
Dept: PHYSICAL THERAPY | Facility: CLINIC | Age: 84
End: 2019-10-03
Payer: COMMERCIAL

## 2019-10-03 DIAGNOSIS — M54.50 CHRONIC BILATERAL LOW BACK PAIN WITHOUT SCIATICA: ICD-10-CM

## 2019-10-03 DIAGNOSIS — M25.561 CHRONIC PAIN OF BOTH KNEES: ICD-10-CM

## 2019-10-03 DIAGNOSIS — G89.29 CHRONIC PAIN OF BOTH KNEES: ICD-10-CM

## 2019-10-03 DIAGNOSIS — M25.551 BILATERAL HIP PAIN: ICD-10-CM

## 2019-10-03 DIAGNOSIS — M25.552 BILATERAL HIP PAIN: ICD-10-CM

## 2019-10-03 DIAGNOSIS — G89.29 CHRONIC BILATERAL LOW BACK PAIN WITHOUT SCIATICA: ICD-10-CM

## 2019-10-03 DIAGNOSIS — M25.562 CHRONIC PAIN OF BOTH KNEES: ICD-10-CM

## 2019-10-03 PROCEDURE — 97110 THERAPEUTIC EXERCISES: CPT | Mod: GP | Performed by: PHYSICAL THERAPIST

## 2019-10-03 PROCEDURE — 97112 NEUROMUSCULAR REEDUCATION: CPT | Mod: GP | Performed by: PHYSICAL THERAPIST

## 2019-10-17 ENCOUNTER — THERAPY VISIT (OUTPATIENT)
Dept: PHYSICAL THERAPY | Facility: CLINIC | Age: 84
End: 2019-10-17
Payer: COMMERCIAL

## 2019-10-17 DIAGNOSIS — G89.29 CHRONIC BILATERAL LOW BACK PAIN WITHOUT SCIATICA: ICD-10-CM

## 2019-10-17 DIAGNOSIS — M25.551 BILATERAL HIP PAIN: ICD-10-CM

## 2019-10-17 DIAGNOSIS — M54.50 CHRONIC BILATERAL LOW BACK PAIN WITHOUT SCIATICA: ICD-10-CM

## 2019-10-17 DIAGNOSIS — M25.552 BILATERAL HIP PAIN: ICD-10-CM

## 2019-10-17 DIAGNOSIS — G89.29 CHRONIC PAIN OF BOTH KNEES: ICD-10-CM

## 2019-10-17 DIAGNOSIS — M25.562 CHRONIC PAIN OF BOTH KNEES: ICD-10-CM

## 2019-10-17 DIAGNOSIS — M25.561 CHRONIC PAIN OF BOTH KNEES: ICD-10-CM

## 2019-10-17 PROCEDURE — 97110 THERAPEUTIC EXERCISES: CPT | Mod: GP | Performed by: PHYSICAL THERAPIST

## 2019-10-17 PROCEDURE — 97112 NEUROMUSCULAR REEDUCATION: CPT | Mod: GP | Performed by: PHYSICAL THERAPIST

## 2019-12-05 ENCOUNTER — OFFICE VISIT (OUTPATIENT)
Dept: FAMILY MEDICINE | Facility: CLINIC | Age: 84
End: 2019-12-05
Payer: COMMERCIAL

## 2019-12-05 VITALS
SYSTOLIC BLOOD PRESSURE: 120 MMHG | TEMPERATURE: 97.7 F | RESPIRATION RATE: 18 BRPM | OXYGEN SATURATION: 95 % | HEIGHT: 69 IN | DIASTOLIC BLOOD PRESSURE: 56 MMHG | WEIGHT: 211 LBS | BODY MASS INDEX: 31.25 KG/M2 | HEART RATE: 61 BPM

## 2019-12-05 DIAGNOSIS — L30.9 DERMATITIS OF EXTERNAL EAR: ICD-10-CM

## 2019-12-05 PROCEDURE — 99213 OFFICE O/P EST LOW 20 MIN: CPT | Performed by: FAMILY MEDICINE

## 2019-12-05 RX ORDER — CLOBETASOL PROPIONATE 0.5 MG/ML
SOLUTION TOPICAL
Qty: 50 ML | Refills: 0 | Status: SHIPPED | OUTPATIENT
Start: 2019-12-05

## 2019-12-05 RX ORDER — DOXYCYCLINE HYCLATE 100 MG
100 TABLET ORAL 2 TIMES DAILY
Qty: 14 TABLET | Refills: 0 | Status: SHIPPED | OUTPATIENT
Start: 2019-12-05 | End: 2019-12-12

## 2019-12-05 ASSESSMENT — MIFFLIN-ST. JEOR: SCORE: 1593.5

## 2019-12-05 ASSESSMENT — PAIN SCALES - GENERAL: PAINLEVEL: MODERATE PAIN (4)

## 2019-12-05 NOTE — PROGRESS NOTES
"Subjective     James Lama is a 89 year old male who presents to clinic today for the following health issues. He is accompanied by his wife.:    HPI   ENT Symptoms             Symptoms: cc Present Absent Comment   Fever/Chills   x    Fatigue   x    Muscle Aches   x    Eye Irritation   x    Sneezing   x    Nasal Sergio/Drg   x    Sinus Pressure/Pain   x    Loss of smell   x    Dental pain   x    Sore Throat   x    Swollen Glands   x    Ear Pain/Fullness  x  Soreness pain, swelling right ear, check left ear also. Patient has hearing aids   Cough   x    Wheeze   x    Chest Pain   x    Shortness of breath   x    Rash   x    Other   x      Symptom duration:  1 week   Symptom severity:  severe   Treatments tried:  none - Patient states received ear drops in the past for similar symptom and helped symptoms go away for a while   Contacts:  none     Past medical, family, and social histories, medications, and allergies are reviewed and updated in [facility].    Review of Systems   ROS COMP: Constitutional, HEENT, cardiovascular, pulmonary, gi and gu systems are negative, except as otherwise noted.      This document serves as a record of the services and decisions personally performed and made by Dr. Jansen. It was created on his behalf by Viktoriya Pastor, a trained medical scribe. The creation of this document is based the provider's statements to the medical scribe.  Viktoriya Pastor,  11:39 AM     Objective    /56 (BP Location: Right arm, Patient Position: Chair, Cuff Size: Adult Large)   Pulse 61   Temp 97.7  F (36.5  C) (Oral)   Resp 18   Ht 1.746 m (5' 8.75\")   Wt 95.7 kg (211 lb)   SpO2 95%   BMI 31.39 kg/m    Body mass index is 31.39 kg/m .     Physical Exam       GENERAL: healthy, alert and no distress  EYES: Eyes grossly normal to inspection, PERRL, EOMI, sclerae white and conjunctivae normal  HENT: right TM normal, left TM normal. The right pinna has mild diffuse erythema, appears slightly swollen compared to " the left, and is perhaps a little warmer to touch as well.  MS: no gross musculoskeletal defects noted, no edema  SKIN: no suspicious lesions or rashes to visible skin  NEURO: Normal strength and tone, sensory exam grossly normal, mentation intact, oriented times 3 and cranial nerves 2-12 intact  PSYCH: mentation appears normal, affect normal/bright      Assessment & Plan     (L30.9) Dermatitis of external ear  Comment: rule-out cellulitis  Plan: clobetasol (TEMOVATE) 0.05 % external solution,        doxycycline hyclate (VIBRA-TABS) 100 MG tablet   Follow-up if symptoms not significantly improved by the time the antibiotic is completed.          Return in about 3 months (around 3/9/2020) for full physical, lab tests, recheck medications.        The information in this document, created by the medical scribe for me, accurately reflects the services I personally performed and the decisions made by me. I have reviewed and approved this document for accuracy prior to leaving the patient care area.  Juan Carlos Jansen MD

## 2019-12-05 NOTE — PATIENT INSTRUCTIONS
At [facility], we strive to deliver an exceptional experience to you, every time we see you.  If you receive a survey in the mail, please send us back your thoughts. We really do value your feedback.    Based on your medical history, these are the current health maintenance/preventive care services that you are due for (some may have been done at this visit.)  Health Maintenance Due   Topic Date Due     ZOSTER IMMUNIZATION (2 of 3) 06/21/2012     MEDICARE ANNUAL WELLNESS VISIT  03/08/2019     FALL RISK ASSESSMENT  03/08/2019         Suggested websites for health information:  Www.[employer].org : Up to date and easily searchable information on multiple topics.  Www.medlineplus.gov : medication info, interactive tutorials, watch real surgeries online  Www.familydoctor.org : good info from the Academy of Family Physicians  Www.cdc.gov : public health info, travel advisories, epidemics (H1N1)  Www.aap.org : children's health info, normal development, vaccinations  Www.health.[facility].[place].us : [place] dept of health, public health issues in [place], N1N1    Your care team:                            Family Medicine Internal Medicine   MD Trino Lombardo MD Shantel Branch-Fleming, MD Katya Georgiev PA-C Nam Ho, MD Pediatrics   WERO Martinez, MD Chelo Sharma CNP, MD Deborah Mielke, MD Kim Thein, APRN CNP      Clinic hours: Monday - Thursday 7 am-7 pm; Fridays 7 am-5 pm.   Urgent care: Monday - Friday 11 am-9 pm; Saturday and Sunday 9 am-5 pm.  Pharmacy : Monday -Thursday 8 am-8 pm; Friday 8 am-6 pm; Saturday and Sunday 9 am-5 pm.     Clinic: [phone]   Pharmacy: [phone]

## 2019-12-11 PROBLEM — G89.29 CHRONIC BILATERAL LOW BACK PAIN WITHOUT SCIATICA: Status: RESOLVED | Noted: 2019-09-19 | Resolved: 2019-12-11

## 2019-12-11 PROBLEM — M25.552 BILATERAL HIP PAIN: Status: RESOLVED | Noted: 2019-09-19 | Resolved: 2019-12-11

## 2019-12-11 PROBLEM — M54.50 CHRONIC BILATERAL LOW BACK PAIN WITHOUT SCIATICA: Status: RESOLVED | Noted: 2019-09-19 | Resolved: 2019-12-11

## 2019-12-11 PROBLEM — M25.561 CHRONIC PAIN OF BOTH KNEES: Status: RESOLVED | Noted: 2019-09-19 | Resolved: 2019-12-11

## 2019-12-11 PROBLEM — M25.551 BILATERAL HIP PAIN: Status: RESOLVED | Noted: 2019-09-19 | Resolved: 2019-12-11

## 2019-12-11 PROBLEM — G89.29 CHRONIC PAIN OF BOTH KNEES: Status: RESOLVED | Noted: 2019-09-19 | Resolved: 2019-12-11

## 2019-12-11 PROBLEM — M25.562 CHRONIC PAIN OF BOTH KNEES: Status: RESOLVED | Noted: 2019-09-19 | Resolved: 2019-12-11

## 2020-02-05 DIAGNOSIS — J30.89 PERENNIAL ALLERGIC RHINITIS: ICD-10-CM

## 2020-02-05 RX ORDER — FLUTICASONE PROPIONATE 50 MCG
SPRAY, SUSPENSION (ML) NASAL
Qty: 16 G | Refills: 2 | Status: SHIPPED | OUTPATIENT
Start: 2020-02-05

## 2020-02-05 NOTE — TELEPHONE ENCOUNTER
"Requested Prescriptions   Pending Prescriptions Disp Refills     fluticasone (FLONASE) 50 MCG/ACT nasal spray [Pharmacy Med Name: Fluticasone Propionate 50 MCG/ACT Nasal Suspension] 16 g 0     Sig: USE 1 TO 2 SPRAY(S) IN EACH NOSTRIL ONCE DAILY FOR ALLERGY PREVENTION.         Last Written Prescription Date:  2/22/18  Last Fill Quantity: 1,  # refills: 1   Last Office Visit with [facility], [facility] or [facility] prescribing provider:  12/5/19   Future Office Visit:    Next 5 appointments (look out 90 days)    Mar 17, 2020 10:45 AM CDT  Return Visit with Catia Loredo MD  [facility] ([facility]) [address] [place] [id number] [phone]             Inhaled Steroids Protocol Passed - 2/5/2020 12:19 PM        Passed - Patient is age 12 or older        Passed - Recent (12 mo) or future (30 days) visit within the authorizing provider's specialty     Patient has had an office visit with the authorizing provider or a provider within the authorizing providers department within the previous 12 mos or has a future within next 30 days. See \"Patient Info\" tab in inbasket, or \"Choose Columns\" in Meds & Orders section of the refill encounter.              Passed - Medication is active on med list              Micky Faarax  [facility]  "

## 2020-02-05 NOTE — TELEPHONE ENCOUNTER
Prescription approved per [facility] Refill Protocol.      Yulisa Garza RN, BSN, PHN

## 2020-03-12 DIAGNOSIS — I12.9 BENIGN HYPERTENSION WITH CHRONIC KIDNEY DISEASE, STAGE III (H): ICD-10-CM

## 2020-03-12 DIAGNOSIS — E03.4 HYPOTHYROIDISM DUE TO ACQUIRED ATROPHY OF THYROID: ICD-10-CM

## 2020-03-12 DIAGNOSIS — N18.30 BENIGN HYPERTENSION WITH CHRONIC KIDNEY DISEASE, STAGE III (H): ICD-10-CM

## 2020-03-12 NOTE — TELEPHONE ENCOUNTER
"Requested Prescriptions   Pending Prescriptions Disp Refills     lisinopril (ZESTRIL) 40 MG tablet [Pharmacy Med Name: Lisinopril 40 MG Oral Tablet]  Last Written Prescription Date:  09/09/19  Last Fill Quantity: 90,  # refills: 1   Last Office Visit with CATHERINE, RADHAMES or [facility] prescribing provider:   12/05/19Radha  Future Office Visit:    Next 5 appointments (look out 90 days)    Mar 17, 2020 10:45 AM CDT  Return Visit with Catia [facility]o MD  [facility] [address] [place] [id number] [phone]   Mar 19, 2020  9:00 AM CDT  PHYSICAL with Juan Carlos Jansen MD  [facility] [address] [facility] [id number] [phone]        90 tablet 0     Sig: Take 1 tablet by mouth once daily for blood pressure       ACE Inhibitors (Including Combos) Protocol Passed - 3/12/2020 12:59 PM        Passed - Blood pressure under 140/90 in past 12 months     BP Readings from Last 3 Encounters:   12/05/19 120/56   09/11/19 (!) 172/80   09/09/19 (!) 181/79                 Passed - Recent (12 mo) or future (30 days) visit within the authorizing provider's specialty     Patient has had an office visit with the authorizing provider or a provider within the authorizing providers department within the previous 12 mos or has a future within next 30 days. See \"Patient Info\" tab in inbasket, or \"Choose Columns\" in Meds & Orders section of the refill encounter.              Passed - Medication is active on med list        Passed - Patient is age 18 or older        Passed - Normal serum creatinine on file in past 12 months     Recent Labs   Lab Test 09/09/19  1430   CR 1.18       Ok to refill medication if creatinine is low          Passed - Normal serum potassium on file in past 12 months     Recent Labs   Lab Test 09/09/19  1430   POTASSIUM 4.8                levothyroxine (SYNTHROID/LEVOTHROID) 100 MCG tablet " "[Pharmacy Med Name: Levothyroxine Sodium 100 MCG Oral Tablet]  Last Written Prescription Date:  09/15/19  Last Fill Quantity: 90,  # refills: 1   Last Office Visit with CATHERINE, RADHAMES or [facility] prescribing provider:  12/05/19-Justyna   Future Office Visit:    Next 5 appointments (look out 90 days)    Mar 17, 2020 10:45 AM CDT  Return Visit with Catia Loredo MD  [facility] ([facility]) [address] [place] [id number] [phone]   Mar 19, 2020  9:00 AM CDT  PHYSICAL with Juan Carlos Jansen MD  [facility] ([facility]) [address] [facility] [address] [phone]        90 tablet 0     Sig: TAKE 1 TABLET BY MOUTH ONCE DAILY FOR THYROID       Thyroid Protocol Failed - 3/12/2020 12:59 PM        Failed - Normal TSH on file in past 12 months     Recent Labs   Lab Test 09/09/19  1430   TSH 4.24*              Passed - Patient is 12 years or older        Passed - Recent (12 mo) or future (30 days) visit within the authorizing provider's specialty     Patient has had an office visit with the authorizing provider or a provider within the authorizing providers department within the previous 12 mos or has a future within next 30 days. See \"Patient Info\" tab in inbasket, or \"Choose Columns\" in Meds & Orders section of the refill encounter.              Passed - Medication is active on med list             "

## 2020-03-16 RX ORDER — LEVOTHYROXINE SODIUM 100 UG/1
100 TABLET ORAL DAILY
Qty: 90 TABLET | Refills: 0 | Status: SHIPPED | OUTPATIENT
Start: 2020-03-16 | End: 2020-07-02

## 2020-03-16 RX ORDER — LISINOPRIL 40 MG/1
TABLET ORAL
Qty: 90 TABLET | Refills: 0 | Status: SHIPPED | OUTPATIENT
Start: 2020-03-16 | End: 2020-06-23

## 2020-04-01 DIAGNOSIS — N18.30 BENIGN HYPERTENSION WITH CHRONIC KIDNEY DISEASE, STAGE III (H): ICD-10-CM

## 2020-04-01 DIAGNOSIS — I12.9 BENIGN HYPERTENSION WITH CHRONIC KIDNEY DISEASE, STAGE III (H): ICD-10-CM

## 2020-04-01 NOTE — TELEPHONE ENCOUNTER
"Requested Prescriptions   Pending Prescriptions Disp Refills     metoprolol succinate ER (TOPROL-XL) 50 MG 24 hr tablet [Pharmacy Med Name: Metoprolol Succinate ER 50 MG Oral Tablet Extended Release 24 Hour] 90 tablet 0     Sig: TAKE 1 TABLET BY MOUTH IN THE EVENING FOR BLOOD PRESSURE       Beta-Blockers Protocol Passed - 4/1/2020  6:20 PM        Passed - Blood pressure under 140/90 in past 12 months     BP Readings from Last 3 Encounters:   12/05/19 120/56   09/11/19 (!) 172/80   09/09/19 (!) 181/79                 Passed - Patient is age 6 or older        Passed - Recent (12 mo) or future (30 days) visit within the authorizing provider's specialty     Patient has had an office visit with the authorizing provider or a provider within the authorizing providers department within the previous 12 mos or has a future within next 30 days. See \"Patient Info\" tab in inbasket, or \"Choose Columns\" in Meds & Orders section of the refill encounter.              Passed - Medication is active on med list           Last Written Prescription Date:  9/9/19  Last Fill Quantity: 90,  # refills: 1   Last office visit: 12/5/2019 with prescribing provider:  Juan Carlos Jansen     Future Office Visit:   Next 5 appointments (look out 90 days)    Apr 15, 2020  9:45 AM CDT  Return Visit with Areli Kumari PA-C  [facility]) [id number] [place] [address] [phone]           "

## 2020-04-02 RX ORDER — METOPROLOL SUCCINATE 50 MG/1
TABLET, EXTENDED RELEASE ORAL
Qty: 90 TABLET | Refills: 0 | Status: SHIPPED | OUTPATIENT
Start: 2020-04-02 | End: 2020-07-15

## 2020-04-02 NOTE — TELEPHONE ENCOUNTER
Prescription approved per [facility] Refill Protocol.    Alethea Cartagena RN  [facility]

## 2020-05-17 DIAGNOSIS — E78.5 HYPERLIPIDEMIA LDL GOAL <130: ICD-10-CM

## 2020-05-20 RX ORDER — LOVASTATIN 20 MG
TABLET ORAL
Qty: 90 TABLET | Refills: 0 | Status: SHIPPED | OUTPATIENT
Start: 2020-05-20 | End: 2020-08-19

## 2020-05-20 NOTE — TELEPHONE ENCOUNTER
"Requested Prescriptions   Pending Prescriptions Disp Refills     lovastatin (MEVACOR) 20 MG tablet [Pharmacy Med Name: Lovastatin 20 MG Oral Tablet] 90 tablet 0     Sig: TAKE 1 TABLET BY MOUTH IN THE EVENING FOR CHOLESTEROL       Statins Protocol Passed - 5/20/2020 10:56 AM        Passed - LDL on file in past 12 months     Recent Labs   Lab Test 09/09/19  1430   LDL 90             Passed - No abnormal creatine kinase in past 12 months     No lab results found.             Passed - Recent (12 mo) or future (30 days) visit within the authorizing provider's specialty     Patient has had an office visit with the authorizing provider or a provider within the authorizing providers department within the previous 12 mos or has a future within next 30 days. See \"Patient Info\" tab in inbasket, or \"Choose Columns\" in Meds & Orders section of the refill encounter.              Passed - Medication is active on med list        Passed - Patient is age 18 or older           Prescription approved per [facility] Refill Protocol.      Yulisa Garza RN, BSN, PHN    "

## 2020-05-20 NOTE — TELEPHONE ENCOUNTER
Reason for Call:  Other prescription    Detailed comments: Pt's Spouse calling to follow up on medication request for Pt is out of Rx and would like for Dr. Jansen to send in Rx to Pharmacy as soon as possible.  She would like a call back to confirm Rx has been snet.    Phone Number Patient can be reached at: Home number on file [phone] (home)    Best Time: anytime    Can we leave a detailed message on this number? YES    Call taken on 5/20/2020 at 10:34 AM by Kamar Thomason

## 2020-06-30 DIAGNOSIS — I12.9 BENIGN HYPERTENSION WITH CHRONIC KIDNEY DISEASE, STAGE III (H): ICD-10-CM

## 2020-06-30 DIAGNOSIS — N18.30 BENIGN HYPERTENSION WITH CHRONIC KIDNEY DISEASE, STAGE III (H): ICD-10-CM

## 2020-06-30 DIAGNOSIS — E03.4 HYPOTHYROIDISM DUE TO ACQUIRED ATROPHY OF THYROID: ICD-10-CM

## 2020-06-30 DIAGNOSIS — E78.5 HYPERLIPIDEMIA LDL GOAL <130: Primary | ICD-10-CM

## 2020-06-30 NOTE — LETTER
July 3, 2020      James Daina  [address]  [place] [address]        Dear mgkate,      At [facility] we care about your health and are committed to providing quality patient care. Regular appointments are a vital part of the care and management of your health and can help prevent many of the complications that can occur.       It has come to our attention that you have been given a refill of levothyroxine and that you are due for lab work for further refills.  Please call [facility] at [phone] to schedule your lab appointment in July.       Sincerely,   [facility] Care Team

## 2020-07-01 DIAGNOSIS — E03.4 HYPOTHYROIDISM DUE TO ACQUIRED ATROPHY OF THYROID: ICD-10-CM

## 2020-07-01 NOTE — TELEPHONE ENCOUNTER
Routing refill request to provider for review/approval because:   No Normal TSH on file in past 12 months       Ewelina Dillon RN

## 2020-07-01 NOTE — TELEPHONE ENCOUNTER
Pending Prescriptions:                       Disp   Refills    levothyroxine (SYNTHROID/LEVOTHROID) 100 *90 tab*0            Sig: Take 1 tablet (100 mcg) by mouth daily for           thyroid.

## 2020-07-02 RX ORDER — LEVOTHYROXINE SODIUM 100 UG/1
100 TABLET ORAL DAILY
Qty: 90 TABLET | Refills: 0 | Status: SHIPPED | OUTPATIENT
Start: 2020-07-02 | End: 2020-09-23

## 2020-07-03 RX ORDER — LEVOTHYROXINE SODIUM 100 UG/1
100 TABLET ORAL DAILY
Qty: 90 TABLET | Refills: 0 | OUTPATIENT
Start: 2020-07-03

## 2020-07-03 NOTE — TELEPHONE ENCOUNTER
Reminder letter to schedule needed LAB appt for further refills mailed to pt's home address.      Fani ZAMUDIO (R))

## 2020-07-03 NOTE — TELEPHONE ENCOUNTER
"Denied  Refill duplicate request  Sent 7/2/2020 to this pharmacy  Requested Prescriptions   Pending Prescriptions Disp Refills     levothyroxine (SYNTHROID/LEVOTHROID) 100 MCG tablet 90 tablet 0     Sig: Take 1 tablet (100 mcg) by mouth daily for thyroid.   Last Written Prescription Date:  7/2/2020  Last Fill Quantity: 90,  # refills: 0   Last office visit: 12/5/2019 with prescribing provider:     Future Office Visit:   Next 5 appointments (look out 90 days)    Aug 12, 2020  1:00 PM CDT  Return Visit with Areli Kumari PA-C  [facility]) [address] [place] [address] [phone]           Thyroid Protocol Failed - 7/2/2020  7:02 AM        Failed - Normal TSH on file in past 12 months     Recent Labs   Lab Test 09/09/19  1430   TSH 4.24*              Passed - Patient is 12 years or older        Passed - Recent (12 mo) or future (30 days) visit within the authorizing provider's specialty     Patient has had an office visit with the authorizing provider or a provider within the authorizing providers department within the previous 12 mos or has a future within next 30 days. See \"Patient Info\" tab in inbasket, or \"Choose Columns\" in Meds & Orders section of the refill encounter.              Passed - Medication is active on med list         Brandy Michel RN    "

## 2020-07-10 DIAGNOSIS — E03.4 HYPOTHYROIDISM DUE TO ACQUIRED ATROPHY OF THYROID: ICD-10-CM

## 2020-07-10 DIAGNOSIS — E78.5 HYPERLIPIDEMIA LDL GOAL <130: ICD-10-CM

## 2020-07-10 DIAGNOSIS — I12.9 BENIGN HYPERTENSION WITH CHRONIC KIDNEY DISEASE, STAGE III (H): ICD-10-CM

## 2020-07-10 DIAGNOSIS — N18.30 BENIGN HYPERTENSION WITH CHRONIC KIDNEY DISEASE, STAGE III (H): ICD-10-CM

## 2020-07-10 LAB
ALT SERPL W P-5'-P-CCNC: 17 U/L (ref 0–70)
CHOLEST SERPL-MCNC: 176 MG/DL
CREAT SERPL-MCNC: 1.26 MG/DL (ref 0.66–1.25)
GFR SERPL CREATININE-BSD FRML MDRD: 49 ML/MIN/{1.73_M2}
HDLC SERPL-MCNC: 39 MG/DL
LDLC SERPL CALC-MCNC: 100 MG/DL
NONHDLC SERPL-MCNC: 137 MG/DL
POTASSIUM SERPL-SCNC: 4.3 MMOL/L (ref 3.4–5.3)
TRIGL SERPL-MCNC: 183 MG/DL
TSH SERPL DL<=0.005 MIU/L-ACNC: 3.57 MU/L (ref 0.4–4)

## 2020-07-10 PROCEDURE — 80061 LIPID PANEL: CPT | Performed by: FAMILY MEDICINE

## 2020-07-10 PROCEDURE — 84443 ASSAY THYROID STIM HORMONE: CPT | Performed by: FAMILY MEDICINE

## 2020-07-10 PROCEDURE — 84132 ASSAY OF SERUM POTASSIUM: CPT | Performed by: FAMILY MEDICINE

## 2020-07-10 PROCEDURE — 36415 COLL VENOUS BLD VENIPUNCTURE: CPT | Performed by: FAMILY MEDICINE

## 2020-07-10 PROCEDURE — 84460 ALANINE AMINO (ALT) (SGPT): CPT | Performed by: FAMILY MEDICINE

## 2020-07-10 PROCEDURE — 82565 ASSAY OF CREATININE: CPT | Performed by: FAMILY MEDICINE

## 2020-07-13 DIAGNOSIS — I12.9 BENIGN HYPERTENSION WITH CHRONIC KIDNEY DISEASE, STAGE III (H): ICD-10-CM

## 2020-07-13 DIAGNOSIS — N18.30 BENIGN HYPERTENSION WITH CHRONIC KIDNEY DISEASE, STAGE III (H): ICD-10-CM

## 2020-07-15 RX ORDER — METOPROLOL SUCCINATE 50 MG/1
TABLET, EXTENDED RELEASE ORAL
Qty: 90 TABLET | Refills: 0 | Status: SHIPPED | OUTPATIENT
Start: 2020-07-15 | End: 2020-08-13

## 2020-07-15 NOTE — TELEPHONE ENCOUNTER
Prescription approved per [facility] Refill Protocol.    Sita Griffin RN

## 2020-08-12 ENCOUNTER — OFFICE VISIT (OUTPATIENT)
Dept: DERMATOLOGY | Facility: CLINIC | Age: 85
End: 2020-08-12
Payer: COMMERCIAL

## 2020-08-12 ENCOUNTER — OFFICE VISIT (OUTPATIENT)
Dept: URGENT CARE | Facility: URGENT CARE | Age: 85
End: 2020-08-12
Payer: COMMERCIAL

## 2020-08-12 VITALS
RESPIRATION RATE: 20 BRPM | BODY MASS INDEX: 29.56 KG/M2 | DIASTOLIC BLOOD PRESSURE: 60 MMHG | SYSTOLIC BLOOD PRESSURE: 142 MMHG | TEMPERATURE: 96.6 F | OXYGEN SATURATION: 99 % | HEART RATE: 51 BPM | WEIGHT: 206 LBS

## 2020-08-12 VITALS
SYSTOLIC BLOOD PRESSURE: 191 MMHG | HEART RATE: 51 BPM | HEIGHT: 70 IN | BODY MASS INDEX: 28.63 KG/M2 | OXYGEN SATURATION: 100 % | WEIGHT: 200 LBS | DIASTOLIC BLOOD PRESSURE: 75 MMHG

## 2020-08-12 DIAGNOSIS — B07.8 OTHER VIRAL WARTS: ICD-10-CM

## 2020-08-12 DIAGNOSIS — Z86.018 HISTORY OF DYSPLASTIC NEVUS: ICD-10-CM

## 2020-08-12 DIAGNOSIS — Z12.83 SKIN CANCER SCREENING: ICD-10-CM

## 2020-08-12 DIAGNOSIS — N18.30 CHRONIC KIDNEY DISEASE, STAGE 3 (MODERATE): ICD-10-CM

## 2020-08-12 DIAGNOSIS — L57.0 ACTINIC KERATOSIS: Primary | ICD-10-CM

## 2020-08-12 DIAGNOSIS — Z85.828 HISTORY OF NONMELANOMA SKIN CANCER: ICD-10-CM

## 2020-08-12 DIAGNOSIS — I10 HYPERTENSION, UNSPECIFIED TYPE: ICD-10-CM

## 2020-08-12 DIAGNOSIS — I10 HYPERTENSION GOAL BP (BLOOD PRESSURE) < 140/90: Primary | ICD-10-CM

## 2020-08-12 LAB
ALBUMIN SERPL-MCNC: 3.6 G/DL (ref 3.4–5)
ALP SERPL-CCNC: 89 U/L (ref 40–150)
ALT SERPL W P-5'-P-CCNC: 17 U/L (ref 0–70)
ANION GAP SERPL CALCULATED.3IONS-SCNC: 6 MMOL/L (ref 3–14)
AST SERPL W P-5'-P-CCNC: 16 U/L (ref 0–45)
BILIRUB SERPL-MCNC: 0.4 MG/DL (ref 0.2–1.3)
BUN SERPL-MCNC: 26 MG/DL (ref 7–30)
CALCIUM SERPL-MCNC: 9.3 MG/DL (ref 8.5–10.1)
CHLORIDE SERPL-SCNC: 108 MMOL/L (ref 94–109)
CO2 SERPL-SCNC: 27 MMOL/L (ref 20–32)
CREAT SERPL-MCNC: 1.2 MG/DL (ref 0.66–1.25)
GFR SERPL CREATININE-BSD FRML MDRD: 52 ML/MIN/{1.73_M2}
GLUCOSE SERPL-MCNC: 90 MG/DL (ref 70–99)
POTASSIUM SERPL-SCNC: 4.7 MMOL/L (ref 3.4–5.3)
PROT SERPL-MCNC: 7.2 G/DL (ref 6.8–8.8)
SODIUM SERPL-SCNC: 141 MMOL/L (ref 133–144)

## 2020-08-12 PROCEDURE — 80053 COMPREHEN METABOLIC PANEL: CPT | Performed by: PHYSICIAN ASSISTANT

## 2020-08-12 PROCEDURE — 36415 COLL VENOUS BLD VENIPUNCTURE: CPT | Performed by: PHYSICIAN ASSISTANT

## 2020-08-12 PROCEDURE — 99214 OFFICE O/P EST MOD 30 MIN: CPT | Mod: 25 | Performed by: PHYSICIAN ASSISTANT

## 2020-08-12 PROCEDURE — 99214 OFFICE O/P EST MOD 30 MIN: CPT | Performed by: PHYSICIAN ASSISTANT

## 2020-08-12 PROCEDURE — 17000 DESTRUCT PREMALG LESION: CPT | Mod: 59 | Performed by: PHYSICIAN ASSISTANT

## 2020-08-12 PROCEDURE — 17110 DESTRUCTION B9 LES UP TO 14: CPT | Performed by: PHYSICIAN ASSISTANT

## 2020-08-12 ASSESSMENT — ENCOUNTER SYMPTOMS
DYSURIA: 0
PALPITATIONS: 0
SORE THROAT: 0
ABDOMINAL PAIN: 0
DIARRHEA: 0
MYALGIAS: 0
ENDOCRINE NEGATIVE: 1
SPEECH DIFFICULTY: 0
DIAPHORESIS: 0
CARDIOVASCULAR NEGATIVE: 1
CHEST TIGHTNESS: 0
LIGHT-HEADEDNESS: 0
GASTROINTESTINAL NEGATIVE: 1
EYE DISCHARGE: 0
FACIAL ASYMMETRY: 0
POLYDIPSIA: 0
EYE ITCHING: 0
CHILLS: 0
HEMATURIA: 0
EYE REDNESS: 0
ADENOPATHY: 0
EYES NEGATIVE: 1
RHINORRHEA: 0
FEVER: 0
WHEEZING: 0
NAUSEA: 0
SHORTNESS OF BREATH: 0
NEUROLOGICAL NEGATIVE: 1
WEAKNESS: 0
CONSTITUTIONAL NEGATIVE: 1
DIZZINESS: 0
FREQUENCY: 0
COUGH: 0
MUSCULOSKELETAL NEGATIVE: 1
HEADACHES: 0
VOMITING: 0
RESPIRATORY NEGATIVE: 1

## 2020-08-12 ASSESSMENT — MIFFLIN-ST. JEOR: SCORE: 1558.44

## 2020-08-12 NOTE — PROGRESS NOTES
James Lama's goals for this visit include:   Chief Complaint   Patient presents with     Skin Check     Hx NMSC.        He requests these members of his care team be copied on today's visit information: no    PCP: Juan Carlos Jansen    Referring Provider:  No referring provider defined for this encounter.    There were no vitals taken for this visit.    Do you need any medication refills at today's visit? No  Tisha Olsen LPN

## 2020-08-12 NOTE — LETTER
8/12/2020         RE: James Lama  [address]  [place] [address]        Dear Colleague,    Thank you for referring your patient, James Lama, to the [facility]. Please see a copy of my visit note below.    James Lama's goals for this visit include:   Chief Complaint   Patient presents with     Skin Check     Hx NMSC.        He requests these members of his care team be copied on today's visit information: no    PCP: Juan Carlos Jansen    Referring Provider:  No referring provider defined for this encounter.    There were no vitals taken for this visit.    Do you need any medication refills at today's visit? No  Tisha Olsen LPN        [facility] Dermatology Note      Dermatology Problem List:  1. 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 Hx of DN   -Junctional dysplastic nevus with mild atypia, left lower back, s/p 2/14/2017   3. Actinic keratosis  -s/p cryotherapy    CC:   Chief Complaint   Patient presents with     Skin Check     Hx NMSC.      Encounter Date: Aug 12, 2020    History of Present Illness:  Mr. James Lama is a 89 year old male who presents for a FBSE. He has a spot of concern on the left temple as well as a wart on the R thumb which suddenly appeared. He has been scraping it and it has gotten a bit smaller he says. Also notes a large red bruise on the right forearm. He is very pleasant. He otherwise has no other concerns and notes he is feeling well. Patient notes his blood pressure is never as high as it was today. States he feels fine, feels normal. No SOB, HA.     Past Medical History:   Patient Active Problem List   Diagnosis     Prostate cancer (H)     Hyperlipidemia LDL goal <130     Benign hypertension with chronic kidney disease, stage III (H)     KESHA (obstructive sleep apnea)     Gout      Diverticulosis     Perennial allergic rhinitis     GERD (gastroesophageal reflux disease)     Hypothyroidism due to acquired atrophy of thyroid     Erectile dysfunction     Hypertension goal BP (blood pressure) < 140/90     Right inguinal hernia     History of basal cell carcinoma     Advance care planning     Paresthesias/numbness, both feet     Vitamin B12 deficiency (non anemic)     Health Care Home     Actinic keratosis     Obesity, Class I, BMI 30-34.9     Recurrent falls     Cortical cataract of both eyes     Nuclear sclerosis of both eyes     Chronic kidney disease, stage 3 (moderate) (H)     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 (H)     Perennial allergic rhinitis, unspecified allergic rhinitis trigger     Cognitive impairment, mild, so stated     Hyponatremia     Dermatochalasis of eyelid     Past Medical History:   Diagnosis Date     Actinic keratosis      Arthritis      Basal cell carcinoma Pre-2009    nose, forehead, back     BPH      Diverticulosis 1/02     Erectile dysfunction      Essential hypertension, benign      Gout      Hyperlipidemia LDL goal <130      Hypothyroidism      Nonsenile cataract      KESHA (obstructive sleep apnoea)      Perennial allergic rhinitis     dust, mold     Prostate cancer (H) 1/04    s/p XRT 2005, Dr. Mcmanus     Recurrent BCC (basal cell carcinoma)      Past Surgical History:   Procedure Laterality Date     EXC SKIN MALIG 0.5CM OR LESS,FACIAL  5/09    BCC nasal tip     EXC SKIN MALIG 0.6-1CM FACE,FACIAL  5/09    BCC left ala     EXC SKIN MALIG 1.1-2CM FACE,FACIAL  10/09    BCC forehead     EXC SKIN MALIG 1.1-2CM TRUNK,ARM,LEG  8/06    BCC upper back     HERNIA REPAIR, INGUINAL RT/LT  ~1988    LT       Social History:  Present with Wife  WWII     Family History:  There is no family history of skin cancer.    Medications:  Current Outpatient Medications   Medication Sig Dispense Refill     acetaminophen (TYLENOL) 500 MG tablet Take 2 tablets by mouth 2 times daily as  needed for pain (knee).  0     ASPIRIN 81 MG OR TABS 1 tablet daily*       clobetasol (TEMOVATE) 0.05 % external solution Apply to scalp. External use only 50 mL 0     Cyanocobalamin 1000 MCG TABS 1 tablet daily for foot neuropathy (vitamin B12).       fluticasone (FLONASE) 50 MCG/ACT nasal spray USE 1 TO 2 SPRAY(S) IN EACH NOSTRIL ONCE DAILY FOR ALLERGY PREVENTION. 16 g 2     levothyroxine (SYNTHROID/LEVOTHROID) 100 MCG tablet Take 1 tablet (100 mcg) by mouth daily for thyroid. 90 tablet 0     lisinopril (ZESTRIL) 40 MG tablet Take 1 tablet by mouth once daily for blood pressure 90 tablet 0     lovastatin (MEVACOR) 20 MG tablet TAKE 1 TABLET BY MOUTH IN THE EVENING FOR CHOLESTEROL 90 tablet 0     metoprolol succinate ER (TOPROL-XL) 50 MG 24 hr tablet TAKE 1 TABLET BY MOUTH IN THE EVENING FOR BLOOD PRESSURE 90 tablet 0     omeprazole (PRILOSEC) 20 MG DR capsule Take 2 capsules (40 mg) by mouth daily for reflux. Take 30-60 minutes before a meal. 180 capsule 3     VITAMIN D, CHOLECALCIFEROL, PO Take 1,000 Units by mouth daily        Multiple Vitamins-Minerals (PRESERVISION AREDS PO) Take 1 capsule by mouth 2 times daily (with meals) (PreserVision AREDS 2)       polyethylene glycol (MIRALAX) powder Take 17 g (1 capful) by mouth daily (Patient not taking: Reported on 8/12/2020) 510 g 1     Allergies   Allergen Reactions     Acrylate Copolymer Rash     topical     Review of Systems:  -Constitutional: The patient denies fatigue, fevers, chills, unintended weight loss, and night sweats. No diaphoresis. Ambulating normally  -HEENT: Patient denies nonhealing oral sores.  -Skin: As above in HPI. No additional skin concerns.  -Neuro: no HA or vision changes, speaking normally  -Cardiovascular: no chest pain, palpitations  -Respiratory: no shortness of breath     Physical exam:  Vitals: BP (!) 198/65 (BP Location: Left arm, Patient Position: Sitting, Cuff Size: Adult Regular)   Pulse 51   Temp 100  F (37.8  C)   Ht 1.778 m  "(5' 10\")   Wt 90.7 kg (200 lb)   BMI 28.70 kg/m    GEN: This is a well developed, well-nourished male in no acute distress, in a pleasant mood.    SKIN: Total skin excluding the undergarment areas was performed. The exam included the head/face, neck, both arms, chest, back, abdomen, both legs, digits and/or nails.   -Virgen's skin type II, less than 100 nevi  -There is an erythematous macule with overyling adherent scale on the L temple.  -There is a verrucous papule with thrombosed capillaries interrupting dermatoglyphics on the R thumb.  - Well healed scar on the upper back   - Well healed linear scar at the left lateral neck  - Well healed scar on the nose.  - Well healed scar on the forehead  - Not diaphoretic  -No other lesions of concern on areas examined.     Impression/Plan:  1. Verruca vulgaris - x1 on R thumb  -Cryotherapy procedure note: After verbal consent and discussion of risks and benefits including but no limited to dyspigmentation/scar, blister, and pain, 1 was(were) treated with 1-2mm freeze border for 2 cycles with liquid nitrogen. Post cryotherapy instructions were provided.  -discussed etiology, benign nature and difficulty of treatment  -Advised continued use of OTC products between visits    2. Actinic keratosis x1 left temple  -Cryotherapy procedure note: After verbal consent and discussion of risks and benefits including but no limited to dyspigmentation/scar, blister, and pain, 1 was(were) treated with 1-2mm freeze border for 2 cycles with liquid nitrogen. Post cryotherapy instructions were provided.  -Discussed etiology  -Continue with annual skin exams    3. Hx of NMSC x4, NERD/Hx of DN   -Continue with annual exams    4. Hypertensive - recommend patient follow up with PCP regarding elevated blood pressure - was elevated upon arrival as well as prior to patient leaving the clinic. No other sx of diaphoresis, HA, SOB, malaise. Patient is walking and talking well.    5. Skin Cancer " Exam  -ABCDs of melanoma were discussed and self skin checks were advised.  -Sun precaution was advised including the use of sun screens of SPF 30 or higher, sun protective clothing, and avoidance of tanning beds.    CC Dr. Diaz on close of this encounter.  Follow-up in 1 year, earlier for new or changing lesions.     Staff Involved:  Staff Only  All risks, benefits and alternatives were discussed with patient.  Patient is in agreement and understands the assessment and plan.  All questions were answered.    Areli Kumari PA-C, [facility]  [facility] [place]: Phone: [phone], Fax: [phone]  [facility]: Phone: [phone],  Fax: [phone]              Again, thank you for allowing me to participate in the care of your patient.        Sincerely,        Areli Kumari PA-C

## 2020-08-12 NOTE — PROGRESS NOTES
[facility] Dermatology Note      Dermatology Problem List:  1. 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 Hx of DN   -Junctional dysplastic nevus with mild atypia, left lower back, s/p 2/14/2017   3. Actinic keratosis  -s/p cryotherapy    CC:   Chief Complaint   Patient presents with     Skin Check     Hx NMSC.      Encounter Date: Aug 12, 2020    History of Present Illness:  Mr. James Lama is a 89 year old male who presents for a FBSE. He has a spot of concern on the left temple as well as a wart on the R thumb which suddenly appeared. He has been scraping it and it has gotten a bit smaller he says. Also notes a large red bruise on the right forearm. He is very pleasant. He otherwise has no other concerns and notes he is feeling well. Patient notes his blood pressure is never as high as it was today. States he feels fine, feels normal. No SOB, HA.     Past Medical History:   Patient Active Problem List   Diagnosis     Prostate cancer (H)     Hyperlipidemia LDL goal <130     Benign hypertension with chronic kidney disease, stage III (H)     KESHA (obstructive sleep apnea)     Gout     Diverticulosis     Perennial allergic rhinitis     GERD (gastroesophageal reflux disease)     Hypothyroidism due to acquired atrophy of thyroid     Erectile dysfunction     Hypertension goal BP (blood pressure) < 140/90     Right inguinal hernia     History of basal cell carcinoma     Advance care planning     Paresthesias/numbness, both feet     Vitamin B12 deficiency (non anemic)     Health Care Home     Actinic keratosis     Obesity, Class I, BMI 30-34.9     Recurrent falls     Cortical cataract of both eyes     Nuclear sclerosis of both eyes     Chronic kidney disease, stage 3 (moderate) (H)     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 (H)     Perennial allergic rhinitis, unspecified allergic  rhinitis trigger     Cognitive impairment, mild, so stated     Hyponatremia     Dermatochalasis of eyelid     Past Medical History:   Diagnosis Date     Actinic keratosis      Arthritis      Basal cell carcinoma Pre-2009    nose, forehead, back     BPH      Diverticulosis 1/02     Erectile dysfunction      Essential hypertension, benign      Gout      Hyperlipidemia LDL goal <130      Hypothyroidism      Nonsenile cataract      KESHA (obstructive sleep apnoea)      Perennial allergic rhinitis     dust, mold     Prostate cancer (H) 1/04    s/p XRT 2005, Dr. Mcmanus     Recurrent BCC (basal cell carcinoma)      Past Surgical History:   Procedure Laterality Date     EXC SKIN MALIG 0.5CM OR LESS,FACIAL  5/09    BCC nasal tip     EXC SKIN MALIG 0.6-1CM FACE,FACIAL  5/09    BCC left ala     EXC SKIN MALIG 1.1-2CM FACE,FACIAL  10/09    BCC forehead     EXC SKIN MALIG 1.1-2CM TRUNK,ARM,LEG  8/06    BCC upper back     HERNIA REPAIR, INGUINAL RT/LT  ~1988    LT       Social History:  Present with Wife  WWII [place]    Family History:  There is no family history of skin cancer.    Medications:  Current Outpatient Medications   Medication Sig Dispense Refill     acetaminophen (TYLENOL) 500 MG tablet Take 2 tablets by mouth 2 times daily as needed for pain (knee).  0     ASPIRIN 81 MG OR TABS 1 tablet daily*       clobetasol (TEMOVATE) 0.05 % external solution Apply to scalp. External use only 50 mL 0     Cyanocobalamin 1000 MCG TABS 1 tablet daily for foot neuropathy (vitamin B12).       fluticasone (FLONASE) 50 MCG/ACT nasal spray USE 1 TO 2 SPRAY(S) IN EACH NOSTRIL ONCE DAILY FOR ALLERGY PREVENTION. 16 g 2     levothyroxine (SYNTHROID/LEVOTHROID) 100 MCG tablet Take 1 tablet (100 mcg) by mouth daily for thyroid. 90 tablet 0     lisinopril (ZESTRIL) 40 MG tablet Take 1 tablet by mouth once daily for blood pressure 90 tablet 0     lovastatin (MEVACOR) 20 MG tablet TAKE 1 TABLET BY MOUTH IN THE EVENING FOR CHOLESTEROL 90 tablet 0      "metoprolol succinate ER (TOPROL-XL) 50 MG 24 hr tablet TAKE 1 TABLET BY MOUTH IN THE EVENING FOR BLOOD PRESSURE 90 tablet 0     omeprazole (PRILOSEC) 20 MG DR capsule Take 2 capsules (40 mg) by mouth daily for reflux. Take 30-60 minutes before a meal. 180 capsule 3     VITAMIN D, CHOLECALCIFEROL, PO Take 1,000 Units by mouth daily        Multiple Vitamins-Minerals (PRESERVISION AREDS PO) Take 1 capsule by mouth 2 times daily (with meals) (PreserVision AREDS 2)       polyethylene glycol (MIRALAX) powder Take 17 g (1 capful) by mouth daily (Patient not taking: Reported on 8/12/2020) 510 g 1     Allergies   Allergen Reactions     Acrylate Copolymer Rash     topical     Review of Systems:  -Constitutional: The patient denies fatigue, fevers, chills, unintended weight loss, and night sweats. No diaphoresis. Ambulating normally  -HEENT: Patient denies nonhealing oral sores.  -Skin: As above in HPI. No additional skin concerns.  -Neuro: no HA or vision changes, speaking normally  -Cardiovascular: no chest pain, palpitations  -Respiratory: no shortness of breath     Physical exam:  Vitals: BP (!) 198/65 (BP Location: Left arm, Patient Position: Sitting, Cuff Size: Adult Regular)   Pulse 51   Temp 100  F (37.8  C)   Ht 1.778 m (5' 10\")   Wt 90.7 kg (200 lb)   BMI 28.70 kg/m    GEN: This is a well developed, well-nourished male in no acute distress, in a pleasant mood.    SKIN: Total skin excluding the undergarment areas was performed. The exam included the head/face, neck, both arms, chest, back, abdomen, both legs, digits and/or nails.   -Virgen's skin type II, less than 100 nevi  -There is an erythematous macule with overyling adherent scale on the L temple.  -There is a verrucous papule with thrombosed capillaries interrupting dermatoglyphics on the R thumb.  - Well healed scar on the upper back   - Well healed linear scar at the left lateral neck  - Well healed scar on the nose.  - Well healed scar on the " forehead  - Not diaphoretic  -No other lesions of concern on areas examined.     Impression/Plan:  1. Verruca vulgaris - x1 on R thumb  -Cryotherapy procedure note: After verbal consent and discussion of risks and benefits including but no limited to dyspigmentation/scar, blister, and pain, 1 was(were) treated with 1-2mm freeze border for 2 cycles with liquid nitrogen. Post cryotherapy instructions were provided.  -discussed etiology, benign nature and difficulty of treatment  -Advised continued use of OTC products between visits    2. Actinic keratosis x1 left temple  -Cryotherapy procedure note: After verbal consent and discussion of risks and benefits including but no limited to dyspigmentation/scar, blister, and pain, 1 was(were) treated with 1-2mm freeze border for 2 cycles with liquid nitrogen. Post cryotherapy instructions were provided.  -Discussed etiology  -Continue with annual skin exams    3. Hx of NMSC x4, NERD/Hx of DN   -Continue with annual exams    4. Hypertensive - recommend patient follow up with PCP regarding elevated blood pressure - was elevated upon arrival as well as prior to patient leaving the clinic. No other sx of diaphoresis, HA, SOB, malaise. Patient is walking and talking well.    5. Skin Cancer Exam  -ABCDs of melanoma were discussed and self skin checks were advised.  -Sun precaution was advised including the use of sun screens of SPF 30 or higher, sun protective clothing, and avoidance of tanning beds.    CC Dr. Diaz on close of this encounter.  Follow-up in 1 year, earlier for new or changing lesions.     Staff Involved:  Staff Only  All risks, benefits and alternatives were discussed with patient.  Patient is in agreement and understands the assessment and plan.  All questions were answered.    Areli Kumari PA-C, MPAS  [facility] [place]: Phone: [phone], Fax: [phone]  [facility]:  Phone: [phone],  Fax: [phone]

## 2020-08-12 NOTE — PROGRESS NOTES
"Chief Complaint:    Chief Complaint   Patient presents with     Hypertension     BP of 198 today in Derm.        HPI: James Lama is an 89 year old male who presents for evaluation and treatment of high blood pressure.  Patient was at the dermatologist this morning and was advised to come in and be seen as his BP was 198/65.  Patient is currently asymptomatic.  He is on Lisinopril, and Metoprolol.    Patient has a complicated medical Hx \"see problem list below.\"    ROS:      Review of Systems   Constitutional: Negative.  Negative for chills, diaphoresis and fever.   HENT: Negative.  Negative for congestion, ear pain, rhinorrhea and sore throat.    Eyes: Negative.  Negative for discharge, redness and itching.   Respiratory: Negative.  Negative for cough, chest tightness, shortness of breath and wheezing.    Cardiovascular: Negative.  Negative for chest pain, palpitations and leg swelling.   Gastrointestinal: Negative.  Negative for abdominal pain, diarrhea, nausea and vomiting.   Endocrine: Negative.  Negative for polydipsia and polyuria.   Genitourinary: Negative for dysuria, frequency, hematuria and urgency.   Musculoskeletal: Negative.  Negative for myalgias.   Skin: Negative for rash.   Allergic/Immunologic: Negative for immunocompromised state.   Neurological: Negative.  Negative for dizziness, syncope, facial asymmetry, speech difficulty, weakness, light-headedness and headaches.   Hematological: Negative for adenopathy.        Family History   Family History   Problem Relation Age of Onset     Cancer Father         , unk type     Myocardial Infarction Sister      Thyroid Disease Sister      Diabetes No family hx of      Hypertension No family hx of      Cerebrovascular Disease No family hx of      Glaucoma No family hx of      Macular Degeneration No family hx of        Social History  Social History     Socioeconomic History     Marital status:      Spouse name: Lesia     Number of children: 6 "     Years of education: 16.5     Highest education level: Not on file   Occupational History     Occupation:      Employer: RETIRED     Comment: 1999   Social Needs     Financial resource strain: Not on file     Food insecurity     Worry: Not on file     Inability: Not on file     Transportation needs     Medical: Not on file     Non-medical: Not on file   Tobacco Use     Smoking status: Never Smoker     Smokeless tobacco: Never Used   Substance and Sexual Activity     Alcohol use: No     Drug use: No     Sexual activity: Yes     Partners: Female     Birth control/protection: None   Lifestyle     Physical activity     Days per week: Not on file     Minutes per session: Not on file     Stress: Not on file   Relationships     Social connections     Talks on phone: Not on file     Gets together: Not on file     Attends Christianity service: Not on file     Active member of club or organization: Not on file     Attends meetings of clubs or organizations: Not on file     Relationship status: Not on file     Intimate partner violence     Fear of current or ex partner: Not on file     Emotionally abused: Not on file     Physically abused: Not on file     Forced sexual activity: Not on file   Other Topics Concern     Parent/sibling w/ CABG, MI or angioplasty before 65F 55M? Not Asked   Social History Narrative    Wife has lymphoma        Surgical History:  Past Surgical History:   Procedure Laterality Date     EXC SKIN MALIG 0.5CM OR LESS,FACIAL  5/09    BCC nasal tip     EXC SKIN MALIG 0.6-1CM FACE,FACIAL  5/09    BCC left ala     EXC SKIN MALIG 1.1-2CM FACE,FACIAL  10/09    BCC forehead     EXC SKIN MALIG 1.1-2CM TRUNK,ARM,LEG  8/06    BCC upper back     HERNIA REPAIR, INGUINAL RT/LT  ~1988    LT        Problem List:  Patient Active Problem List   Diagnosis     Prostate cancer (H)     Hyperlipidemia LDL goal <130     Benign hypertension with chronic kidney disease, stage III (H)     KESHA (obstructive sleep  apnea)     Gout     Diverticulosis     Perennial allergic rhinitis     GERD (gastroesophageal reflux disease)     Hypothyroidism due to acquired atrophy of thyroid     Erectile dysfunction     Hypertension goal BP (blood pressure) < 140/90     Right inguinal hernia     History of basal cell carcinoma     Advance care planning     Paresthesias/numbness, both feet     Vitamin B12 deficiency (non anemic)     Health Care Home     Actinic keratosis     Obesity, Class I, BMI 30-34.9     Recurrent falls     Cortical cataract of both eyes     Nuclear sclerosis of both eyes     Chronic kidney disease, stage 3 (moderate) (H)     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 External use only, Disp: 50 mL, Rfl: 0     Cyanocobalamin 1000 MCG TABS, 1 tablet daily for foot neuropathy (vitamin B12)., Disp: , Rfl:      fluticasone (FLONASE) 50 MCG/ACT nasal spray, USE 1 TO 2 SPRAY(S) IN EACH NOSTRIL ONCE DAILY FOR ALLERGY PREVENTION., Disp: 16 g, Rfl: 2     levothyroxine (SYNTHROID/LEVOTHROID) 100 MCG tablet, Take 1 tablet (100 mcg) by mouth daily for thyroid., Disp: 90 tablet, Rfl: 0     lisinopril (ZESTRIL) 40 MG tablet, Take 1 tablet by mouth once daily for blood pressure, Disp: 90 tablet, Rfl: 0     lovastatin (MEVACOR) 20 MG tablet, TAKE 1 TABLET BY MOUTH IN THE EVENING FOR CHOLESTEROL, Disp: 90 tablet, Rfl: 0     metoprolol succinate ER (TOPROL-XL) 50 MG 24 hr tablet, TAKE 1  TABLET BY MOUTH IN THE EVENING FOR BLOOD PRESSURE, Disp: 90 tablet, Rfl: 0     Multiple Vitamins-Minerals (PRESERVISION AREDS PO), Take 1 capsule by mouth 2 times daily (with meals) (PreserVision AREDS 2), Disp: , Rfl:      omeprazole (PRILOSEC) 20 MG DR capsule, Take 2 capsules (40 mg) by mouth daily for reflux. Take 30-60 minutes before a meal., Disp: 180 capsule, Rfl: 3     VITAMIN D, CHOLECALCIFEROL, PO, Take 1,000 Units by mouth daily , Disp: , Rfl:      polyethylene glycol (MIRALAX) powder, Take 17 g (1 capful) by mouth daily (Patient not taking: Reported on 8/12/2020), Disp: 510 g, Rfl: 1     PHYSICAL EXAM:     Vital signs noted and reviewed by Lam Long PA-C  BP (!) 182/75 (BP Location: Left arm, Patient Position: Sitting, Cuff Size: Adult Regular)   Pulse 51   Temp 96.6  F (35.9  C) (Tympanic)   Resp 20   Wt 93.4 kg (206 lb)   SpO2 99%   BMI 29.56 kg/m       PEFR:    Physical Exam  Vitals signs and nursing note reviewed.   Constitutional:       General: He is not in acute distress.     Appearance: He is well-developed. He is not ill-appearing, toxic-appearing or diaphoretic.   HENT:      Head: Normocephalic and atraumatic.      Right Ear: Hearing, tympanic membrane, ear canal and external ear normal. Tympanic membrane is not perforated, erythematous, retracted or bulging.      Left Ear: Hearing, tympanic membrane, ear canal and external ear normal. Tympanic membrane is not perforated, erythematous, retracted or bulging.      Nose: Nose normal. No mucosal edema or rhinorrhea.      Mouth/Throat:      Pharynx: No oropharyngeal exudate or posterior oropharyngeal erythema.      Tonsils: No tonsillar exudate or tonsillar abscesses. 0 on the right. 0 on the left.   Eyes:      Pupils: Pupils are equal, round, and reactive to light.   Neck:      Musculoskeletal: Normal range of motion and neck supple.   Cardiovascular:      Rate and Rhythm: Normal rate and regular rhythm.      Heart sounds: Normal heart  sounds, S1 normal and S2 normal. Heart sounds not distant. No murmur. No friction rub. No gallop.    Pulmonary:      Effort: Pulmonary effort is normal. No respiratory distress.      Breath sounds: Normal breath sounds. No decreased breath sounds, wheezing, rhonchi or rales.   Abdominal:      General: Bowel sounds are normal. There is no distension.      Palpations: Abdomen is soft.      Tenderness: There is no abdominal tenderness.   Lymphadenopathy:      Cervical: No cervical adenopathy.   Skin:     General: Skin is warm and dry.      Findings: No rash.   Neurological:      Mental Status: He is alert.      Cranial Nerves: No cranial nerve deficit.   Psychiatric:         Attention and Perception: He is attentive.         Speech: Speech normal.         Behavior: Behavior normal. Behavior is cooperative.         Thought Content: Thought content normal.         Judgment: Judgment normal.          Labs:     No results found for any visits on 08/12/20.    Medical Decision Making:    Differential Diagnosis:  HTN     ASSESSMENT:     1. Hypertension goal BP (blood pressure) < 140/90    2. Chronic kidney disease, stage 3 (moderate) (H)           PLAN:     Patient is doing well in clinic today.  He is in no acute distress.  Patient is currently asymptomatic.  I do not feel that ED visit is warranted at this time. Continue to take current BP medications.   Will get CMP, and potassium today.  Patient is hypertensive in clinic today.  Second BP reading was also above goal.  Patient instructed to follow up with PCP in the several days for BP recheck.  Sooner if symptoms worsen.  Staff assisted patient in getting phone visit with his PCP tomorrow.  Worrisome symptoms discussed with instructions to go to the ED.  Patient verbalized understanding and agreed with this plan.     Lam Long PA-C  8/12/2020, 2:44 PM

## 2020-08-12 NOTE — PATIENT INSTRUCTIONS
Patient Education     Discharge Instructions for High Blood Pressure (Hypertension)  You have been diagnosed with high blood pressure (also called hypertension). This means the force of blood against your artery walls is too strong. It also means your heart is working hard to move blood. High blood pressure usually has no symptoms, but over time, it can cause serious health problems. High blood pressure raises your risk for heart attack, stroke, heart failure, kidney disease, and blindness. With help from your doctor, you can manage your blood pressure and protect your health.  Blood pressure measurements are given as 2 numbers. Systolic blood pressure is the upper number. This is the pressure when the heart contracts. Diastolic blood pressure is the lower number. This is the pressure when the heart relaxes between beats.  Blood pressure is categorized as normal, elevated, or stage 1 or stage 2 high blood pressure:    Normal blood pressure is systolic of less than 120 and diastolic of less than 80 (120/80)    Elevated blood pressure is systolic of 120 to 129 and diastolic less than 80    Stage 1 high blood pressure is systolic is 130 to 139 or diastolic between 80 to 89    Stage 2 high blood pressure is when systolic is 140 or higher or the diastolic is 90 or higher  Taking medicine    Learn to take your own blood pressure. Keep a record of your results. Ask your doctor which readings mean that you need medical attention.    Take your blood pressure medicine exactly as directed. Don t skip doses. Missing doses can cause your blood pressure to get out of control.    If you do miss a dose (or doses) check with your healthcare provider about what to do.    Don't take medicines that contain heart stimulants, including over-the-counter medicines. Check for warnings about high blood pressure on the label. Ask the pharmacist before purchasing something you haven't used before    Check with your doctor or pharmacist  "before taking a decongestant. Some decongestants can worsen high blood pressure.  Lifestyle changes    Maintain a healthy weight. Get help to lose any extra pounds.    Cut back on salt.  ? Limit canned, dried, packaged, and fast foods.  ? Don t add salt to your food at the table.  ? Season foods with herbs instead of salt when you cook.  ? Request no added salt when you go to a restaurant.  ? The American Heart Association (AHA) says the \"ideal\" amount of sodium is no more than 1,500 mg a day.  But because Americans eat so much salt, you can make a positive change by cutting back to even 2,400 mg of sodium a day.     Follow the DASH (Dietary Approaches to Stop Hypertension) eating plan. This plan recommends vegetables, fruits, whole gains, and other heart healthy foods.    Begin an exercise program. Ask your healthcare provider how to get started. The AHA recommends aerobic exercise 3 to 4 times a week for an average of 40 minutes at a time, with your provider's approval. Simple activities like walking or gardening can help.    Break the smoking habit. Enroll in a stop-smoking program to improve your chances of success. Ask your healthcare provider about programs and medicines to help you stop smoking.    Limit drinks that contain caffeine such as coffee, black or green tea, and cola to 2 per day.    Never take stimulants such as amphetamines or cocaine. These drugs can be deadly for someone with high blood pressure.    Control your stress. Learn ways to manage stress.    Limit alcohol to no more than 1 drink a day for women and 2 drinks a day for men.  Follow-up care  Make a follow-up appointment as directed.  When to call your healthcare provider  Call your healthcare provider right away if you have any of the following:    Chest pain or shortness of breath (call 911)    Moderate to severe headache    Weakness in the muscles of your face, arms, or legs    Trouble speaking    Extreme " drowsiness    Confusion    Fainting or dizziness    Pulsating or rushing sound in your ears    Unexplained nosebleed    Weakness, tingling, or numbness of your face, arms, or legs    Change in vision    Blood pressure measured at home that is greater than 180/110   Date Last Reviewed: 4/27/2016 2000-2019 [employer]. [address] [place] [id number]. All rights reserved. This information is not intended as a substitute for professional medical care. Always follow your healthcare professional's instructions.

## 2020-08-13 ENCOUNTER — VIRTUAL VISIT (OUTPATIENT)
Dept: FAMILY MEDICINE | Facility: CLINIC | Age: 85
End: 2020-08-13
Payer: COMMERCIAL

## 2020-08-13 VITALS — DIASTOLIC BLOOD PRESSURE: 78 MMHG | HEART RATE: 48 BPM | SYSTOLIC BLOOD PRESSURE: 153 MMHG

## 2020-08-13 DIAGNOSIS — I12.9 BENIGN HYPERTENSION WITH CHRONIC KIDNEY DISEASE, STAGE III (H): Primary | ICD-10-CM

## 2020-08-13 DIAGNOSIS — N18.30 BENIGN HYPERTENSION WITH CHRONIC KIDNEY DISEASE, STAGE III (H): Primary | ICD-10-CM

## 2020-08-13 PROCEDURE — 99207 ZZC NO BILLABLE SERVICE THIS VISIT: CPT | Performed by: FAMILY MEDICINE

## 2020-08-13 RX ORDER — METOPROLOL SUCCINATE 50 MG/1
25 TABLET, EXTENDED RELEASE ORAL DAILY
Start: 2020-08-13 | End: 2020-09-29

## 2020-08-13 RX ORDER — AMLODIPINE BESYLATE 5 MG/1
5 TABLET ORAL DAILY
Qty: 90 TABLET | Refills: 0 | Status: SHIPPED | OUTPATIENT
Start: 2020-08-13 | End: 2020-08-13

## 2020-08-13 RX ORDER — DOXAZOSIN 1 MG/1
TABLET ORAL
Qty: 70 TABLET | Refills: 0 | Status: SHIPPED | OUTPATIENT
Start: 2020-08-13 | End: 2020-09-29

## 2020-08-13 ASSESSMENT — PAIN SCALES - GENERAL: PAINLEVEL: NO PAIN (0)

## 2020-08-13 NOTE — Clinical Note
Call pt (or wife) to schedule ancillary BP and pulse check for 3-4 weeks, folowed by virtual visit 1-2 days later.   Then mail AVS.

## 2020-08-13 NOTE — PROGRESS NOTES
"James Lama is a 89 year old male who is being evaluated via a billable telephone visit.      The patient has been notified of following:     \"This telephone visit will be conducted via a call between you and your physician/provider. We have found that certain health care needs can be provided without the need for a physical exam.  This service lets us provide the care you need with a short phone conversation.  If a prescription is necessary we can send it directly to your pharmacy.  If lab work is needed we can place an order for that and you can then stop by our lab to have the test done at a later time.    Telephone visits are billed at different rates depending on your insurance coverage. During this emergency period, for some insurers they may be billed the same as an in-person visit.  Please reach out to your insurance provider with any questions.    If during the course of the call the physician/provider feels a telephone visit is not appropriate, you will not be charged for this service.\"    Patient has given verbal consent for Telephone visit?  Yes    What phone number would you like to be contacted at? [phone]    How would you like to obtain your AVS? Mail a copy    Subjective     James Lama is a 89 year old male who presents via phone visit today for the following health issues. He is accompanied by his wife.    HPI    Hypertension Follow-up- SEEN IN URGENT CARE 8/12/20       Do you check your blood pressure regularly outside of the clinic? Yes although not so much lately    Are you following a low salt diet? Yes, usually (lately, has been receive free food from somme source which seems to processed foods [which, on discussion, we suspect may be high in sodium]    Are your blood pressures ever more than 140 on the top number (systolic) OR more   than 90 on the bottom number (diastolic), for example 140/90? Yes      How many servings of fruits and vegetables do you eat daily?  0-1    On " average, how many sweetened beverages do you drink each day (Examples: soda, juice, sweet tea, etc.  Do NOT count diet or artificially sweetened beverages)?   0    How many days per week do you exercise enough to make your heart beat faster? none    How many minutes a day do you exercise enough to make your heart beat faster? none    How many days per week do you miss taking your medication? 0    Past medical, family, and social histories, medications, and allergies are reviewed and updated in Epic.  Allergies: Acrylate copolymer    Review Of Systems:   Constitutional, HEENT, cardiovascular, pulmonary, gi and gu systems are negative, except as otherwise noted.    Objective:     Reported vitals:BP (!) 153/78   Pulse (!) 48    healthy, alert and no distress  RESP: No cough, no audible wheezing, able to talk in full sentences.  PSYCH: Alert and oriented times 3; coherent speech, normal   rate and volume, able to articulate logical thoughts, able   to abstract reason, no tangential thoughts, no hallucinations   or delusions, and affect is normal and pleasant  The remainder of the exam cannot be completed due to this being a telephone visit.      Diagnostic Test Results:     Ref. Range 8/12/2020 15:13   Sodium Latest Ref Range: 133 - 144 mmol/L 141   Potassium Latest Ref Range: 3.4 - 5.3 mmol/L 4.7   Chloride Latest Ref Range: 94 - 109 mmol/L 108   Carbon Dioxide Latest Ref Range: 20 - 32 mmol/L 27   Urea Nitrogen Latest Ref Range: 7 - 30 mg/dL 26   Creatinine Latest Ref Range: 0.66 - 1.25 mg/dL 1.20   GFR Estimate Latest Ref Range: >60 mL/min/1.73_m2 52 (L)   Calcium Latest Ref Range: 8.5 - 10.1 mg/dL 9.3   Anion Gap Latest Ref Range: 3 - 14 mmol/L 6   Albumin Latest Ref Range: 3.4 - 5.0 g/dL 3.6   Protein Total Latest Ref Range: 6.8 - 8.8 g/dL 7.2   Bilirubin Total Latest Ref Range: 0.2 - 1.3 mg/dL 0.4   Alkaline Phosphatase Latest Ref Range: 40 - 150 U/L 89   ALT Latest Ref Range: 0 - 70 U/L 17   AST Latest Ref  Range: 0 - 45 U/L 16   Glucose Latest Ref Range: 70 - 99 mg/dL 90       =====    ASSESSMENT/PLAN:  (I12.9,  N18.3) Benign hypertension with chronic kidney disease, stage III (H)  (primary encounter diagnosis)  Comment: Uncontrolled, improved from yesterday.  Because his heart rate is low, I do not want to increase his metoprolol.  Further review shows that we stop his amlodipine 2 years ago for suspected lower extremity edema (he has not had a problem since stopping it, so that is not an option today).  Plan: metoprolol succinate ER (TOPROL-XL) 50 MG 24 hr        tablet, doxazosin (CARDURA) 1 MG tablet,                Continue lisinopril.  Decrease metoprolol to 25 mg (half tablet).  Start doxazosin, ramping up by 1 mg/week, as directed on the prescription. Return in about 26 days (around 9/8/2020) for blood pressure check (ancillary visit), followed by virtual visit with Dr. Jansen.      Phone call duration:  23 minutes    Juan Carlos Jansen MD

## 2020-08-13 NOTE — PATIENT INSTRUCTIONS
At [facility], we strive to deliver an exceptional experience to you, every time we see you. If you receive a survey, please complete it as we do value your feedback.  If you have MyChart, you can expect to receive results automatically within 24 hours of their completion.  Your provider will send a note interpreting your results as well.   If you do not have MyChart, you should receive your results in about a week by mail.    Your care team:                            Family Medicine Internal Medicine   MD Trino Lombardo MD Shantel Branch-Fleming, MD Srinivasa Vaka, MD Katya Georgiev PA-C Megan Hill, APRN CNP    Sergo Rolon, MD Pediatrics   Yimi Post, PAChayC  Chitra Schwarz, CNP MD Yolie Carlos APRN CNP   MD Chelo Ansari MD Deborah Mielke, MD Tricia Walker, APRN CNP  Salima Chen, PAChayC  Dalia Ruiz, CNP  MD Angie Mckeon MD Angela Wermerskirchen, MD      Clinic hours: Monday - Thursday 7 am-7 pm; Fridays 7 am-5 pm.   Urgent care: Monday - Friday 11 am-9 pm; Saturday and Sunday 9 am-5 pm.    Clinic: [phone]       [facility]: Monday - Thursday 8 am - 7 pm; Friday 8 am - 6 pm  [facility]: [phone]     Use [email or web address] for 24/7 diagnosis and treatment of dozens of conditions.

## 2020-08-18 DIAGNOSIS — E78.5 HYPERLIPIDEMIA LDL GOAL <130: ICD-10-CM

## 2020-08-19 RX ORDER — LOVASTATIN 20 MG
TABLET ORAL
Qty: 90 TABLET | Refills: 1 | Status: SHIPPED | OUTPATIENT
Start: 2020-08-19 | End: 2021-02-23

## 2020-08-19 NOTE — TELEPHONE ENCOUNTER
Prescription approved per [facility].    Sita Griffin RN

## 2020-09-21 DIAGNOSIS — E03.4 HYPOTHYROIDISM DUE TO ACQUIRED ATROPHY OF THYROID: ICD-10-CM

## 2020-09-21 DIAGNOSIS — N18.30 BENIGN HYPERTENSION WITH CHRONIC KIDNEY DISEASE, STAGE III (H): ICD-10-CM

## 2020-09-21 DIAGNOSIS — I12.9 BENIGN HYPERTENSION WITH CHRONIC KIDNEY DISEASE, STAGE III (H): ICD-10-CM

## 2020-09-23 RX ORDER — LEVOTHYROXINE SODIUM 100 UG/1
TABLET ORAL
Qty: 90 TABLET | Refills: 1 | Status: SHIPPED | OUTPATIENT
Start: 2020-09-23 | End: 2021-04-12

## 2020-09-23 NOTE — TELEPHONE ENCOUNTER
Routing refill request to provider for review/approval because:  Failed BP reading.    Other filled per protocol.    Susy Turner RN, [facility] Triage

## 2020-09-24 RX ORDER — LISINOPRIL 40 MG/1
TABLET ORAL
Qty: 90 TABLET | Refills: 0 | Status: SHIPPED | OUTPATIENT
Start: 2020-09-24 | End: 2021-01-01

## 2020-09-24 NOTE — TELEPHONE ENCOUNTER
This writer attempted to contact pt on 09/24/20      Reason for call schedule BP check or follow below workflow and schedule virtual visit and left message with female that answered the phone.      If patient calls back:   Schedule virtual visit appointment within 2 weeks with PCP, document that pt called and close encounter     Per clinic workflow, patient was given the below options to obtain a blood pressure and asked to contact the clinic with the reading.     A. Patient can use home blood pressure cuff   B. Patient goes to [facility] or other pharmacy to get a Blood pressure  C. Set up an Ancillary appointment if available for a Blood Pressure.     If patient calls back:   Obtain patient reported blood pressure and route back to the appropriate team pool     Fani Morales

## 2020-09-28 NOTE — TELEPHONE ENCOUNTER
"Pt scheduled a telephone visit 08/29/20 with Dr. Jansen.  Pt reported his Blood pressure has been \"back to normal\"  since he has cut out eating salty foods.  His most recent read last week was 142/62 per patient.  I advised pt to have a record of his latest BP readings for the virtual visit tomorrow.  Routing to provider with reported BP reading     BRANDI Rasmussen.    "

## 2020-09-29 ENCOUNTER — VIRTUAL VISIT (OUTPATIENT)
Dept: FAMILY MEDICINE | Facility: CLINIC | Age: 85
End: 2020-09-29
Payer: COMMERCIAL

## 2020-09-29 DIAGNOSIS — I12.9 BENIGN HYPERTENSION WITH CHRONIC KIDNEY DISEASE, STAGE III (H): ICD-10-CM

## 2020-09-29 DIAGNOSIS — N18.30 BENIGN HYPERTENSION WITH CHRONIC KIDNEY DISEASE, STAGE III (H): ICD-10-CM

## 2020-09-29 PROCEDURE — 99213 OFFICE O/P EST LOW 20 MIN: CPT | Mod: 95 | Performed by: FAMILY MEDICINE

## 2020-09-29 RX ORDER — DOXAZOSIN 1 MG/1
TABLET ORAL
Qty: 70 TABLET | Refills: 0 | Status: SHIPPED | OUTPATIENT
Start: 2020-09-29 | End: 2020-11-02

## 2020-09-29 RX ORDER — METOPROLOL SUCCINATE 50 MG/1
50 TABLET, EXTENDED RELEASE ORAL DAILY
Qty: 90 TABLET | Refills: 1 | Status: SHIPPED | OUTPATIENT
Start: 2020-09-29 | End: 2021-04-23

## 2020-09-29 NOTE — PATIENT INSTRUCTIONS
Patient Education     Tips for a Low-Sodium Diet  If you have high blood pressure, heart failure, liver problems or kidney problems, you may need to watch your sodium intake. Too much sodium can cause thirst and shortness of breath. It can also make your body retain extra fluids.  You should limit the amount of sodium in your diet to mg per day.  Sodium is found in many foods. Most of our sodium comes from  processed  foods like canned soups, lunch meats and TV dinners.  A major source of sodium is salt, or sodium chloride. Salt is often used to preserve foods (extend their shelf life). We have gotten used to the taste of salt in our foods. When you start to limit your salt intake, you will notice the lack of salt. Give yourself time to adjust to the change.  Tips    To keep track of your sodium intake, write down the amount of sodium you eat for a of couple days. This gives you a good idea of which foods are high in sodium--and where you can cut back.    Do not add salt while cooking or at the table. In recipes, you can often use half the amount of salt without giving up flavor.    Do not add salt to water when making rice, pasta or potatoes.    Do not use lemon pepper--this is made with salt.    Use spices and herbs without the word  salt  in their names. Use herb blends like Mrs. Doe.    When eating vegetables, meats, poultry or fish, choose fresh or frozen instead of canned foods.    Eat more homemade foods that are made from scratch. Avoid boxed rice, noodles or potato dishes--these often contain salty seasonings.    Choose foods with the least amount of packaging. These are often lower in sodium .    Read food labels to learn the sodium content for suggested serving sizes. Choose foods with the least amount of sodium.  ? Choose foods labeled  low sodium.  These have less than 140 mg of sodium per serving.    Always double-check serving sizes. If you eat two servings of a food, you will get twice as much  sodium.    When you go out to eat, ask that foods be made without added salt. Ask for condiments on the side, so you can control how much you use.    You will find foods with less sodium if you shop along the outer walls of the grocery store.    Do not use a salt substitute without your doctor s okay. Some products (like Hyde Lite Salt) contain potassium. If you are taking certain medicines, these products could raise the level of potassium in your blood.  High-sodium foods    Salt or kosher salt (one teaspoon = 2,300 mg of sodium)    Seasonings (lemon pepper, garlic salt, sea salt, seasoning salt, etc.)    Meat tenderizers and marinades    Packaged sauces or gravies    Snack foods (chips, crackers, pork rinds, salted nuts)    Cheese (natural and processed)    Cottage cheese    Fast-food and restaurant meals    Most canned vegetables and vegetable juices    Pickled and cured foods (pickles, olives, sauerkraut)    Condiments (BBQ sauce, soy sauce, teriyaki sauce, steak sauce, salad dressing, ketchup, etc.)    Canned or boxed side dishes, such as macaroni and cheese, ramen noodles, Hamburger [place] and Rice-A-Oswaldo    Frozen meals with more than 500 mg of sodium per serving    Monosodium glutamate (MSG)    Processed meats (bologna, ham, elaine) and canned meats (SPAM, corned beef)    Soups and bouillon (canned, frozen or dried)    Canned tomato products (juice, spaghetti sauce, etc.)    Sports drinks such as Gatorade or Powerade    Foods covered in sauce, gravy or other coatings (broccoli with cheese sauce, chicken fingers, etc.)    Biscuits and refrigerated rolls or breads         Patient Education     Low-Salt Choices  Eating salt (sodium) can make your body retain too much water. Excess water makes your heart work harder. Canned, packaged, and frozen foods are easy to prepare. But they are often high in sodium. Here are some ideas for low-salt foods you can easily make yourself.    For breakfast    Fruit or 100%  fruit juice    Whole-wheat bread or an English muffin. Look for sodium content on Nutrition Facts labels.    Low-fat milk or yogurt    Unsalted eggs    Shredded wheat    Corn tortillas    Unsalted steamed rice    Regular (not instant) hot cereal, made without salt  Stay away from:    Sausage, elaine, and ham    Flour tortillas    Packaged muffins, pancakes, and biscuits    Instant hot cereals    Cottage cheese    For lunch and dinner    Fresh fish, chicken, turkey, or meat--baked, broiled, or roasted without salt    Dry beans, cooked without salt    Tofu, stir-fried without salt    Unsalted fresh fruit and vegetables, or frozen or canned fruit and vegetables with no added salt  Stay away from:    Lunch or deli meat that is cured or smoked    Cheese    Tomato juice and ketchup    Canned vegetables, soups, and fish not labeled as no-salt-added or reduced sodium    Packaged gravies and sauces    Olives, pickles, and relish    Bottled salad dressings    For snacks and desserts    Yogurt    Unsalted, air-popped popcorn    Unsalted nuts or seeds  Stay away from:    Pies and cakes    Packaged dessert mixes    Pizza    Canned and packaged puddings    Pretzels, chips, crackers, and nuts--unless the label says unsalted    Date Last Reviewed: 6/1/2017 2000-2019 [employer]. [address] [place] [address]. All rights reserved. This information is not intended as a substitute for professional medical care. Always follow your healthcare professional's instructions.           Patient Education     Low-Salt Diet  This diet removes foods that are high in salt. It also limits the amount of salt you use when cooking. It is most often used for people with high blood pressure, edema (fluid retention), and kidney, liver, or heart disease.  Table salt contains the mineral sodium. Your body needs sodium to work normally. But too much sodium can make your health problems worse. Your healthcare provider is  recommending a low-salt (also called low-sodium) diet for you. Your total daily allowance of salt is 1,500 to 2,300 milligrams (mg). It is less than 1 teaspoon of table salt. This means you can have only about 500 to 700 mg of sodium at each meal. People with certain health problems should limit salt intake to the lower end of the recommended range.    When you cook, don t add much salt. If you can cook without using salt, even better. Don t add salt to your food at the table.  When shopping, read food labels. Salt is often called sodium on the label. Choose foods that are salt-free, low salt, or very low salt. Note that foods with reduced salt may not lower your salt intake enough.    Beans, potatoes, and pasta  Ok: Dry beans, split peas, lentils, potatoes, rice, macaroni, pasta, spaghetti without added salt  Avoid: Potato chips, tortilla chips, and similar products  Breads and cereals  Ok: Low-sodium breads, rolls, cereals, and cakes; low-salt crackers, matzo crackers  Avoid: Salted crackers, pretzels, popcorn, [place] toast, pancakes, muffins  Dairy  Ok: Milk, chocolate milk, hot chocolate mix, low-salt cheeses, and yogurt  Avoid: Processed cheese and cheese spreads; Roquefort, Camembert, and cottage cheese; buttermilk, instant breakfast drink  Desserts  Ok: Ice cream, frozen yogurt, juice bars, gelatin, cookies and pies, sugar, honey, jelly, hard candy  Avoid: Most pies, cakes and cookies prepared or processed with salt; instant pudding  Drinks  Ok: Tea, coffee, fizzy (carbonated) drinks, juices  Avoid: Flavored coffees, electrolyte replacement drinks, sports drinks  Meats  Ok: All fresh meat, fish, poultry, low-salt tuna, eggs, egg substitute  Avoid: Smoked, pickled, brine-cured, or salted meats and fish. This includes elaine, chipped beef, corned beef, hot dogs, deli meats, ham, kosher meats, salt pork, sausage, canned tuna, salted codfish, smoked salmon, herring, sardines, or anchovies.  Seasonings and  spices  Ok: Most seasonings are okay. Good substitutes for salt include: fresh herb blends, hot sauce, lemon, garlic, chance, vinegar, dry mustard, parsley, cilantro, horseradish, tomato paste, regular margarine, mayonnaise, unsalted butter, cream cheese, vegetable oil, cream, low-salt salad dressing and gravy.  Avoid: Regular ketchup, relishes, pickles, soy sauce, teriyaki sauce, Worcestershire sauce, BBQ sauce, tartar sauce, meat tenderizer, chili sauce, regular gravy, regular salad dressing, salted butter  Soups  Ok: Low-salt soups and broths made with allowed foods  Avoid: Bouillon cubes, soups with smoked or salted meats, regular soup and broth  Vegetables  Ok: Most vegetables are okay; also low-salt tomato and vegetable juices  Avoid: Sauerkraut and other brine-soaked vegetables; pickles and other pickled vegetables; tomato juice, olives  Date Last Reviewed: 8/1/2016 2000-2019 [employer]. [address] [place] [address], [place] [address]. All rights reserved. This information is not intended as a substitute for professional medical care. Always follow your healthcare professional's instructions.

## 2020-09-29 NOTE — PROGRESS NOTES
"James Lama is a 89 year old male who is being evaluated via a billable telephone visit.      The patient has been notified of following:     \"This telephone visit will be conducted via a call between you and your physician/provider. We have found that certain health care needs can be provided without the need for a physical exam.  This service lets us provide the care you need with a short phone conversation.  If a prescription is necessary we can send it directly to your pharmacy.  If lab work is needed we can place an order for that and you can then stop by our lab to have the test done at a later time.    Telephone visits are billed at different rates depending on your insurance coverage. During this emergency period, for some insurers they may be billed the same as an in-person visit.  Please reach out to your insurance provider with any questions.    If during the course of the call the physician/provider feels a telephone visit is not appropriate, you will not be charged for this service.\"    Patient has given verbal consent for Telephone visit?  Yes    What phone number would you like to be contacted at? [phone]    How would you like to obtain your AVS? Mail a copy    Subjective     James Lama is a 89 year old male who presents via phone visit today for the following health issues. He is accompanied by his wife.    HPI    Hypertension Follow-up      Do you check your blood pressure regularly outside of the clinic? Yes     Are you following a low salt diet? Yes    Are your blood pressures ever more than 140 on the top number (systolic) OR more   than 90 on the bottom number (diastolic), for example 140/90? Yes, sometimes get higher readings. Since last visit, he did not start the doxazosin prescription.  He picked it up, but he did not start it because his blood pressures were getting better on their own for a while      How many servings of fruits and vegetables do you eat daily?  2-3    On " average, how many sweetened beverages do you drink each day (Examples: soda, juice, sweet tea, etc.  Do NOT count diet or artificially sweetened beverages)?   0    How many days per week do you exercise enough to make your heart beat faster? 3 or less    How many minutes a day do you exercise enough to make your heart beat faster? 9 or less    How many days per week do you miss taking your medication? 0      Past medical, family, and social histories, medications, and allergies are reviewed and updated in Epic.  Allergies: Acrylate copolymer    Review Of Systems:   CONSTITUTIONAL: NEGATIVE for fever, chills, change in weight  ENT/MOUTH: NEGATIVE for ear, mouth and throat problems  RESP: NEGATIVE for significant cough or SOB  CV: NEGATIVE for chest pain, palpitations or peripheral edema    Objective:         There were no vitals taken for this visit., since this is a telephone visit.  healthy, alert and no distress  RESP: No cough, no audible wheezing, able to talk in full sentences.  PSYCH: Alert and oriented times 3; coherent speech, normal   rate and volume, able to articulate logical thoughts, able   to abstract reason, no tangential thoughts, no hallucinations   or delusions, and affect is normal and pleasant  The remainder of the exam cannot be completed due to this being a telephone visit.    =====    ASSESSMENT/PLAN:  (I12.9,  N18.3) Benign hypertension with chronic kidney disease, stage III  Comment: Uncontrolled.  Plan: doxazosin (CARDURA) 1 MG tablet, metoprolol         succinate ER (TOPROL-XL) 50 MG 24 hr tablet        Handouts provided regarding low-sodium diet.  Start doxazosin, increasing by 1 mg/week until he is taking 4 mg/day.  Continue metoprolol and lisinopril. Return in about 24 days (around 10/23/2020) for blood pressure check.      Phone call duration:  17 minutes    Juan Carlos Jansen MD

## 2020-10-01 DIAGNOSIS — K21.9 GASTROESOPHAGEAL REFLUX DISEASE WITHOUT ESOPHAGITIS: ICD-10-CM

## 2020-10-01 NOTE — TELEPHONE ENCOUNTER
Resent Rx to pharmacy because of failed transmission to pharmacy.    Susy Turner RN, [facility] Triage

## 2020-10-01 NOTE — TELEPHONE ENCOUNTER
Prescription approved per [facility] Refill Protocol.    Sita Griffin RN

## 2020-10-22 ENCOUNTER — TELEPHONE (OUTPATIENT)
Dept: FAMILY MEDICINE | Facility: CLINIC | Age: 85
End: 2020-10-22

## 2020-10-22 NOTE — TELEPHONE ENCOUNTER
Reason for Call:  Other prescription    Detailed comments: Patient calling to report he has stopped taking  doxazosin (CARDURA) 1 MG tablet  It is messing with his stomach so bad he had to stop taking it. He would like to speak with Dr or Nurse about what to do next.       Phone Number Patient can be reached at: Cell number on file:    Telephone Information:   Mobile [phone]       Best Time: Any    Can we leave a detailed message on this number? YES    Call taken on 10/22/2020 at 2:47 PM by Hamzah Mcmillan

## 2020-10-26 NOTE — TELEPHONE ENCOUNTER
Reason for Call:  Other call back and prescription    Detailed comments: Conor still waiting for a call about his new medication. He stopped taking it because it was making him sick. Please call to advise. Thank you     Phone Number Patient can be reached at: [phone] (R)    Best Time: Any    Can we leave a detailed message on this number? YES    Call taken on 10/26/2020 at 10:32 AM by Jeff Omalley

## 2020-10-29 NOTE — TELEPHONE ENCOUNTER
He is scheduled to see me on 11/2/2020.  He can do nothing, and we wait and see what his blood pressures like without the doxazosin.      However, if he did not develop the symptoms until he was on a higher dose, but tolerated a lower dose, he can just restart the doxazosin at the lower tolerated dose (and then we will check his pressure on that dose).

## 2020-10-29 NOTE — TELEPHONE ENCOUNTER
This writer attempted to contact patient on 10/29/20      Reason for call provider's  and left message.      If patient calls back:   Registered Nurse called. Follow Triage Call workflow        Rosanna Mishra RN

## 2020-10-30 NOTE — TELEPHONE ENCOUNTER
Patient informed on the below.    When he was on one tablet of doxazosin daily, his nausea was mild in the morning and was gone by lunch time or after he would eat something.  He was nauseous all day when was taking 3 tabs a day.  He didn't check his blood pressure today yet, but yesterday was 135/56.      Hollie Burns RN

## 2020-11-02 ENCOUNTER — VIRTUAL VISIT (OUTPATIENT)
Dept: FAMILY MEDICINE | Facility: CLINIC | Age: 85
End: 2020-11-02
Payer: COMMERCIAL

## 2020-11-02 DIAGNOSIS — N18.30 BENIGN HYPERTENSION WITH CHRONIC KIDNEY DISEASE, STAGE III (H): Primary | ICD-10-CM

## 2020-11-02 DIAGNOSIS — I12.9 BENIGN HYPERTENSION WITH CHRONIC KIDNEY DISEASE, STAGE III (H): Primary | ICD-10-CM

## 2020-11-02 PROCEDURE — 99214 OFFICE O/P EST MOD 30 MIN: CPT | Mod: 95 | Performed by: FAMILY MEDICINE

## 2020-11-02 RX ORDER — DOXAZOSIN 1 MG/1
TABLET ORAL
Start: 2020-11-02

## 2020-11-02 NOTE — PROGRESS NOTES
"James Lama is a 89 year old male who is being evaluated via a billable telephone visit.      The patient has been notified of following:     \"This telephone visit will be conducted via a call between you and your physician. We have found that certain health care needs can be provided without the need for a physical exam.  This service lets us provide the care you need with a short phone conversation.  If a prescription is necessary we can send it directly to your pharmacy.  If lab work is needed we can place an order for that and you can then stop by our lab to have the test done at a later time.    Telephone visits are billed at different rates depending on your insurance coverage. During this emergency period, for some insurers they may be billed the same as an in-person visit.  Please reach out to your insurance provider with any questions.  If during the course of the call the physician feels a telephone visit is not appropriate, you will not be charged for this service.\"    Patient has given verbal consent for Telephone visit?  Yes, as before    What phone number would you like to be contacted at? [phone] (busy 10:00)    How would you like to obtain your AVS? Mail a copy      SUBJECTIVE:    James Lama is a 89 year old male who prevents via telephone visit today for the following issue(s): He is accompanied by his wife.     HPI:    Hypertension Follow-up      Do you check your blood pressure regularly outside of the clinic? Yes       Are you following a low salt diet? Yes    Are your blood pressures ever more than 140 on the top number (systolic) OR more than 90 on the bottom number (diastolic), for example 140/90? Yes , yesterday 141, but most are in 130s    When taking 3 tabs per day, he felt nauseated and tired, when he stopped the med, the side effects went away.  He denies any other side effects             Past medical, family, and social histories, medications, and allergies are reviewed " and updated in Epic.  Allergies: Acrylate copolymer    Review Of Systems:   CONSTITUTIONAL: NEGATIVE for fever, chills, change in weight  ENT/MOUTH: NEGATIVE for ear, mouth and throat problems  RESP: NEGATIVE for significant cough or SOB  CV: NEGATIVE for chest pain, palpitations or peripheral edema    Objective:         There were no vitals taken for this visit., since this is a telephone visit.  healthy, alert and no distress  RESP: No cough, no audible wheezing, able to talk in full sentences.  PSYCH: Alert and oriented times 3; coherent speech, normal   rate and volume, able to articulate logical thoughts, able   to abstract reason, no tangential thoughts, no hallucinations   or delusions, and affect is normal and pleasant  The remainder of the exam cannot be completed due to this being a telephone visit.    =====    ASSESSMENT/PLAN:  (I12.9,  N18.30) Benign hypertension with chronic kidney disease, stage III  (primary encounter diagnosis)  Comment: Blood pressures a little improved since last visit, now borderline.  Nausea when taking more than 2 mg of doxazosin.     Plan: doxazosin (CARDURA) 1 MG tablet       Resume doxazosin at 1 mg daily for 2 weeks, then 2 mg daily until he follows up with me. Return in about 5 weeks (around 12/7/2020) for blood pressure check.   (Or sooner if he has an issue again    Phone call duration:  14 minutes    Juan Carlos Jansen MD

## 2020-12-20 DIAGNOSIS — I12.9 BENIGN HYPERTENSION WITH CHRONIC KIDNEY DISEASE, STAGE III (H): ICD-10-CM

## 2020-12-20 DIAGNOSIS — N18.30 BENIGN HYPERTENSION WITH CHRONIC KIDNEY DISEASE, STAGE III (H): ICD-10-CM

## 2020-12-21 NOTE — TELEPHONE ENCOUNTER
"Requested Prescriptions   Pending Prescriptions Disp Refills     lisinopril (ZESTRIL) 40 MG tablet [Pharmacy Med Name: Lisinopril 40 MG Oral Tablet] 90 tablet 0     Sig: Take 1 tablet by mouth once daily for blood pressure       ACE Inhibitors (Including Combos) Protocol Failed - 12/20/2020 11:10 AM        Failed - Blood pressure under 140/90 in past 12 months     BP Readings from Last 3 Encounters:   08/13/20 (!) 153/78   08/12/20 (!) 142/60   08/12/20 (!) 191/75                 Passed - Recent (12 mo) or future (30 days) visit within the authorizing provider's specialty     Patient has had an office visit with the authorizing provider or a provider within the authorizing providers department within the previous 12 mos or has a future within next 30 days. See \"Patient Info\" tab in inbasket, or \"Choose Columns\" in Meds & Orders section of the refill encounter.              Passed - Medication is active on med list        Passed - Patient is age 18 or older        Passed - Normal serum creatinine on file in past 12 months     Recent Labs   Lab Test 08/12/20  1513   CR 1.20       Ok to refill medication if creatinine is low          Passed - Normal serum potassium on file in past 12 months     Recent Labs   Lab Test 08/12/20  1513   POTASSIUM 4.7                Routing refill request to provider for review/approval because:  BP        "

## 2021-01-01 RX ORDER — LISINOPRIL 40 MG/1
TABLET ORAL
Qty: 90 TABLET | Refills: 0 | Status: SHIPPED | OUTPATIENT
Start: 2021-01-01 | End: 2022-01-04

## 2021-02-22 DIAGNOSIS — E78.5 HYPERLIPIDEMIA LDL GOAL <130: ICD-10-CM

## 2021-02-23 RX ORDER — LOVASTATIN 20 MG
TABLET ORAL
Qty: 90 TABLET | Refills: 1 | Status: SHIPPED | OUTPATIENT
Start: 2021-02-23 | End: 2021-09-05

## 2021-06-29 DIAGNOSIS — E03.4 HYPOTHYROIDISM DUE TO ACQUIRED ATROPHY OF THYROID: ICD-10-CM

## 2021-06-30 RX ORDER — LEVOTHYROXINE SODIUM 100 UG/1
TABLET ORAL
Qty: 90 TABLET | Refills: 0 | Status: SHIPPED | OUTPATIENT
Start: 2021-06-30

## 2021-07-13 DIAGNOSIS — K21.9 GASTROESOPHAGEAL REFLUX DISEASE WITHOUT ESOPHAGITIS: ICD-10-CM

## 2021-09-02 DIAGNOSIS — E78.5 HYPERLIPIDEMIA LDL GOAL <130: ICD-10-CM

## 2021-09-02 NOTE — TELEPHONE ENCOUNTER
Routing refill request to provider for review/approval because:  Labs not current:  Lipids    Jacklyn Sargent BSN, RN

## 2021-09-05 RX ORDER — LOVASTATIN 20 MG
TABLET ORAL
Qty: 30 TABLET | Refills: 0 | Status: SHIPPED | OUTPATIENT
Start: 2021-09-05 | End: 2021-10-19

## 2021-09-05 NOTE — TELEPHONE ENCOUNTER
Let patient know he needs to be seen for further refills after this.  His blood pressure needs to be updated, he has not had labs done for over a year, and I have not seen him since 2019.

## 2021-09-22 NOTE — TELEPHONE ENCOUNTER
Pt has [employer] insurance plan and should no longer be scheduled for anything at [facility].     Pt confirmed that he will move this prescription to be refilled by his new doctor.    Pt understood and has already transferred care to another clinic. I will remove Justyna as PCP from his profile.    Thank you,  Whitney Alvarado,   [facility]

## 2021-09-30 DIAGNOSIS — E78.5 HYPERLIPIDEMIA LDL GOAL <130: ICD-10-CM

## 2021-10-19 RX ORDER — LOVASTATIN 20 MG
20 TABLET ORAL AT BEDTIME
Qty: 30 TABLET | Refills: 0 | Status: SHIPPED | OUTPATIENT
Start: 2021-10-19

## 2022-01-18 DIAGNOSIS — I12.9 BENIGN HYPERTENSION WITH CHRONIC KIDNEY DISEASE, STAGE III (H): ICD-10-CM

## 2022-01-18 DIAGNOSIS — N18.30 BENIGN HYPERTENSION WITH CHRONIC KIDNEY DISEASE, STAGE III (H): ICD-10-CM

## 2022-01-19 NOTE — TELEPHONE ENCOUNTER
Routing refill request to provider for review/approval because:  Patient needs to be seen because it has been more than 1 year since last office visit.    Mary Constantino RN  [facility]

## 2022-01-28 RX ORDER — LISINOPRIL 40 MG/1
TABLET ORAL
Qty: 30 TABLET | Refills: 0 | Status: SHIPPED | OUTPATIENT
Start: 2022-01-28

## 2022-01-29 ENCOUNTER — TELEPHONE (OUTPATIENT)
Dept: FAMILY MEDICINE | Facility: CLINIC | Age: 87
End: 2022-01-29
Payer: COMMERCIAL

## 2022-01-29 NOTE — TELEPHONE ENCOUNTER
Reason for Call:  Other other    Detailed comments: patient received a letter to schedule a med check with Justyna Fleming at [facility].  Patient is no longer with [facility] and will not be needing this appointment.  Thank you.    Phone Number Patient can be reached at: Other phone number:  na*    Best Time: na    Can we leave a detailed message on this number? Not Applicable    Call taken on 1/29/2022 at 8:50 AM by Valerie Jain

## 2022-05-09 DIAGNOSIS — I12.9 BENIGN HYPERTENSION WITH CHRONIC KIDNEY DISEASE, STAGE III (H): ICD-10-CM

## 2022-05-09 DIAGNOSIS — N18.30 BENIGN HYPERTENSION WITH CHRONIC KIDNEY DISEASE, STAGE III (H): ICD-10-CM

## 2022-05-11 NOTE — TELEPHONE ENCOUNTER
Routing refill request to provider for review/approval because:  Patient needs to be seen because it has been more than 1 year since last office visit.    Mary Constantino RN  [facility]

## 2022-05-31 RX ORDER — LISINOPRIL 40 MG/1
TABLET ORAL
Qty: 30 TABLET | Refills: 0 | OUTPATIENT
Start: 2022-05-31

## 2022-05-31 NOTE — TELEPHONE ENCOUNTER
Called and left detailed message on pharmacy provider line, patient has not had visit since Nov 2020 so either visit is needed in order to get refills or if pt is following new clinic or PCP, send request to new provider office.    Alethea Cartagena RN  [facility]

## 2022-07-12 ENCOUNTER — APPOINTMENT (OUTPATIENT)
Dept: URBAN - METROPOLITAN AREA CLINIC 254 | Age: 87
Setting detail: DERMATOLOGY
End: 2022-07-13

## 2022-07-12 VITALS — HEIGHT: 70 IN | WEIGHT: 200 LBS

## 2022-07-12 DIAGNOSIS — L57.0 ACTINIC KERATOSIS: ICD-10-CM

## 2022-07-12 DIAGNOSIS — L72.8 OTHER FOLLICULAR CYSTS OF THE SKIN AND SUBCUTANEOUS TISSUE: ICD-10-CM

## 2022-07-12 DIAGNOSIS — L82.1 OTHER SEBORRHEIC KERATOSIS: ICD-10-CM

## 2022-07-12 DIAGNOSIS — Z71.89 OTHER SPECIFIED COUNSELING: ICD-10-CM

## 2022-07-12 DIAGNOSIS — Z85.828 PERSONAL HISTORY OF OTHER MALIGNANT NEOPLASM OF SKIN: ICD-10-CM

## 2022-07-12 DIAGNOSIS — D485 NEOPLASM OF UNCERTAIN BEHAVIOR OF SKIN: ICD-10-CM

## 2022-07-12 DIAGNOSIS — L21.8 OTHER SEBORRHEIC DERMATITIS: ICD-10-CM

## 2022-07-12 PROBLEM — D48.5 NEOPLASM OF UNCERTAIN BEHAVIOR OF SKIN: Status: ACTIVE | Noted: 2022-07-12

## 2022-07-12 PROCEDURE — 11102 TANGNTL BX SKIN SINGLE LES: CPT

## 2022-07-12 PROCEDURE — 17000 DESTRUCT PREMALG LESION: CPT | Mod: 59

## 2022-07-12 PROCEDURE — OTHER BIOPSY BY SHAVE METHOD: OTHER

## 2022-07-12 PROCEDURE — OTHER SUNSCREEN RECOMMENDATIONS: OTHER

## 2022-07-12 PROCEDURE — OTHER COUNSELING: OTHER

## 2022-07-12 PROCEDURE — OTHER PRESCRIPTION: OTHER

## 2022-07-12 PROCEDURE — 99204 OFFICE O/P NEW MOD 45 MIN: CPT | Mod: 25

## 2022-07-12 PROCEDURE — OTHER LIQUID NITROGEN: OTHER

## 2022-07-12 RX ORDER — FLUOCINONIDE 0.5 MG/ML
0.05% SOLUTION TOPICAL QD
Qty: 60 | Refills: 2 | Status: ERX | COMMUNITY
Start: 2022-07-12

## 2022-07-12 ASSESSMENT — LOCATION DETAILED DESCRIPTION DERM
LOCATION DETAILED: RIGHT MEDIAL UPPER BACK
LOCATION DETAILED: LEFT INFERIOR OCCIPITAL SCALP
LOCATION DETAILED: LEFT SUPERIOR FOREHEAD
LOCATION DETAILED: LEFT FOREHEAD
LOCATION DETAILED: NASAL SUPRATIP
LOCATION DETAILED: LEFT MEDIAL MALAR CHEEK
LOCATION DETAILED: LEFT UPPER CUTANEOUS LIP
LOCATION DETAILED: RIGHT NASAL ALA
LOCATION DETAILED: EPIGASTRIC SKIN
LOCATION DETAILED: LEFT MEDIAL FRONTAL SCALP
LOCATION DETAILED: LEFT SUPERIOR CENTRAL MALAR CHEEK

## 2022-07-12 ASSESSMENT — LOCATION SIMPLE DESCRIPTION DERM
LOCATION SIMPLE: ABDOMEN
LOCATION SIMPLE: LEFT LIP
LOCATION SIMPLE: NOSE
LOCATION SIMPLE: LEFT CHEEK
LOCATION SIMPLE: RIGHT NOSE
LOCATION SIMPLE: LEFT SCALP
LOCATION SIMPLE: LEFT FOREHEAD
LOCATION SIMPLE: POSTERIOR SCALP
LOCATION SIMPLE: RIGHT UPPER BACK

## 2022-07-12 ASSESSMENT — LOCATION ZONE DERM
LOCATION ZONE: NOSE
LOCATION ZONE: LIP
LOCATION ZONE: FACE
LOCATION ZONE: TRUNK
LOCATION ZONE: SCALP

## 2022-07-12 NOTE — PROCEDURE: SUNSCREEN RECOMMENDATIONS
Detail Level: Detailed
Products Recommended: Eucerin zinc sensitive skin - samples and coupons given
General Sunscreen Counseling: I recommended a broad spectrum sunscreen with a SPF of 30 or higher.  I explained that SPF 30 sunscreens block approximately 97 percent of the sun's harmful rays.  Sunscreens should be applied at least 15 minutes prior to expected sun exposure and then every 2 hours after that as long as sun exposure continues. If swimming or exercising sunscreen should be reapplied every 45 minutes to an hour after getting wet or sweating.  One ounce, or the equivalent of a shot glass full of sunscreen, is adequate to protect the skin not covered by a bathing suit. I also recommended a lip balm with a sunscreen as well. Sun protective clothing can be used in lieu of sunscreen but must be worn the entire time you are exposed to the sun's rays.

## 2022-07-12 NOTE — PROCEDURE: BIOPSY BY SHAVE METHOD
Hide Anesthesia Volume?: No
Cryotherapy Text: The wound bed was treated with cryotherapy after the biopsy was performed.
Additional Anesthesia Volume In Cc (Will Not Render If 0): 0
Wound Care: Petrolatum
Electrodesiccation And Curettage Text: The wound bed was treated with electrodesiccation and curettage after the biopsy was performed.
Consent: Written consent was obtained and risks were reviewed including but not limited to scarring, infection, bleeding, scabbing, incomplete removal, nerve damage and allergy to anesthesia.
Type Of Destruction Used: Curettage
Silver Nitrate Text: The wound bed was treated with silver nitrate after the biopsy was performed.
Notification Instructions: Patient will be notified of biopsy results. However, patient instructed to call the office if not contacted within 2 weeks.
Depth Of Biopsy: dermis
Information: Selecting Yes will display possible errors in your note based on the variables you have selected. This validation is only offered as a suggestion for you. PLEASE NOTE THAT THE VALIDATION TEXT WILL BE REMOVED WHEN YOU FINALIZE YOUR NOTE. IF YOU WANT TO FAX A PRELIMINARY NOTE YOU WILL NEED TO TOGGLE THIS TO 'NO' IF YOU DO NOT WANT IT IN YOUR FAXED NOTE.
Biopsy Method: Dermablade
Billing Type: Third-Party Bill
Render Post-Care Instructions In Note?: yes
Biopsy Type: H and E
Hemostasis: Drysol
Post-Care Instructions: I reviewed with the patient in detail post-care instructions. Patient is to keep the biopsy site dry overnight, and then apply bacitracin twice daily until healed. Patient may apply hydrogen peroxide soaks to remove any crusting.
Anesthesia Volume In Cc (Will Not Render If 0): 0.5
Detail Level: Detailed
Electrodesiccation Text: The wound bed was treated with electrodesiccation after the biopsy was performed.
Anesthesia Type: 1% lidocaine with epinephrine
Curettage Text: The wound bed was treated with curettage after the biopsy was performed.
Dressing: bandage

## 2022-07-28 ENCOUNTER — RX ONLY (RX ONLY)
Age: 87
End: 2022-07-28

## 2022-07-28 RX ORDER — IMIQUIMOD 12.5 MG/.25G
CREAM TOPICAL
Qty: 1 | Refills: 0 | Status: ERX | COMMUNITY
Start: 2022-07-28

## 2022-08-09 ENCOUNTER — APPOINTMENT (OUTPATIENT)
Dept: URBAN - METROPOLITAN AREA CLINIC 254 | Age: 87
Setting detail: DERMATOLOGY
End: 2022-08-10

## 2022-08-09 PROBLEM — D03.39 MELANOMA IN SITU OF OTHER PARTS OF FACE: Status: ACTIVE | Noted: 2022-08-09

## 2022-08-09 PROCEDURE — OTHER POST-OP WOUND CHECK: OTHER

## 2022-08-09 PROCEDURE — OTHER COUNSELING: OTHER

## 2022-08-09 NOTE — PROCEDURE: POST-OP WOUND CHECK
Wound Assessment Override (Optional): red, irritated. no signs of infection. using imiqumod daily.
Wound Evaluated By: Scott plata
Add 29510 Cpt? (Important Note: In 2017 The Use Of 76444 Is Being Tracked By Cms To Determine Future Global Period Reimbursement For Global Periods): no
Detail Level: Detailed

## 2022-08-16 ENCOUNTER — APPOINTMENT (OUTPATIENT)
Dept: URBAN - METROPOLITAN AREA CLINIC 254 | Age: 87
Setting detail: DERMATOLOGY
End: 2022-08-17

## 2022-08-16 PROBLEM — D03.9 MELANOMA IN SITU, UNSPECIFIED: Status: ACTIVE | Noted: 2022-08-16

## 2022-08-16 PROCEDURE — OTHER MIPS QUALITY: OTHER

## 2022-08-16 PROCEDURE — OTHER PRESCRIPTION MEDICATION MANAGEMENT: OTHER

## 2022-08-16 PROCEDURE — OTHER COUNSELING: OTHER

## 2022-08-16 PROCEDURE — 99213 OFFICE O/P EST LOW 20 MIN: CPT

## 2022-08-16 NOTE — PROCEDURE: PRESCRIPTION MEDICATION MANAGEMENT
Render In Strict Bullet Format?: No
Discontinue Regimen: Imiquimod 5% cream
Detail Level: Zone
Plan: Patient used this for 2 weeks and had a positive reaction

## 2022-10-04 ENCOUNTER — APPOINTMENT (OUTPATIENT)
Dept: URBAN - METROPOLITAN AREA CLINIC 254 | Age: 87
Setting detail: DERMATOLOGY
End: 2022-10-04

## 2022-10-04 DIAGNOSIS — L81.4 OTHER MELANIN HYPERPIGMENTATION: ICD-10-CM

## 2022-10-04 DIAGNOSIS — L72.0 EPIDERMAL CYST: ICD-10-CM

## 2022-10-04 PROBLEM — D03.9 MELANOMA IN SITU, UNSPECIFIED: Status: ACTIVE | Noted: 2022-10-04

## 2022-10-04 PROCEDURE — OTHER MIPS QUALITY: OTHER

## 2022-10-04 PROCEDURE — OTHER COUNSELING: OTHER

## 2022-10-04 PROCEDURE — 99213 OFFICE O/P EST LOW 20 MIN: CPT

## 2022-10-04 PROCEDURE — OTHER ADDITIONAL NOTES: OTHER

## 2022-10-04 ASSESSMENT — LOCATION DETAILED DESCRIPTION DERM
LOCATION DETAILED: LEFT CENTRAL MALAR CHEEK
LOCATION DETAILED: LEFT SUPERIOR FOREHEAD

## 2022-10-04 ASSESSMENT — LOCATION ZONE DERM: LOCATION ZONE: FACE

## 2022-10-04 ASSESSMENT — LOCATION SIMPLE DESCRIPTION DERM
LOCATION SIMPLE: LEFT FOREHEAD
LOCATION SIMPLE: LEFT CHEEK

## 2022-10-04 NOTE — PROCEDURE: ADDITIONAL NOTES
Additional Notes: Advised patient that we will plan to follow up in 6 months.
Detail Level: Simple
Render Risk Assessment In Note?: no

## 2023-04-06 ENCOUNTER — APPOINTMENT (OUTPATIENT)
Dept: URBAN - METROPOLITAN AREA CLINIC 254 | Age: 88
Setting detail: DERMATOLOGY
End: 2023-04-07

## 2023-04-06 DIAGNOSIS — L21.8 OTHER SEBORRHEIC DERMATITIS: ICD-10-CM

## 2023-04-06 PROBLEM — D03.39 MELANOMA IN SITU OF OTHER PARTS OF FACE: Status: ACTIVE | Noted: 2023-04-06

## 2023-04-06 PROCEDURE — OTHER COUNSELING: OTHER

## 2023-04-06 PROCEDURE — OTHER MIPS QUALITY: OTHER

## 2023-04-06 PROCEDURE — 99213 OFFICE O/P EST LOW 20 MIN: CPT

## 2023-04-06 PROCEDURE — OTHER ADDITIONAL NOTES: OTHER

## 2023-04-06 ASSESSMENT — LOCATION ZONE DERM: LOCATION ZONE: FACE

## 2023-04-06 ASSESSMENT — LOCATION SIMPLE DESCRIPTION DERM: LOCATION SIMPLE: SUPERIOR FOREHEAD

## 2023-04-06 ASSESSMENT — LOCATION DETAILED DESCRIPTION DERM: LOCATION DETAILED: SUPERIOR MID FOREHEAD

## 2023-04-06 NOTE — PROCEDURE: ADDITIONAL NOTES
Additional Notes: - Patient to utilize Fluocinonide solution bid until resolve but no longer than 14 days per month.
Detail Level: Simple
Render Risk Assessment In Note?: no

## 2023-04-23 NOTE — TELEPHONE ENCOUNTER
..Reason for Call:  Questions regarding 08-20--18 visit    Detailed comments: other than drinking water, what else should Patient be doing for constipation; and nausea; feels weak;     When this all started it was diarrhea;     Wondering if gastoenterolgy is an option; and requesting  x-ray results;     Phone Number Patient can be reached at: Home number on file [phone] (home)    Best Time: anytime    Can we leave a detailed message on this number? NO, sometimes it doesn't work    Call taken on 8/21/2018 at 3:25 PM by Marion Crespo      
Addressed in result note dated today.  Collette ROB, CNP      
Notes Recorded by Collette Walker APRN CNP on 8/22/2018 at 7:08 AM  Please call patient with the message below and still send the letter in the mail as well.  Collette ROB CNP      Hi James,    Your white blood count is a little elevated and your hemoglobin continues to be slightly decreased, but it is stable.  We'll watch this for now.      Your urine does not show a urinary tract infection.  Your serum sodium is a little low and I want you to be drinking more fluids. I think you're a little dehydrated.  Please schedule a lab only appt for Friday to recheck your metabolic panel.    I have also written for some Miralax and want you to be taking this daily.  Your x ray showed a large amount of stool throughout your colon and I think this contributes to your nausea.  If you continue to have constipation after a few days, you can increase your Miralax to twice a day dosing.    Feel free to contact me with any questions or concerns.  Thank you for allowing me to participate in your care.    Collette ROB CNP    Gave James's wife and patient results and discussed increasing fluid intake. They were scheduled for lab appointment Friday and have no further questions or concerns at this time.     Yulisa Garza RN, BSN    
Routing to provider to advise for X-ray results and any further recommendations. Patient was seen 8/20/18.    Yulisa Garza RN, BSN    
(M6) obeys commands
